# Patient Record
Sex: FEMALE | Race: WHITE | Employment: UNEMPLOYED | URBAN - METROPOLITAN AREA
[De-identification: names, ages, dates, MRNs, and addresses within clinical notes are randomized per-mention and may not be internally consistent; named-entity substitution may affect disease eponyms.]

---

## 2017-01-01 ENCOUNTER — PATIENT MESSAGE (OUTPATIENT)
Dept: INTERNAL MEDICINE CLINIC | Age: 47
End: 2017-01-01

## 2017-01-03 RX ORDER — BUTALBITAL, ACETAMINOPHEN AND CAFFEINE 50; 325; 40 MG/1; MG/1; MG/1
1 TABLET ORAL
Qty: 40 TAB | Refills: 3 | OUTPATIENT
Start: 2017-01-03 | End: 2018-01-16 | Stop reason: SDUPTHER

## 2017-01-10 ENCOUNTER — OFFICE VISIT (OUTPATIENT)
Dept: SURGERY | Age: 47
End: 2017-01-10

## 2017-01-10 VITALS
TEMPERATURE: 97.7 F | HEIGHT: 63 IN | SYSTOLIC BLOOD PRESSURE: 120 MMHG | BODY MASS INDEX: 32.51 KG/M2 | DIASTOLIC BLOOD PRESSURE: 78 MMHG | RESPIRATION RATE: 18 BRPM | OXYGEN SATURATION: 98 % | WEIGHT: 183.5 LBS | HEART RATE: 70 BPM

## 2017-01-10 DIAGNOSIS — K43.9 HERNIA OF ABDOMINAL WALL: ICD-10-CM

## 2017-01-10 DIAGNOSIS — E66.01 MORBID OBESITY DUE TO EXCESS CALORIES (HCC): ICD-10-CM

## 2017-01-10 DIAGNOSIS — K91.2 POSTOPERATIVE INTESTINAL MALABSORPTION: Primary | ICD-10-CM

## 2017-01-10 DIAGNOSIS — E66.9 OBESITY (BMI 30.0-34.9): ICD-10-CM

## 2017-01-10 NOTE — PROGRESS NOTES
Chief Complaint   Patient presents with    Surgical Follow-up     6 months s/p gastric bypass by Dr Yuriy Acosta. down 68.5 pounds,  lost 26.5 pounds. Patient is 6 months status post Malabsorptive gastric bypass for treatment of morbid obesity. Patient has lost 68.5 lbs. Since surgery. Patient is satisfied with progress. Patient is consuming about 70 grams of protein daily. She is drinking a premiere shake and eating a premiere protein bar daily. She does not do well with meats unless it is in a sauce. She does fine with vegetables. She is drinking 60+ oz fluids. she is using her bike for exercise. She has occasional nausea and takes the Zofran 1-2 x/ month. Bowels moving daily to every other day and she is using Miralax prn. Keep food journal +   Fitness tracker +   Counseling +   Craves sweets     Co-Morbid(s)     Resolved      Was anti coagulation initiated for presumed / confirmed DVT/PE? NO    Was an incisional hernia noted on exam?       YES (this was present prior to surgery)       COMORBIDITY     SLEEP APNEA                 YES        GERD  (req.meds)           NO  HYPERLIPIDEMIA           NO  HYPERTENSION             NO       IF YES, # OF HTN MEDICATIONS 0  DIABETES                        NO      IF YES, 0 NON-INSULIN   0 INSULIN     Visit Vitals    /78    Pulse 70    Temp 97.7 °F (36.5 °C)    Resp 18    Ht 5' 3\" (1.6 m)    Wt 183 lb 8 oz (83.2 kg)    LMP 01/29/2011    SpO2 98%    BMI 32.51 kg/m2     A + O x 3  Chest  CTA  COR  RRR  ABD Soft, palpable firmness to the right of the umbilicus with slight bulge in the area; non tender and not reducible; ND, +BS  EXT No edema; ambulating independently        ICD-10-CM ICD-9-CM    1. 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 Obesity (BMI 30.0-34. 9) E66.9 278.00    3.  Hernia of abdominal wall K43.9 553.20      6 months s/p Malabsorptive gastric bypass for treatment of morbid obesity   Doing well overall   Referred to RD for support   Labs reviewed   Wants hernia fixed, but \"needs to lose more weight\" so will reassess in 3 months   S/s hernia incarceration reviewed   Daily activity   Vitamins   Support group  Continue mental health support   Follow up in 3 months   Raad Lyn verbalized understanding and questions were answered to the best of my knowledge and ability. Behavior change  educational materials were provided. 15 minutes spent in face to face with Raad Lyn > 50% counseling.

## 2017-01-10 NOTE — MR AVS SNAPSHOT
Visit Information Date & Time Provider Department Dept. Phone Encounter #  
 1/10/2017  9:00 AM Soledad Singh NP [place] [phone] Your Appointments 5/2/2017  7:45 AM  
ACUTE CARE with Maninder Galvin MD  
[facility] [address] [place] [address]) Appt Note: 6 mnth fu thyroid [address] [place] [phone]  
[phone]  
  
   
 [address] [facility] [id number] Upcoming Health Maintenance Date Due  
 FOOT EXAM Q1 5/31/1979 INFLUENZA AGE 9 TO ADULT 8/1/2016 MICROALBUMIN Q1 10/13/2016 EYE EXAM RETINAL OR DILATED Q1 2/8/2017 HEMOGLOBIN A1C Q6M 5/2/2017 LIPID PANEL Q1 6/27/2017 DTaP/Tdap/Td series (2 - Td) 11/12/2023 Allergies as of 1/10/2017  Review Complete On: 10/4/2016 By: Soledad Singh NP Severity Noted Reaction Type Reactions Sulfa (Sulfonamide Antibiotics) High 03/04/2011   Side Effect Anaphylaxis, Other (comments) Difficulty breathing/high fever/delusional    
 Aleve [Naproxen Sodium]  09/30/2013    Nausea Only Codeine  03/04/2011   Side Effect Nausea Only Ibuprofen  03/04/2011   Side Effect Hives Lactose  02/23/2016   Intolerance Other (comments) Gas Penicillin G  03/04/2011   Side Effect Hives Current Immunizations  Reviewed on 7/11/2016 Name Date Influenza Vaccine 11/12/2013 Influenza Vaccine (Quad) PF 10/13/2015 Influenza Vaccine (Whole Virus) 3/1/2013 Influenza Vaccine PF 10/23/2014 Influenza Vaccine Split 11/2/2011 Pneumococcal Polysaccharide (PPSV-23) 3/11/2015 Tdap 11/12/2013 Not reviewed this visit Vitals BP Pulse Temp Resp Height(growth percentile) Weight(growth percentile) 120/78 70 97.7 °F (36.5 °C) 18 5' 3\" (1.6 m) 183 lb 8 oz (83.2 kg) LMP SpO2 BMI OB Status Smoking Status 01/29/2011 98% 32.51 kg/m2 Hysterectomy Former Smoker Vitals History BMI and BSA Data Body Mass Index Body Surface Area 32.51 kg/m 2 1.92 m 2 Preferred Pharmacy Pharmacy Name Phone [id number]- [facility] [phone] Your Updated Medication List  
  
   
This list is accurate as of: 1/10/17  9:33 AM.  Always use your most recent med list.  
  
  
  
  
 ascorbic acid (vitamin C) 250 mg tablet Commonly known as:  VITAMIN C Take 250 mg by mouth two (2) times a day. AYR SALINE topical gel Generic drug:  sodium chloride-aloe vera  
by Nasal route daily as needed (dry nares). Benefiber Clear 3 gram/3.5 gram Powd Generic drug:  wheat dextrin Take  by mouth daily. 2 Teaspoons daily AT 7AM AND 5PM  
  
 buPROPion  mg SR tablet Commonly known as:  WELLBUTRIN SR  
TAKE 1 TABLET BY MOUTH EVERY DAY  
  
 busPIRone 5 mg tablet Commonly known as:  BUSPAR Take 5 mg by mouth three (3) times daily as needed. butalbital-acetaminophen-caffeine -40 mg per tablet Commonly known as:  Kiki Reasoner Take 1 Tab by mouth every six (6) hours as needed for Pain. Max Daily Amount: 4 Tabs. calcium citrate-vitamin d3 315-200 mg-unit Tab Commonly known as:  CITRACAL+D Take 2 Tabs by mouth two (2) times daily (with meals). Indications: Pt states, \"medication has been change to take at noon and dinner. \"  
  
 cetirizine 10 mg tablet Commonly known as:  ZYRTEC Take  by mouth nightly. CULTURELLE PO Take  by mouth. cyclobenzaprine 10 mg tablet Commonly known as:  FLEXERIL  
TAKE 1 TABLET BY MOUTH 3 TIMES A DAY AS NEEDED MUSCLE SPASM  
  
 escitalopram oxalate 20 mg tablet Commonly known as:  Seda Martinez Take 1 Tab by mouth daily. FLONASE 50 mcg/actuation nasal spray Generic drug:  fluticasone 2 Sprays by Both Nostrils route nightly as needed for Rhinitis (as needed). * gabapentin 300 mg capsule Commonly known as:  NEURONTIN  
 TAKE ONE CAPSULE BY MOUTH IN THE MORNING, 1 CAPSULE AT NOON, AND 2 CAPSULES AT BEDTIME  
  
 * gabapentin 300 mg capsule Commonly known as:  NEURONTIN  
TAKE 1 CAPSULES BY MOUTH IN THE MORNING,, 1 AT NOON, 2 CAPS AT BEDTIME  
  
 hyoscyamine SL 0.125 mg SL tablet Commonly known as:  LEVSIN/SL  
1 Tab by SubLINGual route every four (4) hours as needed for Cramping.  
  
 ketoconazole 2 % topical cream  
Commonly known as:  NIZORAL Apply  to affected area daily. Lancets Misc Commonly known as: One Touch Georgann Mcardle 1 Package by Does Not Apply route daily. lansoprazole 30 mg capsule Commonly known as:  PREVACID Take 1 Cap by mouth daily. magnesium 250 mg Tab Take 250 mg by mouth.  
  
 magnesium hydroxide 400 mg/5 mL suspension Commonly known as:  MILK OF MAGNESIA Take 30 mL by mouth daily as needed for Constipation. ondansetron 4 mg disintegrating tablet Commonly known as:  ZOFRAN ODT Take 1 Tab by mouth every eight (8) hours as needed for Nausea. ONETOUCH ULTRA TEST strip Generic drug:  glucose blood VI test strips TEST ONCE A DAY. 100 STRIPS  
  
 OTHER  
C-PAP Machine SYNTHROID 175 mcg tablet Generic drug:  levothyroxine Take 1 Tab by mouth Daily (before breakfast). SYSTANE (PROPYLENE GLYCOL) 0.4-0.3 % Drop Generic drug:  peg 400-propylene glycol Administer 1 Drop to both eyes as needed. therapeutic multivitamin tablet Commonly known as:  [facility] Take 1 Tab by mouth two (2) times a day. VITAMIN D3 5,000 unit Tab tablet Generic drug:  cholecalciferol (VITAMIN D3) Take 1,000 Units by mouth two (2) times a day. LIQUID SOFT GEL * Notice: This list has 2 medication(s) that are the same as other medications prescribed for you. Read the directions carefully, and ask your doctor or other care provider to review them with you. Patient Instructions Call and make an appointment with the dietician, please call or email Shawn Shields, [address] at: 
 
[phone] [email or web address] 
 
Try Quinoa instead of mashed potatoes (great protein and fiber) Stay on your vitamins Decrease the Prevacid to every other day for the next week, then stop If you have increased stomach upset, nausea or food intolerance let me know and we can try resuming at a lower dose Introducing [facility] [place]! Dear Maikel Listen: 
Thank you for requesting a [employer] account. Our records indicate that you already have an active DNs[employer] account. You can access your account anytime at [email or web address] Did you know that you can access your hospital and ER discharge instructions at any time in [employer]Ns[employer]? You can also review all of your test results from your hospital stay or ER visit. Additional Information If you have questions, please visit the Frequently Asked Questions section of the [employer]Ns[employer] website at [email or web address] Remember, DNsolution is NOT to be used for urgent needs. For medical emergencies, dial 911. Now available from your iPhone and Android! Please provide this summary of care documentation to your next provider. Your primary care clinician is listed as Carly Mata. If you have any questions after today's visit, please call [phone].

## 2017-01-10 NOTE — PATIENT INSTRUCTIONS
Call and make an appointment with the dietician, please call or email Filippo Alatorre RD at:    [phone]  [email or web address]    Try [place]ne [place] instead of mashed potatoes (great protein and fiber)    Stay on your vitamins     Decrease the Prevacid to every other day for the next week, then stop     If you have increased stomach upset, nausea or food intolerance let me know and we can try resuming at a lower dose

## 2017-01-10 NOTE — PROGRESS NOTES
1. Have you been to the ER, urgent care clinic since your last visit? Hospitalized since your last visit?  no    2. Have you seen or consulted any other health care providers outside of the [facility] [place] since your last visit? Include any pap smears or colon screening.    no

## 2017-01-18 ENCOUNTER — CLINICAL SUPPORT (OUTPATIENT)
Dept: SURGERY | Age: 47
End: 2017-01-18

## 2017-01-18 VITALS — WEIGHT: 181 LBS | HEIGHT: 63 IN | BODY MASS INDEX: 32.07 KG/M2

## 2017-01-18 DIAGNOSIS — Z71.3 NUTRITIONAL COUNSELING: Primary | ICD-10-CM

## 2017-01-18 NOTE — PROGRESS NOTES
Post-Operative Bariatric Nutrition Counseling     Physician/Surgeon/Provider: Roge Keane M.D. / Lydia Persaud N.P.   Name: Brittani Vasquez  : 1970        Reason for visit: Follow up nutrition education and counseling post gastric bypass      ASSESSMENT:  Date of surgery:6 months post-op   Medications/Supplements:   Prior to Admission medications    Medication Sig Start Date End Date Taking? Authorizing Provider   butalbital-acetaminophen-caffeine (FIORICET, ESGIC) -40 mg per tablet Take 1 Tab by mouth every six (6) hours as needed for Pain. Max Daily Amount: 4 Tabs. 1/3/17   Celsa Royal MD   cyclobenzaprine (FLEXERIL) 10 mg tablet TAKE 1 TABLET BY MOUTH 3 TIMES A DAY AS NEEDED MUSCLE SPASM 12/15/16   Celsa Royal MD   SYNTHROID 175 mcg tablet Take 1 Tab by mouth Daily (before breakfast). 11/3/16   Celsa Royal MD   escitalopram oxalate (LEXAPRO) 20 mg tablet Take 1 Tab by mouth daily. 16   Celsa Royal MD   ondansetron (ZOFRAN ODT) 4 mg disintegrating tablet Take 1 Tab by mouth every eight (8) hours as needed for Nausea. 16   Marizol Andres NP   buPROPion SR (WELLBUTRIN SR) 100 mg SR tablet TAKE 1 TABLET BY MOUTH EVERY DAY 10/10/16   Celsa Royal MD   lansoprazole (PREVACID) 30 mg capsule Take 1 Cap by mouth daily. 16   Celsa Royal MD   gabapentin (NEURONTIN) 300 mg capsule TAKE ONE CAPSULE BY MOUTH IN THE MORNING, 1 CAPSULE AT NOON, AND 2 CAPSULES AT BEDTIME 16   Celsa Royal MD   gabapentin (NEURONTIN) 300 mg capsule TAKE 1 CAPSULES BY MOUTH IN THE MORNING,, 1 AT NOON, 2 CAPS AT BEDTIME 16   Celsa Royal MD   hyoscyamine SL (LEVSIN/SL) 0.125 mg SL tablet 1 Tab by SubLINGual route every four (4) hours as needed for Cramping. 16   Blayne Rivera NP   magnesium hydroxide (MILK OF MAGNESIA) 400 mg/5 mL suspension Take 30 mL by mouth daily as needed for Constipation.     Historical Provider   busPIRone (BUSPAR) 5 mg tablet Take 5 mg by mouth three (3) times daily as needed. 4/28/16   Historical Provider   ascorbic acid, vitamin C, (VITAMIN C) 250 mg tablet Take 250 mg by mouth two (2) times a day. Historical Provider   LACTOBACILLUS RHAMNOSUS GG (CULTURELLE PO) Take  by mouth. Historical Provider   therapeutic multivitamin (THERAGRAN) tablet Take 1 Tab by mouth two (2) times a day. Historical Provider   magnesium 250 mg tab Take 250 mg by mouth. Historical Provider   calcium citrate-vitamin d3 (CITRACAL+D) 315-200 mg-unit tab Take 2 Tabs by mouth two (2) times daily (with meals). Indications: Pt states, \"medication has been change to take at noon and dinner. \"    Historical Provider   cetirizine (ZYRTEC) 10 mg tablet Take  by mouth nightly. Historical Provider   wheat dextrin (BENEFIBER CLEAR) 3 gram/3.5 gram powd Take  by mouth daily. 2 Teaspoons daily AT 7AM AND 5PM    Historical Provider   cholecalciferol, VITAMIN D3, (VITAMIN D3) 5,000 unit tab tablet Take 1,000 Units by mouth two (2) times a day. LIQUID SOFT GEL 10/27/15   Hudson Lopes MD   ketoconazole (NIZORAL) 2 % topical cream Apply  to affected area daily. Patient taking differently: Apply  to affected area as needed. 10/13/15   Hudson Lopes MD   fluticasone (FLONASE) 50 mcg/actuation nasal spray 2 Sprays by Both Nostrils route nightly as needed for Rhinitis (as needed). Historical Provider   peg 400-propylene glycol (SYSTANE) 0.4-0.3 % drop Administer 1 Drop to both eyes as needed. Historical Provider   sodium chloride-aloe vera (AYR SALINE) topical gel by Nasal route daily as needed (dry nares).     Historical Provider   OTHER C-PAP Machine    Historical Provider       Pt takes recommended supplements as prescribed:   Multivitamin: yes    Calcium Citrate:yes     Vitamin D3: yes    Vitamin B12: yes      Food Allergies/Intolerances: intolerant to dry meat, no food allergies     Anthropometrics:     Ht:63\"      Wt :181#   Pre-op wt: 268#   Net Wt Loss: 87# (32%)   IBW: 115#  %IBW: 157%   BMI: 32 Category: obesity II     Reported wt history: Pt presents today seeking nutrition counseling to promote continued wt loss post gastric bypass. Pt had surgery July 11, 2016 and reports a pre-operative wt of 268#. Today's reported wt indicates a 87# wt loss. Pt is satisfied with progress and many health conditions have since resolved as a result of wt loss (diabetes, HTN, high cholesterol, etc). Pt has been told that she needs to lose another 31# to qualify for hernia repair surgery with Dr. Nakul Sheehan. She is concerned because her wt loss has recently \"slowed\". States her lowest adult BW prior to surgery was 180# and she is fearful that she will never be able to get below this wt. Her personal wt loss goal is to get her BMI down to \"normal\" and wt of 135#. Her barriers to wt loss are mostly related to lack of exercise. She has a busy schedule which causes her to rely fairly heavily on protein shakes and bars as her meals for 1-2 meals per day. Limited food sources of protein d/t lack of time and intolerance to a lot of meats. Uses crock-pot and sauces to help improve tolerance of meats. Overall appears pt could benefit from more routine, intentional and more challenging exercise as well as more whole food sources of protein for more mental and physical satiety and to help promote weight loss. Exercise/Physical Actvity: riding stationary bike for 5 minutes, 3-4 times per day     Reported Diet History: A post op nutrition checklist and 24 hour diet recall were obtained from patient;       Consumes 3 meals per day Yes    Includes a lean source of protein with each meal Yes    Measures all meals to 1/2-1 cup Yes    Limits dining out and orders with special request Yes    Consumes meals over 20-30 minutes Yes    Monitors hunger and fullness cues Yes    Follows the 30-30-30 rule Yes    Sips 48-64 oz of sugar free, calorie free, non-carbonated beverages per day Yes    Utilizes food journal for self-monitoring Yes    Attends Bariatric Support Group Yes        24 Hour Diet Recall  Breakfast  Greek yogurt or protein bar/shake    Lunch  protein bar/shake or 1/8 cup chicken salad and 5 baby carrots   Dinner  1/2 cup [employer]  [place] [facility] [place]    Snacks  1/2 small apple and peanut butter or protein bar   Beverages  Herbal tea, water        NUTRITION DIAGNOSIS:  1. Physical inactivity r/t perception that busy schedule prevents exercise evidenced by limited frequency, duration and/or intensity of exercise. 2. Food and nutrition related knowledge deficit r/t lack of exposure to information evidenced by pt seeking nutrition counseling to promote continued wt loss. Tracking intake is averaging about 800 calories per day. Protein intake is consistent at 60 grams per day but comes mostly from shakes and protein bars. NUTRITION INTERVENTION:  Nutrition counseling, motivational interviewing and nutrition education to help promote continued wt loss. Encouraged pt to develop a more routine and intentional exercise regimen that is challenging to help promote weight loss. Pt has gotten this far with her wt loss primarily from calorie restriction and she now needs to incorporate more challenging exercise to help her get past her current wt. Provided information for various exercise programs and encouraged her to utilize her current gym membership. Nutrition education for increasing food sources of protein using moist methods of cooking. Provided various recipes to help with better tolerance of meats/proteins. Discussed muffin tin recipes for things she make ahead for the week for easy quick and go options d/t busy schedule. Discussed the importance of trying to get protein from food and less reliance on shakes and bars. Use shakes and bars PRN. NUTRITION MONITORING AND EVALUATION:    The following goals were established with patient:  1. Increase exercise.  It needs to be challenging and routine/consistent/intentional. Resources provided. 2. Increase food sources of protein. Recipes and cooking methods discussed and provided additional ideas. 3. Use protein shakes/bars PRN. Maintain minimum of 60 grams protein daily. 4. Follow up with RD PRN. Specific tips and techniques to facilitate compliance with above recommendations were provided and discussed. Pt was strongly encouraged to begin making necessary changes now, attend support group, and re-visit the dietitian prn. If further details are desired please feel free to contact me at [phone]. This phone number was also provided to the patient for any further questions or concerns.              Ministerio Worrell RD

## 2017-03-04 DIAGNOSIS — Z98.84 HISTORY OF GASTRIC BYPASS: ICD-10-CM

## 2017-03-05 RX ORDER — LANSOPRAZOLE 30 MG/1
CAPSULE, DELAYED RELEASE ORAL
Qty: 90 CAP | Refills: 1 | Status: SHIPPED | OUTPATIENT
Start: 2017-03-05 | End: 2017-05-04 | Stop reason: ALTCHOICE

## 2017-04-11 ENCOUNTER — OFFICE VISIT (OUTPATIENT)
Dept: SURGERY | Age: 47
End: 2017-04-11

## 2017-04-11 VITALS
HEIGHT: 63 IN | OXYGEN SATURATION: 98 % | RESPIRATION RATE: 18 BRPM | HEART RATE: 73 BPM | DIASTOLIC BLOOD PRESSURE: 80 MMHG | TEMPERATURE: 97.5 F | SYSTOLIC BLOOD PRESSURE: 130 MMHG | WEIGHT: 168.5 LBS | BODY MASS INDEX: 29.86 KG/M2

## 2017-04-11 DIAGNOSIS — R11.0 NAUSEA: ICD-10-CM

## 2017-04-11 DIAGNOSIS — K43.2 VENTRAL INCISIONAL HERNIA WITHOUT OBSTRUCTION OR GANGRENE: ICD-10-CM

## 2017-04-11 DIAGNOSIS — Z98.84 HX OF GASTRIC BYPASS: ICD-10-CM

## 2017-04-11 DIAGNOSIS — R10.11 COLICKY RUQ ABDOMINAL PAIN: Primary | ICD-10-CM

## 2017-04-11 RX ORDER — POLYETHYLENE GLYCOL 3350 17 G/17G
17 POWDER, FOR SOLUTION ORAL DAILY
COMMUNITY

## 2017-04-11 NOTE — PROGRESS NOTES
Office Visit    Subjective:     Cailin Huff is a 51 y.o. female 9 months post laparoscopic gastric bypass surgery on 7/11/16, she has lost 100 lbs since then. She complains of right upper quadrant pain, that is typically associated with fatty foods. Pain onset's with food's such as a protein bar, and apple with peanut butter. She has had nausea but no vomiting after eating tilapia, seasoned with olive oil, last night that was so bad she could not finish her dinner. The patient has has had lighter colored stool but not had jaundice, acholic stools or dark urine and has not had a history of pancreatitis or hepatitis. On an unrelated note, patient also comes in with complaints of epigastric hernia, no very symptomatic at this time.  She would like to hold off on that until [address] or August.     Patient Active Problem List    Diagnosis Date Noted    History of weight loss surgery 07/28/2016    Hypothyroidism due to acquired atrophy of thyroid 06/02/2016    Fibromyalgia 11/11/2013    Anxiety 03/04/2011    Depression 03/04/2011     Past Medical History:   Diagnosis Date    Arthritis     BMI 45.0-49.9, adult ([address]     Chronic pain     fibromyalgia    Depression     and anxiety    Diabetes ([address]     Elevated lipids     Fibromyalgia     GERD (gastroesophageal reflux disease) 2013    H/O seasonal allergies     Headache     Hypercholesterolemia     Hypertension     Migraines     Morbid obesity ([address]     LINO on CPAP     Thyroid disease       Past Surgical History:   Procedure Laterality Date    HX GYN      ECTOPIC PREGNANCE LEFT TUBE REMOVED    HX HYSTERECTOMY  2011    HX LAP GASTRIC BYPASS  7/11/16    by Dr Lia Larsen    right ovarian tumor    HX TUBAL LIGATION        Social History   Substance Use Topics    Smoking status: Former Smoker     Packs/day: 1.00     Years: 20.00     Types: Cigarettes     Quit date: 3/4/2005    Smokeless tobacco: Never Used    Alcohol use 0.0 oz/week     0 Standard drinks or equivalent per week      Comment: holidays - wine/rare      Family History   Problem Relation Age of Onset    Diabetes Mother     Hypertension Mother     Crohn's Disease Mother     Asthma Mother     Lung Disease Mother     Migraines Mother     Cancer Mother      SKIN    Depression Mother     Other Mother      FIBROMYALGIA    Gout Mother     Cancer Father      oral    Diabetes Father      steroid induced    Alcohol abuse Sister     Heart Disease Sister     Migraines Sister     Depression Sister      BIPOLAR DISORDER    Anesth Problems Daughter      N&V    Asthma Daughter     Cancer Paternal Grandmother      colon      Current Outpatient Prescriptions   Medication Sig    polyethylene glycol (MIRALAX) 17 gram packet Take 17 g by mouth daily.  buPROPion SR (WELLBUTRIN SR) 100 mg SR tablet TAKE 1 TABLET BY MOUTH EVERY DAY    lansoprazole (PREVACID) 30 mg capsule TAKE 1 CAP BY MOUTH DAILY.  butalbital-acetaminophen-caffeine (FIORICET, ESGIC) -40 mg per tablet Take 1 Tab by mouth every six (6) hours as needed for Pain. Max Daily Amount: 4 Tabs.  cyclobenzaprine (FLEXERIL) 10 mg tablet TAKE 1 TABLET BY MOUTH 3 TIMES A DAY AS NEEDED MUSCLE SPASM    SYNTHROID 175 mcg tablet Take 1 Tab by mouth Daily (before breakfast).  escitalopram oxalate (LEXAPRO) 20 mg tablet Take 1 Tab by mouth daily.  ondansetron (ZOFRAN ODT) 4 mg disintegrating tablet Take 1 Tab by mouth every eight (8) hours as needed for Nausea.  gabapentin (NEURONTIN) 300 mg capsule TAKE 1 CAPSULES BY MOUTH IN THE MORNING,, 1 AT NOON, 2 CAPS AT BEDTIME    hyoscyamine SL (LEVSIN/SL) 0.125 mg SL tablet 1 Tab by SubLINGual route every four (4) hours as needed for Cramping.  busPIRone (BUSPAR) 5 mg tablet Take 5 mg by mouth three (3) times daily as needed.  ascorbic acid, vitamin C, (VITAMIN C) 250 mg tablet Take 250 mg by mouth two (2) times a day.     LACTOBACILLUS RHAMNOSUS GG (CULTURELLE PO) Take  by mouth.  therapeutic multivitamin (THERAGRAN) tablet Take 1 Tab by mouth two (2) times a day.  magnesium 250 mg tab Take 250 mg by mouth.  calcium citrate-vitamin d3 (CITRACAL+D) 315-200 mg-unit tab Take 2 Tabs by mouth two (2) times daily (with meals). Indications: Pt states, \"medication has been change to take at noon and dinner. \"    cetirizine (ZYRTEC) 10 mg tablet Take  by mouth nightly.  wheat dextrin (BENEFIBER CLEAR) 3 gram/3.5 gram powd Take  by mouth daily. 2 Teaspoons daily AT 7AM AND 5PM    cholecalciferol, VITAMIN D3, (VITAMIN D3) 5,000 unit tab tablet Take 1,000 Units by mouth two (2) times a day. LIQUID SOFT GEL    fluticasone (FLONASE) 50 mcg/actuation nasal spray 2 Sprays by Both Nostrils route nightly as needed for Rhinitis (as needed).  peg 400-propylene glycol (SYSTANE) 0.4-0.3 % drop Administer 1 Drop to both eyes as needed.  sodium chloride-aloe vera (AYR SALINE) topical gel by Nasal route daily as needed (dry nares).  OTHER C-PAP Machine    ketoconazole (NIZORAL) 2 % topical cream Apply  to affected area daily. (Patient taking differently: Apply  to affected area as needed.)     No current facility-administered medications for this visit.        Allergies   Allergen Reactions    Sulfa (Sulfonamide Antibiotics) Anaphylaxis and Other (comments)     Difficulty breathing/high fever/delusional      Aleve [Naproxen Sodium] Nausea Only    Codeine Nausea Only    Ibuprofen Hives    Lactose Other (comments)     Gas     Penicillin G Hives        Review of Systems:  A comprehensive review of 12 systems was not done       Objective:     Visit Vitals    /80    Pulse 73    Temp 97.5 °F (36.4 °C) (Oral)    Resp 18    Ht 5' 3\" (1.6 m)    Wt 168 lb 8 oz (76.4 kg)    LMP 01/29/2011    SpO2 98%    BMI 29.85 kg/m2        Physical Exam:    General:  alert, cooperative, no distress, appears stated age   Eyes:  conjunctivae and sclerae normal, EOMs intact, PERRL   Throat & Neck: normal, no erythema or exudates noted. , no erythema or exudates noted and neck supple and symmetrical; no palpable masses   Lungs:   clear to auscultation bilaterally   Heart:  Regular rate and rhythm   Abdomen:   Rounded, soft, Tenderness to palpation RUQ, no masses, no rebound or guarding    Two incisional hernias at the midline below the umbilicus:  scar with two incisional hernias, one at each end of the scar both reducible and non-tender. Skin: Normal.   Neuro: Mental status: Alert, oriented, thought content appropriate  Gait: Normal   Lymphatic: no adenopathy - inguinal, cerv, supraclav       Assessment:     Encounter Diagnoses     ICD-10-CM ICD-9-CM   1. Colicky RUQ abdominal pain R10.11 789.01   2. Ventral incisional hernia without obstruction or gangrene K43.2 553.21   3. Hx of gastric bypass Z98.890 V45.86      Patient has significant symptoms and wishes to proceed with surgical intervention. Her symptoms are suspicious for cholelithiasis with biliary colic. Recommendation:     1. US ABD to confirm cholelithiasis with biliary colic. I will call her with those results and if appropriate schedule for surgery at the patient's convenience. 2. If the US results are positive we will proceed with laparoscopic cholecystectomy with cholangiography. I explained the indications for laparoscopic cholecystectomy as well as the alternatives. I discussed the potential risks, including but not limited to bleeding, wound infection, trocar injuries and also the possible need for conversion to open procedure. She indicates that she understands the risks, accepts and wishes to proceed. 3. A patient education booklet on gallbladder was given to the patient. Signed By: Jae Diop MD    April 11, 2017       Total time spent with patient, greater than 50% of the time was spent in counseling: 10 minutes.  1:17 PM - 1:27 PM.    Chart was written by Kayode Cruz, as dictated by Dr. Octavia Nieves MD.

## 2017-04-11 NOTE — PROGRESS NOTES
1. Have you been to the ER, urgent care clinic since your last visit? Hospitalized since your last visit? no    2. Have you seen or consulted any other health care providers outside of the [address] [place] [address] since your last visit? Include any pap smears or colon screening.  no

## 2017-04-11 NOTE — MR AVS SNAPSHOT
Visit Information Date & Time Provider Department Dept. Phone Encounter #  
 4/11/2017  1:00 PM Nancy Fajardo MD [address] [id number] [id number] Your Appointments 4/20/2017 10:40 AM  
Any with Stephanie Mayo NP  
[facility] [address] ([facility]) Appt Note: follow up RY 7/16; follow up; EP  
 [address] [facility] [address]  
[address] [phone]  
  
    
 5/2/2017  7:45 AM  
ACUTE CARE with Brooke Wiseman MD  
[facility]) Appt Note: 6 mnth fu thyroid [address] [facility] [address] [address]  
[phone]  
  
   
 [address] [facility] [address] Upcoming Health Maintenance Date Due  
 FOOT EXAM Q1 5/31/1979 INFLUENZA AGE 9 TO ADULT 8/1/2016 MICROALBUMIN Q1 10/13/2016 EYE EXAM RETINAL OR DILATED Q1 2/8/2017 HEMOGLOBIN A1C Q6M 5/2/2017 LIPID PANEL Q1 6/27/2017 DTaP/Tdap/Td series (2 - Td) 11/12/2023 Allergies as of 4/11/2017  Review Complete On: 4/11/2017 By: Nancy Fajardo MD  
  
 Severity Noted Reaction Type Reactions Sulfa (Sulfonamide Antibiotics) High 03/04/2011   Side Effect Anaphylaxis, Other (comments) Difficulty breathing/high fever/delusional    
 Aleve [Naproxen Sodium]  09/30/2013    Nausea Only Codeine  03/04/2011   Side Effect Nausea Only Ibuprofen  03/04/2011   Side Effect Hives Lactose  02/23/2016   Intolerance Other (comments) Gas Penicillin G  03/04/2011   Side Effect Hives Current Immunizations  Reviewed on 7/11/2016 Name Date Influenza Vaccine 11/12/2013 Influenza Vaccine (Quad) PF 10/13/2015 Influenza Vaccine (Whole Virus) 3/1/2013 Influenza Vaccine PF 10/23/2014 Influenza Vaccine Split 11/2/2011 Pneumococcal Polysaccharide (PPSV-23) 3/11/2015 Tdap 11/12/2013 Not reviewed this visit You Were Diagnosed With   
  
 Codes Comments Colicky RUQ abdominal pain    -  Primary ICD-10-CM: R10.11 ICD-9-CM: 789.01 Ventral incisional hernia without obstruction or gangrene     ICD-10-CM: K43.2 ICD-9-CM: 553.21 Hx of gastric bypass     ICD-10-CM: Z98.890 ICD-9-CM: V45.86 Vitals BP Pulse Temp Resp Height(growth percentile) Weight(growth percentile) 130/80 73 97.5 °F (36.4 °C) (Oral) 18 5' 3\" (1.6 m) 168 lb 8 oz (76.4 kg) LMP SpO2 BMI OB Status Smoking Status 01/29/2011 98% 29.85 kg/m2 Hysterectomy Former Smoker Vitals History BMI and BSA Data Body Mass Index Body Surface Area  
 29.85 kg/m 2 1.84 m 2 Preferred Pharmacy Pharmacy Name Phone [id number] [address] [facility] [phone] Your Updated Medication List  
  
   
This list is accurate as of: 4/11/17  2:11 PM.  Always use your most recent med list.  
  
  
  
  
 ascorbic acid (vitamin C) 250 mg tablet Commonly known as:  VITAMIN C Take 250 mg by mouth two (2) times a day. AYR SALINE topical gel Generic drug:  sodium chloride-aloe vera  
by Nasal route daily as needed (dry nares). Benefiber Clear 3 gram/3.5 gram Powd Generic drug:  wheat dextrin Take  by mouth daily. 2 Teaspoons daily AT 7AM AND 5PM  
  
 buPROPion  mg SR tablet Commonly known as:  WELLBUTRIN SR  
TAKE 1 TABLET BY MOUTH EVERY DAY  
  
 busPIRone 5 mg tablet Commonly known as:  BUSPAR Take 5 mg by mouth three (3) times daily as needed. butalbital-acetaminophen-caffeine -40 mg per tablet Commonly known as:  [place] Take 1 Tab by mouth every six (6) hours as needed for Pain. Max Daily Amount: 4 Tabs. calcium citrate-vitamin d3 315-200 mg-unit Tab Commonly known as:  CITRACAL+D Take 2 Tabs by mouth two (2) times daily (with meals).  Indications: Pt states, \"medication has been change to take at noon and dinner. \"  
  
 cetirizine 10 mg tablet Commonly known as:  ZYRTEC Take  by mouth nightly. CULTURELLE PO Take  by mouth. cyclobenzaprine 10 mg tablet Commonly known as:  FLEXERIL  
TAKE 1 TABLET BY MOUTH 3 TIMES A DAY AS NEEDED MUSCLE SPASM  
  
 escitalopram oxalate 20 mg tablet Commonly known as:  Tod Martinez Take 1 Tab by mouth daily. FLONASE 50 mcg/actuation nasal spray Generic drug:  fluticasone 2 Sprays by Both Nostrils route nightly as needed for Rhinitis (as needed). gabapentin 300 mg capsule Commonly known as:  NEURONTIN  
TAKE 1 CAPSULES BY MOUTH IN THE MORNING,, 1 AT NOON, 2 CAPS AT BEDTIME  
  
 hyoscyamine SL 0.125 mg SL tablet Commonly known as:  LEVSIN/SL  
1 Tab by SubLINGual route every four (4) hours as needed for Cramping.  
  
 ketoconazole 2 % topical cream  
Commonly known as:  NIZORAL Apply  to affected area daily. lansoprazole 30 mg capsule Commonly known as:  PREVACID TAKE 1 CAP BY MOUTH DAILY. magnesium 250 mg Tab Take 250 mg by mouth. MIRALAX 17 gram packet Generic drug:  polyethylene glycol Take 17 g by mouth daily. ondansetron 4 mg disintegrating tablet Commonly known as:  ZOFRAN ODT Take 1 Tab by mouth every eight (8) hours as needed for Nausea. OTHER  
C-PAP Machine SYNTHROID 175 mcg tablet Generic drug:  levothyroxine Take 1 Tab by mouth Daily (before breakfast). SYSTANE (PROPYLENE GLYCOL) 0.4-0.3 % Drop Generic drug:  peg 400-propylene glycol Administer 1 Drop to both eyes as needed. therapeutic multivitamin tablet Commonly known as:  [facility] Take 1 Tab by mouth two (2) times a day. VITAMIN D3 5,000 unit Tab tablet Generic drug:  cholecalciferol (VITAMIN D3) Take 1,000 Units by mouth two (2) times a day. LIQUID SOFT GEL To-Do List   
 04/11/2017 Imaging:  [facility] Introducing [facility]! Dear Frank Negrtee: 
Thank you for requesting a [employer] account. Our records indicate that you already have an active EasyProve account. You can access your account anytime at [email or web address] Did you know that you can access your hospital and ER discharge instructions at any time in EasyPro[employer]? You can also review all of your test results from your hospital stay or ER visit. Additional Information If you have questions, please visit the Frequently Asked Questions section of the EasyProve website at [email or web address] Remember, EasyProve is NOT to be used for urgent needs. For medical emergencies, dial 911. Now available from your iPhone and Android! Please provide this summary of care documentation to your next provider. Your primary care clinician is listed as Rita Ray. If you have any questions after today's visit, please call [phone].

## 2017-04-12 ENCOUNTER — HOSPITAL ENCOUNTER (OUTPATIENT)
Dept: PREADMISSION TESTING | Age: 47
Discharge: HOME | End: 2017-04-12
Attending: SURGERY
Payer: COMMERCIAL

## 2017-04-12 ENCOUNTER — HOSPITAL ENCOUNTER (OUTPATIENT)
Dept: ULTRASOUND IMAGING | Age: 47
Discharge: HOME | End: 2017-04-12
Attending: SURGERY
Payer: COMMERCIAL

## 2017-04-12 VITALS
OXYGEN SATURATION: 97 % | HEART RATE: 64 BPM | BODY MASS INDEX: 29.95 KG/M2 | SYSTOLIC BLOOD PRESSURE: 119 MMHG | HEIGHT: 63 IN | TEMPERATURE: 97.9 F | DIASTOLIC BLOOD PRESSURE: 78 MMHG | WEIGHT: 169 LBS

## 2017-04-12 DIAGNOSIS — Z98.84 HX OF GASTRIC BYPASS: ICD-10-CM

## 2017-04-12 DIAGNOSIS — R10.11 COLICKY RUQ ABDOMINAL PAIN: ICD-10-CM

## 2017-04-12 DIAGNOSIS — K43.2 VENTRAL INCISIONAL HERNIA WITHOUT OBSTRUCTION OR GANGRENE: ICD-10-CM

## 2017-04-12 LAB
ANION GAP BLD CALC-SCNC: 6 MMOL/L (ref 5–15)
BUN SERPL-MCNC: 17 MG/DL (ref 6–20)
BUN/CREAT SERPL: 29 (ref 12–20)
CALCIUM SERPL-MCNC: 8.7 MG/DL (ref 8.5–10.1)
CHLORIDE SERPL-SCNC: 102 MMOL/L (ref 97–108)
CO2 SERPL-SCNC: 32 MMOL/L (ref 21–32)
CREAT SERPL-MCNC: 0.59 MG/DL (ref 0.55–1.02)
GLUCOSE SERPL-MCNC: 78 MG/DL (ref 65–100)
POTASSIUM SERPL-SCNC: 4.5 MMOL/L (ref 3.5–5.1)
SODIUM SERPL-SCNC: 140 MMOL/L (ref 136–145)

## 2017-04-12 PROCEDURE — 76705 ECHO EXAM OF ABDOMEN: CPT

## 2017-04-12 PROCEDURE — 36415 COLL VENOUS BLD VENIPUNCTURE: CPT | Performed by: SURGERY

## 2017-04-12 PROCEDURE — 80048 BASIC METABOLIC PNL TOTAL CA: CPT | Performed by: SURGERY

## 2017-04-12 PROCEDURE — 93005 ELECTROCARDIOGRAM TRACING: CPT

## 2017-04-12 RX ORDER — ONDANSETRON 4 MG/1
TABLET, ORALLY DISINTEGRATING ORAL
Qty: 30 TAB | Refills: 0 | Status: SHIPPED | OUTPATIENT
Start: 2017-04-12 | End: 2019-02-28 | Stop reason: SDUPTHER

## 2017-04-12 RX ORDER — ACETAMINOPHEN 500 MG
1000 TABLET ORAL
COMMUNITY
End: 2017-08-04

## 2017-04-12 RX ORDER — LANOLIN ALCOHOL/MO/W.PET/CERES
5000 CREAM (GRAM) TOPICAL DAILY
COMMUNITY

## 2017-04-12 NOTE — PERIOP NOTES
PAT instructions reviewed with patient and given the opportunity to ask questions. Patient given surgical site information FAQs handout and reviewed. Patient given CHG wipes and instruction sheet, instructions for use reviewed with patient.

## 2017-04-13 LAB
ATRIAL RATE: 58 BPM
CALCULATED P AXIS, ECG09: 21 DEGREES
CALCULATED R AXIS, ECG10: 33 DEGREES
CALCULATED T AXIS, ECG11: 28 DEGREES
DIAGNOSIS, 93000: NORMAL
P-R INTERVAL, ECG05: 146 MS
Q-T INTERVAL, ECG07: 408 MS
QRS DURATION, ECG06: 74 MS
QTC CALCULATION (BEZET), ECG08: 400 MS
VENTRICULAR RATE, ECG03: 58 BPM

## 2017-04-19 ENCOUNTER — OFFICE VISIT (OUTPATIENT)
Dept: SURGERY | Age: 47
End: 2017-04-19

## 2017-04-19 ENCOUNTER — ANESTHESIA EVENT (OUTPATIENT)
Dept: SURGERY | Age: 47
End: 2017-04-19
Payer: COMMERCIAL

## 2017-04-19 VITALS
DIASTOLIC BLOOD PRESSURE: 80 MMHG | TEMPERATURE: 98.2 F | BODY MASS INDEX: 29.86 KG/M2 | HEART RATE: 80 BPM | WEIGHT: 168.5 LBS | OXYGEN SATURATION: 98 % | RESPIRATION RATE: 20 BRPM | HEIGHT: 63 IN | SYSTOLIC BLOOD PRESSURE: 140 MMHG

## 2017-04-19 DIAGNOSIS — E66.01 MORBID OBESITY DUE TO EXCESS CALORIES (HCC): Primary | ICD-10-CM

## 2017-04-19 NOTE — PROGRESS NOTES
1. Have you been to the ER, urgent care clinic since your last visit? Hospitalized since your last visit? No    2. Have you seen or consulted any other health care providers outside of the [address] [place] [address] since your last visit? Include any pap smears or colon screening.  Chiropractor, psyche

## 2017-04-19 NOTE — MR AVS SNAPSHOT
Visit Information Date & Time Provider Department Dept. Phone Encounter #  
 4/19/2017  9:40 AM Elizabeth Ivory NP [facility] 22 [phone] [id number] Follow-up Instructions Return in about 2 weeks (around 5/3/2017). Follow-up and Disposition History Your Appointments 5/2/2017  7:45 AM  
ACUTE CARE with Kane Abbott MD  
[facility]) Appt Note: 6 mnth fu thyroid [address] [place]  [address] [place] [address]  
  
   
 [address] [facility]  
  
    
 5/4/2017  8:40 AM  
POST OP 10 MIN with Jos Bird NP  
[facility] 22 [phone] ([facility]) Appt Note: 4-20-17 EB:laparoscopic cholecystectomy with cholangiography. po  
 [address] [place] [address] [address] [place] [phone]  
[address] [place] [address] Upcoming Health Maintenance Date Due  
 FOOT EXAM Q1 5/31/1979 INFLUENZA AGE 9 TO ADULT 8/1/2016 MICROALBUMIN Q1 10/13/2016 EYE EXAM RETINAL OR DILATED Q1 2/8/2017 HEMOGLOBIN A1C Q6M 5/2/2017 LIPID PANEL Q1 6/27/2017 DTaP/Tdap/Td series (2 - Td) 11/12/2023 Allergies as of 4/19/2017  Review Complete On: 4/19/2017 By: Elizabeth Ivory NP Severity Noted Reaction Type Reactions Sulfa (Sulfonamide Antibiotics) High 03/04/2011   Side Effect Anaphylaxis, Other (comments) Difficulty breathing/high fever/delusional    
 Aleve [Naproxen Sodium]  09/30/2013    Nausea Only Codeine  03/04/2011   Side Effect Nausea Only Ibuprofen  03/04/2011   Side Effect Hives Lactose  02/23/2016   Intolerance Other (comments) Gas Penicillin G  03/04/2011   Side Effect Hives Current Immunizations  Reviewed on 7/11/2016 Name Date Influenza Vaccine 11/12/2013 Influenza Vaccine (Quad) PF 10/13/2015 Influenza Vaccine (Whole Virus) 3/1/2013 Influenza Vaccine PF 10/23/2014 Influenza Vaccine Split 11/2/2011 Pneumococcal Polysaccharide (PPSV-23) 3/11/2015 Tdap 11/12/2013 Not reviewed this visit You Were Diagnosed With   
  
 Codes Comments Morbid [facility] due to excess calories [facility]    -  Primary ICD-10-CM: E66.01 
ICD-9-CM: 278.01 Vitals BP Pulse Temp Resp Height(growth percentile) Weight(growth percentile) 140/80 (BP 1 Location: Right arm, BP Patient Position: Sitting) 80 98.2 °F (36.8 °C) 20 5' 3\" (1.6 m) 168 lb 8 oz (76.4 kg) LMP SpO2 BMI OB Status Smoking Status 01/29/2011 98% 29.85 kg/m2 Hysterectomy Former Smoker Vitals History BMI and BSA Data Body Mass Index Body Surface Area  
 29.85 kg/m 2 1.84 m 2 Preferred Pharmacy Pharmacy Name Phone [id number], [address] [phone] Your Updated Medication List  
  
   
This list is accurate as of: 4/19/17 10:52 AM.  Always use your most recent med list.  
  
  
  
  
 ascorbic acid (vitamin C) 250 mg tablet Commonly known as:  VITAMIN C Take 500 mg by mouth two (2) times a day. AYR SALINE topical gel Generic drug:  sodium chloride-aloe vera  
by Nasal route daily as needed (dry nares). Benefiber Clear 3 gram/3.5 gram Powd Generic drug:  wheat dextrin Take  by mouth daily. 2 Teaspoons daily AT 7AM AND 5PM  
  
 buPROPion  mg SR tablet Commonly known as:  WELLBUTRIN SR  
TAKE 1 TABLET BY MOUTH EVERY DAY  
  
 busPIRone 5 mg tablet Commonly known as:  BUSPAR Take 10 mg by mouth as needed. butalbital-acetaminophen-caffeine -40 mg per tablet Commonly known as:  Siri Olive Take 1 Tab by mouth every six (6) hours as needed for Pain. Max Daily Amount: 4 Tabs. calcium citrate-vitamin d3 315-200 mg-unit Tab Commonly known as:  CITRACAL+D Take 2 Tabs by mouth two (2) times daily (with meals).  Indications: Pt states, \"medication has been change to take at noon and dinner. \"  
  
 cetirizine 10 mg tablet Commonly known as:  ZYRTEC Take  by mouth nightly. CULTURELLE PO Take 1 Tab by mouth daily. cyanocobalamin 1,000 mcg tablet Take 5,000 mcg by mouth daily. cyclobenzaprine 10 mg tablet Commonly known as:  FLEXERIL  
TAKE 1 TABLET BY MOUTH 3 TIMES A DAY AS NEEDED MUSCLE SPASM  
  
 escitalopram oxalate 20 mg tablet Commonly known as:  Leilani Gins TAKE 1 TAB BY MOUTH DAILY. FLINTSTONES COMPLETE Chew Generic drug:  pediatric multivitamin no.76 Take 1 Tab by mouth daily. FLONASE 50 mcg/actuation nasal spray Generic drug:  fluticasone 2 Sprays by Both Nostrils route nightly as needed for Rhinitis (as needed). gabapentin 300 mg capsule Commonly known as:  NEURONTIN  
TAKE 1 CAPSULES BY MOUTH IN THE MORNING,, 1 AT NOON, 2 CAPS AT BEDTIME  
  
 hyoscyamine SL 0.125 mg SL tablet Commonly known as:  LEVSIN/SL  
1 Tab by SubLINGual route every four (4) hours as needed for Cramping.  
  
 ketoconazole 2 % topical cream  
Commonly known as:  NIZORAL Apply  to affected area daily. lansoprazole 30 mg capsule Commonly known as:  PREVACID TAKE 1 CAP BY MOUTH DAILY. magnesium 250 mg Tab Take 500 mg by mouth daily. MIRALAX 17 gram packet Generic drug:  polyethylene glycol Take 17 g by mouth daily. ondansetron 4 mg disintegrating tablet Commonly known as:  ZOFRAN ODT  
TAKE 1 TABLET BY MOUTH EVERY 8 HOURS AS NEEDED FOR NAUSEA  
  
 OTHER  
C-PAP Machine * SYNTHROID 175 mcg tablet Generic drug:  levothyroxine Take 1 Tab by mouth Daily (before breakfast). * SYNTHROID 175 mcg tablet Generic drug:  levothyroxine TAKE 1 TABLET BY MOUTH EVERY DAY  
  
 SYSTANE (PROPYLENE GLYCOL) 0.4-0.3 % Drop Generic drug:  peg 400-propylene glycol Administer 1 Drop to both eyes as needed. TYLENOL EXTRA STRENGTH 500 mg tablet Generic drug:  acetaminophen Take 1,000 mg by mouth every six (6) hours as needed for Pain. VITAMIN D3 5,000 unit Tab tablet Generic drug:  cholecalciferol (VITAMIN D3) Take 1,000 Units by mouth two (2) times a day. LIQUID SOFT GEL * Notice: This list has 2 medication(s) that are the same as other medications prescribed for you. Read the directions carefully, and ask your doctor or other care provider to review them with you. Follow-up Instructions Return in about 2 weeks (around 5/3/2017). Patient Instructions Learning About Cholecystectomy What is cholecystectomy Cholecystectomy (say \"koh-shara-sis-EMMANUEL-tuh-jannette\") is surgery to remove the gallbladder and gallstones. The gallbladder stores bile made by your liver. The bile helps you digest fats. Gallstones are made of cholesterol and other things found in bile. Your body will work fine without a gallbladder. Bile will go straight from the liver to the intestine. There may be small changes in how you digest food. But you probably will not notice them. How is the surgery done? Cholecystectomy is usually laparoscopic surgery. To do this type of surgery, a doctor puts a lighted tube, or scope, and other surgical tools through small cuts (incisions) in your belly. The doctor is able to see your organs with the scope. After your gallbladder is removed, you will no longer have gallstones. The cuts leave scars that usually fade with time. Open surgery may be done if problems are found during laparoscopic surgery. With open surgery, the gallbladder is removed through one larger cut in your belly. And the hospital stay is longer. What can you expect after surgery? It is normal to feel weak and tired for several days after you return home. Your belly may be swollen. If you had laparoscopic surgery, you may also have pain in your shoulder for about 24 hours.  
You may have gas or need to burp a lot at first. 
 A few people get diarrhea. The diarrhea usually goes away in 2 to 4 weeks. But it may last longer. How quickly you get better depends on which kind of surgery you had. For laparoscopic surgery, most people can go back to work or their normal routine in 1 to 2 weeks. It depends on the type of work you do and how you feel. If you have open surgery, it will probably take 4 to 6 weeks before you get back to your normal routine. Follow-up care is a key part of your treatment and safety. Be sure to make and go to all appointments, and call your doctor if you are having problems. It's also a good idea to know your test results and keep a list of the medicines you take. Where can you learn more? Go to http://josee-gutierrez.info/. Enter U037 in the search box to learn more about \"Learning About Cholecystectomy. \" Current as of: August 9, 2016 Content Version: 11.2 © 0459-8884 "[employer]. Care instructions adapted under license by [employer] 2000 (which disclaims liability or warranty for this information). If you have questions about a medical condition or this instruction, always ask your healthcare professional. [place] [address] any warranty or liability for your use of this information. Introducing [facility] & HEALTH SERVICES! Dear Yaya Arboleda: 
Thank you for requesting a [employer] account. Our records indicate that you already have an active [employer] account. You can access your account anytime at [email or web address] Did you know that you can access your hospital and ER discharge instructions at any time in [employer]? You can also review all of your test results from your hospital stay or ER visit. Additional Information If you have questions, please visit the Frequently Asked Questions section of the [employer] website at [email or web address] Remember, [employer] is NOT to be used for urgent needs.  For medical emergencies, dial 911. Now available from your iPhone and Android! Please provide this summary of care documentation to your next provider. Your primary care clinician is listed as Ange Kim. If you have any questions after today's visit, please call [phone].

## 2017-04-19 NOTE — PATIENT INSTRUCTIONS
Learning About Cholecystectomy  What is cholecystectomy  Cholecystectomy (say \"koh-shara-sis-EMMANUEL-tuh-jannette\") is surgery to remove the gallbladder and gallstones. The gallbladder stores bile made by your liver. The bile helps you digest fats. Gallstones are made of cholesterol and other things found in bile. Your body will work fine without a gallbladder. Bile will go straight from the liver to the intestine. There may be small changes in how you digest food. But you probably will not notice them. How is the surgery done? Cholecystectomy is usually laparoscopic surgery. To do this type of surgery, a doctor puts a lighted tube, or scope, and other surgical tools through small cuts (incisions) in your belly. The doctor is able to see your organs with the scope. After your gallbladder is removed, you will no longer have gallstones. The cuts leave scars that usually fade with time. Open surgery may be done if problems are found during laparoscopic surgery. With open surgery, the gallbladder is removed through one larger cut in your belly. And the hospital stay is longer. What can you expect after surgery? It is normal to feel weak and tired for several days after you return home. Your belly may be swollen. If you had laparoscopic surgery, you may also have pain in your shoulder for about 24 hours. You may have gas or need to burp a lot at first.  A few people get diarrhea. The diarrhea usually goes away in 2 to 4 weeks. But it may last longer. How quickly you get better depends on which kind of surgery you had. For laparoscopic surgery, most people can go back to work or their normal routine in 1 to 2 weeks. It depends on the type of work you do and how you feel. If you have open surgery, it will probably take 4 to 6 weeks before you get back to your normal routine. Follow-up care is a key part of your treatment and safety.  Be sure to make and go to all appointments, and call your doctor if you are having problems. It's also a good idea to know your test results and keep a list of the medicines you take. Where can you learn more? Go to http://josee-gutierrez.info/. Enter F127 in the search box to learn more about \"Learning About Cholecystectomy. \"  Current as of: August 9, 2016  Content Version: 11.2  © 6038-3490 [employer]. Care instructions adapted under license by [employer] (which disclaims liability or warranty for this information). If you have questions about a medical condition or this instruction, always ask your healthcare professional. [place] [address] any warranty or liability for your use of this information.

## 2017-04-19 NOTE — PROGRESS NOTES
Juma Amos is a 51 y.o. female 9 months s/p lap malabsorptive gastric bypass down 100 pounds. Weight today is 168 pounds. Patient having cholecystectomy tomorrow with Dr. Clemencia Harris, came in today for regular bariatric follow up, complaint of intermittent right/left upper abdominal pain and nausea. Taking Zofran Denies nausea, no vomiting, no heartburn/reflux. Denies dysphagia. No fever/no chills, no shortness of breath, no chest pain, and no abdominal pain. Tolerating all foods, getting 60 or more gram of protein daily. Mindful of fat and sugar intake because causing nausea. Protein supplementation in use daily. Snacking with fruit/peanut butter. Drinking 64+ ounces of water daily. Tolerating all vitamins and medications. Exercising with walking and gardening. Bowel movements once daily that are loose to formed. Miralax in use daily         HPI    Review of Systems   Constitutional: Negative for chills, fever and malaise/fatigue. Respiratory: Negative for cough, sputum production and shortness of breath. Cardiovascular: Negative for chest pain, palpitations and leg swelling. Gastrointestinal: Positive for abdominal pain and nausea. Negative for blood in stool, constipation, diarrhea, heartburn and vomiting. Genitourinary: Negative for dysuria. Neurological: Negative for dizziness. Physical Exam   Constitutional: She is oriented to person, place, and time. She appears well-developed and well-nourished. No distress. Cardiovascular: Normal rate, regular rhythm and normal heart sounds. Pulmonary/Chest: Effort normal and breath sounds normal.   Abdominal: Soft. Bowel sounds are normal. She exhibits no distension. There is tenderness. There is no rebound and no guarding. Complaint of mild right and left upper quadrant tenderness. Incisional hernia X2 , small, midline below the umbilicus. Reducible and mild tenderness with deep palpation. Musculoskeletal: Normal range of motion.  She exhibits no edema. Neurological: She is alert and oriented to person, place, and time. Skin: Skin is warm and dry. No rash noted. No erythema. Psychiatric: She has a normal mood and affect. Her behavior is normal.   Blood pressure 140/80, pulse 80, temperature 98.2 °F (36.8 °C), resp. rate 20, height 5' 3\" (1.6 m), weight 168 lb 8 oz (76.4 kg), last menstrual period 01/29/2011, SpO2 98 %. ASSESSMENT and PLAN  Morbid Obesity 9 months s/p lap malabsorptive gastric bypass down 100 pounds. Weight today is 168 pounds. Laparoscopic cholecystectomy tomorrow. Commended patient on nutrition. Advised patient regard to diet that is high-protein, low-fat, low-sugar, limited carbohydrates. Strive for 60 grams of protein daily. If having a snack, foods that are protein or fiber rich. Still pay attention to behavioral factor and habits. No eating/drinking together, chew foods well, and portion control. Drink at least 40 ounces of non-carbonated, non-calorie beverages daily. Continue vitamin regiment daily. Exercise at least 3 days a week with cardiovascular and strength training as tolerated. Will forego labs at this time, previous labs within appropriate range. Patient to follow up in 2 weeks after cholecystectomy and 3 months for bariatric follow up. Patient verbalized understanding and questions were answered to the best of my knowledge and ability. Pre-op cholecystectomy educational materials were provided. Advised to call office if any questions/concerns. 18 minutes was spent with patient.

## 2017-04-20 ENCOUNTER — APPOINTMENT (OUTPATIENT)
Dept: GENERAL RADIOLOGY | Age: 47
End: 2017-04-20
Attending: SURGERY
Payer: COMMERCIAL

## 2017-04-20 ENCOUNTER — ANESTHESIA (OUTPATIENT)
Dept: SURGERY | Age: 47
End: 2017-04-20
Payer: COMMERCIAL

## 2017-04-20 ENCOUNTER — HOSPITAL ENCOUNTER (OUTPATIENT)
Age: 47
Setting detail: OUTPATIENT SURGERY
Discharge: HOME | End: 2017-04-20
Attending: SURGERY | Admitting: SURGERY
Payer: COMMERCIAL

## 2017-04-20 VITALS
WEIGHT: 169 LBS | TEMPERATURE: 98 F | HEART RATE: 70 BPM | OXYGEN SATURATION: 95 % | DIASTOLIC BLOOD PRESSURE: 54 MMHG | HEIGHT: 63 IN | SYSTOLIC BLOOD PRESSURE: 117 MMHG | BODY MASS INDEX: 29.95 KG/M2 | RESPIRATION RATE: 16 BRPM

## 2017-04-20 LAB
DAILY QC (YES/NO)?: YES
HGB BLD-MCNC: 14 G/DL (ref 11.5–16)

## 2017-04-20 PROCEDURE — 74011250636 HC RX REV CODE- 250/636

## 2017-04-20 PROCEDURE — 77030020053 HC ELECTRD LAPSCP COVD -B: Performed by: SURGERY

## 2017-04-20 PROCEDURE — 77030020747 HC TU INSUF ENDOSC TELE -A: Performed by: SURGERY

## 2017-04-20 PROCEDURE — 77030026438 HC STYL ET INTUB CARD -A: Performed by: ANESTHESIOLOGY

## 2017-04-20 PROCEDURE — 77030012022 HC APPL CLP ENDOSC COVD -C: Performed by: SURGERY

## 2017-04-20 PROCEDURE — 77030009852 HC PCH RTVR ENDOSC COVD -B: Performed by: SURGERY

## 2017-04-20 PROCEDURE — 76060000033 HC ANESTHESIA 1 TO 1.5 HR: Performed by: SURGERY

## 2017-04-20 PROCEDURE — 74011000250 HC RX REV CODE- 250: Performed by: SURGERY

## 2017-04-20 PROCEDURE — 74011000250 HC RX REV CODE- 250

## 2017-04-20 PROCEDURE — 74300 X-RAY BILE DUCTS/PANCREAS: CPT

## 2017-04-20 PROCEDURE — 88304 TISSUE EXAM BY PATHOLOGIST: CPT | Performed by: SURGERY

## 2017-04-20 PROCEDURE — 77030035048 HC TRCR ENDOSC OPTCL COVD -B: Performed by: SURGERY

## 2017-04-20 PROCEDURE — 76210000021 HC REC RM PH II 0.5 TO 1 HR: Performed by: SURGERY

## 2017-04-20 PROCEDURE — 77030011640 HC PAD GRND REM COVD -A: Performed by: SURGERY

## 2017-04-20 PROCEDURE — 74011636320 HC RX REV CODE- 636/320: Performed by: SURGERY

## 2017-04-20 PROCEDURE — 74011250636 HC RX REV CODE- 250/636: Performed by: ANESTHESIOLOGY

## 2017-04-20 PROCEDURE — 77030008684 HC TU ET CUF COVD -B: Performed by: ANESTHESIOLOGY

## 2017-04-20 PROCEDURE — 77030035045 HC TRCR ENDOSC VRSPRT BLDLSS COVD -B: Performed by: SURGERY

## 2017-04-20 PROCEDURE — 85018 HEMOGLOBIN: CPT | Performed by: SURGERY

## 2017-04-20 PROCEDURE — 76010000149 HC OR TIME 1 TO 1.5 HR: Performed by: SURGERY

## 2017-04-20 PROCEDURE — 77030032490 HC SLV COMPR SCD KNE COVD -B: Performed by: SURGERY

## 2017-04-20 PROCEDURE — 77030037032 HC INSRT SCIS CLICKLLINE DISP STOR -B: Performed by: SURGERY

## 2017-04-20 PROCEDURE — 74011250636 HC RX REV CODE- 250/636: Performed by: SURGERY

## 2017-04-20 PROCEDURE — 76210000006 HC OR PH I REC 0.5 TO 1 HR: Performed by: SURGERY

## 2017-04-20 PROCEDURE — 77030002933 HC SUT MCRYL J&J -A: Performed by: SURGERY

## 2017-04-20 PROCEDURE — 77030031139 HC SUT VCRL2 J&J -A: Performed by: SURGERY

## 2017-04-20 PROCEDURE — 77030013079 HC BLNKT BAIR HGGR 3M -A: Performed by: ANESTHESIOLOGY

## 2017-04-20 PROCEDURE — 77030010507 HC ADH SKN DERMBND J&J -B: Performed by: SURGERY

## 2017-04-20 PROCEDURE — 77030009403 HC ELECTRD ENDO MEGA -B: Performed by: SURGERY

## 2017-04-20 PROCEDURE — 77030002895 HC DEV VASC CLOSR COVD -B: Performed by: SURGERY

## 2017-04-20 RX ORDER — ONDANSETRON 2 MG/ML
INJECTION INTRAMUSCULAR; INTRAVENOUS AS NEEDED
Status: DISCONTINUED | OUTPATIENT
Start: 2017-04-20 | End: 2017-04-20 | Stop reason: HOSPADM

## 2017-04-20 RX ORDER — FENTANYL CITRATE 50 UG/ML
25 INJECTION, SOLUTION INTRAMUSCULAR; INTRAVENOUS
Status: DISCONTINUED | OUTPATIENT
Start: 2017-04-20 | End: 2017-04-20 | Stop reason: HOSPADM

## 2017-04-20 RX ORDER — MIDAZOLAM HYDROCHLORIDE 1 MG/ML
1 INJECTION, SOLUTION INTRAMUSCULAR; INTRAVENOUS AS NEEDED
Status: DISCONTINUED | OUTPATIENT
Start: 2017-04-20 | End: 2017-04-20 | Stop reason: HOSPADM

## 2017-04-20 RX ORDER — SODIUM CHLORIDE, SODIUM LACTATE, POTASSIUM CHLORIDE, CALCIUM CHLORIDE 600; 310; 30; 20 MG/100ML; MG/100ML; MG/100ML; MG/100ML
125 INJECTION, SOLUTION INTRAVENOUS CONTINUOUS
Status: DISCONTINUED | OUTPATIENT
Start: 2017-04-20 | End: 2017-04-20 | Stop reason: HOSPADM

## 2017-04-20 RX ORDER — SUCCINYLCHOLINE CHLORIDE 20 MG/ML
INJECTION INTRAMUSCULAR; INTRAVENOUS AS NEEDED
Status: DISCONTINUED | OUTPATIENT
Start: 2017-04-20 | End: 2017-04-20 | Stop reason: HOSPADM

## 2017-04-20 RX ORDER — FENTANYL CITRATE 50 UG/ML
INJECTION, SOLUTION INTRAMUSCULAR; INTRAVENOUS AS NEEDED
Status: DISCONTINUED | OUTPATIENT
Start: 2017-04-20 | End: 2017-04-20 | Stop reason: HOSPADM

## 2017-04-20 RX ORDER — PROPOFOL 10 MG/ML
INJECTION, EMULSION INTRAVENOUS AS NEEDED
Status: DISCONTINUED | OUTPATIENT
Start: 2017-04-20 | End: 2017-04-20 | Stop reason: HOSPADM

## 2017-04-20 RX ORDER — MIDAZOLAM HYDROCHLORIDE 1 MG/ML
1 INJECTION, SOLUTION INTRAMUSCULAR; INTRAVENOUS
Status: DISCONTINUED | OUTPATIENT
Start: 2017-04-20 | End: 2017-04-20 | Stop reason: HOSPADM

## 2017-04-20 RX ORDER — SODIUM CHLORIDE 0.9 % (FLUSH) 0.9 %
5-10 SYRINGE (ML) INJECTION EVERY 8 HOURS
Status: DISCONTINUED | OUTPATIENT
Start: 2017-04-20 | End: 2017-04-20 | Stop reason: HOSPADM

## 2017-04-20 RX ORDER — SODIUM CHLORIDE 0.9 % (FLUSH) 0.9 %
5-10 SYRINGE (ML) INJECTION AS NEEDED
Status: DISCONTINUED | OUTPATIENT
Start: 2017-04-20 | End: 2017-04-20 | Stop reason: HOSPADM

## 2017-04-20 RX ORDER — SODIUM CHLORIDE, SODIUM LACTATE, POTASSIUM CHLORIDE, CALCIUM CHLORIDE 600; 310; 30; 20 MG/100ML; MG/100ML; MG/100ML; MG/100ML
INJECTION, SOLUTION INTRAVENOUS
Status: DISCONTINUED | OUTPATIENT
Start: 2017-04-20 | End: 2017-04-20 | Stop reason: HOSPADM

## 2017-04-20 RX ORDER — KETAMINE HYDROCHLORIDE 10 MG/ML
INJECTION, SOLUTION INTRAMUSCULAR; INTRAVENOUS AS NEEDED
Status: DISCONTINUED | OUTPATIENT
Start: 2017-04-20 | End: 2017-04-20 | Stop reason: HOSPADM

## 2017-04-20 RX ORDER — ONDANSETRON 2 MG/ML
4 INJECTION INTRAMUSCULAR; INTRAVENOUS AS NEEDED
Status: DISCONTINUED | OUTPATIENT
Start: 2017-04-20 | End: 2017-04-20 | Stop reason: HOSPADM

## 2017-04-20 RX ORDER — DIPHENHYDRAMINE HYDROCHLORIDE 50 MG/ML
12.5 INJECTION, SOLUTION INTRAMUSCULAR; INTRAVENOUS AS NEEDED
Status: DISCONTINUED | OUTPATIENT
Start: 2017-04-20 | End: 2017-04-20 | Stop reason: HOSPADM

## 2017-04-20 RX ORDER — HYDROCODONE BITARTRATE AND ACETAMINOPHEN 5; 325 MG/1; MG/1
1 TABLET ORAL
Qty: 30 TAB | Refills: 0 | Status: SHIPPED | OUTPATIENT
Start: 2017-04-20 | End: 2017-04-25 | Stop reason: ALTCHOICE

## 2017-04-20 RX ORDER — HYDROMORPHONE HYDROCHLORIDE 1 MG/ML
0.2 INJECTION, SOLUTION INTRAMUSCULAR; INTRAVENOUS; SUBCUTANEOUS
Status: DISCONTINUED | OUTPATIENT
Start: 2017-04-20 | End: 2017-04-20 | Stop reason: HOSPADM

## 2017-04-20 RX ORDER — ACETAMINOPHEN 10 MG/ML
INJECTION, SOLUTION INTRAVENOUS AS NEEDED
Status: DISCONTINUED | OUTPATIENT
Start: 2017-04-20 | End: 2017-04-20 | Stop reason: HOSPADM

## 2017-04-20 RX ORDER — BUPIVACAINE HYDROCHLORIDE AND EPINEPHRINE 5; 5 MG/ML; UG/ML
30 INJECTION, SOLUTION EPIDURAL; INTRACAUDAL; PERINEURAL ONCE
Status: COMPLETED | OUTPATIENT
Start: 2017-04-20 | End: 2017-04-20

## 2017-04-20 RX ORDER — FENTANYL CITRATE 50 UG/ML
50 INJECTION, SOLUTION INTRAMUSCULAR; INTRAVENOUS AS NEEDED
Status: DISCONTINUED | OUTPATIENT
Start: 2017-04-20 | End: 2017-04-20 | Stop reason: HOSPADM

## 2017-04-20 RX ORDER — MIDAZOLAM HYDROCHLORIDE 1 MG/ML
INJECTION, SOLUTION INTRAMUSCULAR; INTRAVENOUS AS NEEDED
Status: DISCONTINUED | OUTPATIENT
Start: 2017-04-20 | End: 2017-04-20 | Stop reason: HOSPADM

## 2017-04-20 RX ORDER — LIDOCAINE HYDROCHLORIDE 10 MG/ML
0.5 INJECTION, SOLUTION EPIDURAL; INFILTRATION; INTRACAUDAL; PERINEURAL AS NEEDED
Status: DISCONTINUED | OUTPATIENT
Start: 2017-04-20 | End: 2017-04-20 | Stop reason: HOSPADM

## 2017-04-20 RX ORDER — DEXTROSE, SODIUM CHLORIDE, SODIUM LACTATE, POTASSIUM CHLORIDE, AND CALCIUM CHLORIDE 5; .6; .31; .03; .02 G/100ML; G/100ML; G/100ML; G/100ML; G/100ML
125 INJECTION, SOLUTION INTRAVENOUS CONTINUOUS
Status: DISCONTINUED | OUTPATIENT
Start: 2017-04-20 | End: 2017-04-20 | Stop reason: HOSPADM

## 2017-04-20 RX ORDER — ROCURONIUM BROMIDE 10 MG/ML
INJECTION, SOLUTION INTRAVENOUS AS NEEDED
Status: DISCONTINUED | OUTPATIENT
Start: 2017-04-20 | End: 2017-04-20 | Stop reason: HOSPADM

## 2017-04-20 RX ORDER — DEXAMETHASONE SODIUM PHOSPHATE 4 MG/ML
INJECTION, SOLUTION INTRA-ARTICULAR; INTRALESIONAL; INTRAMUSCULAR; INTRAVENOUS; SOFT TISSUE AS NEEDED
Status: DISCONTINUED | OUTPATIENT
Start: 2017-04-20 | End: 2017-04-20 | Stop reason: HOSPADM

## 2017-04-20 RX ORDER — NEOSTIGMINE METHYLSULFATE 1 MG/ML
INJECTION INTRAVENOUS AS NEEDED
Status: DISCONTINUED | OUTPATIENT
Start: 2017-04-20 | End: 2017-04-20 | Stop reason: HOSPADM

## 2017-04-20 RX ORDER — MORPHINE SULFATE 10 MG/ML
2 INJECTION, SOLUTION INTRAMUSCULAR; INTRAVENOUS
Status: DISCONTINUED | OUTPATIENT
Start: 2017-04-20 | End: 2017-04-20 | Stop reason: HOSPADM

## 2017-04-20 RX ORDER — GLYCOPYRROLATE 0.2 MG/ML
INJECTION INTRAMUSCULAR; INTRAVENOUS AS NEEDED
Status: DISCONTINUED | OUTPATIENT
Start: 2017-04-20 | End: 2017-04-20 | Stop reason: HOSPADM

## 2017-04-20 RX ORDER — HYDROMORPHONE HYDROCHLORIDE 2 MG/ML
INJECTION, SOLUTION INTRAMUSCULAR; INTRAVENOUS; SUBCUTANEOUS AS NEEDED
Status: DISCONTINUED | OUTPATIENT
Start: 2017-04-20 | End: 2017-04-20 | Stop reason: HOSPADM

## 2017-04-20 RX ORDER — CEFAZOLIN SODIUM IN 0.9 % NACL 2 G/50 ML
2 INTRAVENOUS SOLUTION, PIGGYBACK (ML) INTRAVENOUS ONCE
Status: COMPLETED | OUTPATIENT
Start: 2017-04-20 | End: 2017-04-20

## 2017-04-20 RX ORDER — OXYCODONE HYDROCHLORIDE 5 MG/1
5 TABLET ORAL AS NEEDED
Status: DISCONTINUED | OUTPATIENT
Start: 2017-04-20 | End: 2017-04-20 | Stop reason: HOSPADM

## 2017-04-20 RX ADMIN — ONDANSETRON 4 MG: 2 INJECTION INTRAMUSCULAR; INTRAVENOUS at 13:45

## 2017-04-20 RX ADMIN — ROCURONIUM BROMIDE 10 MG: 10 INJECTION, SOLUTION INTRAVENOUS at 12:59

## 2017-04-20 RX ADMIN — ONDANSETRON 4 MG: 2 INJECTION INTRAMUSCULAR; INTRAVENOUS at 14:38

## 2017-04-20 RX ADMIN — ROCURONIUM BROMIDE 25 MG: 10 INJECTION, SOLUTION INTRAVENOUS at 12:53

## 2017-04-20 RX ADMIN — ACETAMINOPHEN 1000 MG: 10 INJECTION, SOLUTION INTRAVENOUS at 13:00

## 2017-04-20 RX ADMIN — SUCCINYLCHOLINE CHLORIDE 120 MG: 20 INJECTION INTRAMUSCULAR; INTRAVENOUS at 12:46

## 2017-04-20 RX ADMIN — CEFAZOLIN 2 G: 1 INJECTION, POWDER, FOR SOLUTION INTRAMUSCULAR; INTRAVENOUS; PARENTERAL at 12:50

## 2017-04-20 RX ADMIN — PROPOFOL 170 MG: 10 INJECTION, EMULSION INTRAVENOUS at 12:46

## 2017-04-20 RX ADMIN — ROCURONIUM BROMIDE 5 MG: 10 INJECTION, SOLUTION INTRAVENOUS at 12:46

## 2017-04-20 RX ADMIN — FENTANYL CITRATE 100 MCG: 50 INJECTION, SOLUTION INTRAMUSCULAR; INTRAVENOUS at 12:46

## 2017-04-20 RX ADMIN — GLYCOPYRROLATE 0.4 MG: 0.2 INJECTION INTRAMUSCULAR; INTRAVENOUS at 13:45

## 2017-04-20 RX ADMIN — KETAMINE HYDROCHLORIDE 30 MG: 10 INJECTION, SOLUTION INTRAMUSCULAR; INTRAVENOUS at 12:46

## 2017-04-20 RX ADMIN — DEXAMETHASONE SODIUM PHOSPHATE 8 MG: 4 INJECTION, SOLUTION INTRA-ARTICULAR; INTRALESIONAL; INTRAMUSCULAR; INTRAVENOUS; SOFT TISSUE at 13:10

## 2017-04-20 RX ADMIN — SODIUM CHLORIDE, SODIUM LACTATE, POTASSIUM CHLORIDE, AND CALCIUM CHLORIDE 125 ML/HR: 600; 310; 30; 20 INJECTION, SOLUTION INTRAVENOUS at 11:33

## 2017-04-20 RX ADMIN — NEOSTIGMINE METHYLSULFATE 2.5 MG: 1 INJECTION INTRAVENOUS at 13:45

## 2017-04-20 RX ADMIN — HYDROMORPHONE HYDROCHLORIDE 0.5 MG: 2 INJECTION, SOLUTION INTRAMUSCULAR; INTRAVENOUS; SUBCUTANEOUS at 14:10

## 2017-04-20 RX ADMIN — SODIUM CHLORIDE, SODIUM LACTATE, POTASSIUM CHLORIDE, CALCIUM CHLORIDE: 600; 310; 30; 20 INJECTION, SOLUTION INTRAVENOUS at 12:35

## 2017-04-20 RX ADMIN — ROCURONIUM BROMIDE 10 MG: 10 INJECTION, SOLUTION INTRAVENOUS at 13:09

## 2017-04-20 RX ADMIN — HYDROMORPHONE HYDROCHLORIDE 0.5 MG: 2 INJECTION, SOLUTION INTRAMUSCULAR; INTRAVENOUS; SUBCUTANEOUS at 12:35

## 2017-04-20 RX ADMIN — MIDAZOLAM HYDROCHLORIDE 2 MG: 1 INJECTION, SOLUTION INTRAMUSCULAR; INTRAVENOUS at 12:35

## 2017-04-20 RX ADMIN — HYDROMORPHONE HYDROCHLORIDE 0.5 MG: 2 INJECTION, SOLUTION INTRAMUSCULAR; INTRAVENOUS; SUBCUTANEOUS at 13:56

## 2017-04-20 NOTE — INTERVAL H&P NOTE
H&P Update:  Alyssa Gipson was seen and examined. History and physical has been reviewed. The patient has been examined. There have been no significant clinical changes since the completion of the originally dated History and Physical. She continues to have biliary colic. US: IMPRESSION:   The liver is top normal in size. There may be small amount of gallbladder sludge without gallstones. There is no biliary duct dilatation.     Signed By: Moe Tim MD     April 20, 2017 11:13 AM

## 2017-04-20 NOTE — ROUTINE PROCESS
Patient: Henrietta Turcios MRN: [id number]  SSN: [id number]   YOB: 1970  Age: 51 y.o.   Sex: female     Patient is status post Procedure(s):  LAPAROSCOPIC CHOLECYSTECTOMY WITH GRAM .    Surgeon(s) and Role:     * Lux Reilly MD - Primary    Local/Dose/Irrigation:                    Peripheral IV 04/20/17 Left Arm (Active)                           Dressing/Packing:  Wound Abdomen Anterior-DRESSING TYPE: Topical skin adhesive/glue (04/20/17 1334)  Splint/Cast:  ]    Other:

## 2017-04-20 NOTE — IP AVS SNAPSHOT
[address] 
[phone] Patient: Meredith Wu MRN: [id number] NIB:[birth year]03[birth year] You are allergic to the following Allergen Reactions Sulfa (Sulfonamide Antibiotics) Anaphylaxis Other (comments) Difficulty breathing/high fever/delusional    
    
 Aleve (Naproxen Sodium) Nausea Only Codeine Nausea Only Ibuprofen Hives Lactose Other (comments) Gas Penicillin G Hives Recent Documentation Height Weight BMI OB Status Smoking Status 1.6 m 76.7 kg 29.94 kg/m2 Hysterectomy Former Smoker Emergency Contacts Name Discharge Info Relation Home Work Mobile Guo Prudent DISCHARGE CAREGIVER [3] Spouse [3] [phone] About your hospitalization You were admitted on:  April 20, 2017 [facility] You were discharged on:  April 20, 2017 Unit phone number:  [phone] Why you were hospitalized Your primary diagnosis was:  Not on File Providers Seen During Your Hospitalizations Provider Role Specialty Primary office phone Sri Lozano MD Attending Provider General Surgery [phone] Your Primary Care Physician (PCP) Primary Care Physician Office Phone Office Fax Kendall Carrillo [phone] Follow-up Information Follow up With Details Comments Contact Info Kane Abbott MD   [address] [facility] [address] [facility] [address] 
[phone] Sri Lzoano MD Follow up in 2 week(s) call to schedule an appointment [address] [place] [address] At [facility] [phone] [place] [address] 
[phone] Your Appointments Tuesday May 02, 2017  7:45 AM EDT ACUTE CARE with Kane Abbott MD  
[facility] [address]) [address] [place] [address]  
[phone] Thursday May 04, 2017  8:40 AM EDT  
POST OP 10 MIN with Vika Bro NP  
[facility] 22 [address] ([address]) [address] [place] [address]  
[phone] Current Discharge Medication List  
  
START taking these medications Dose & Instructions Dispensing Information Comments Morning Noon Evening Bedtime * HYDROcodone-acetaminophen 5-325 mg per tablet Commonly known as:  Flor Corona Your last dose was: Your next dose is:    
   
   
 Dose:  1 Tab Take 1 Tab by mouth every six (6) hours as needed for Pain (may take two tabs if pain is severe). Max Daily Amount: 4 Tabs. Quantity:  30 Tab Refills:  0  
     
   
   
   
  
 * HYDROcodone-acetaminophen 5-325 mg per tablet Commonly known as:  Flor Corona Your last dose was: Your next dose is:    
   
   
 Dose:  1 Tab Take 1 Tab by mouth every six (6) hours as needed for Pain (may take two tabs if pain is severe). Max Daily Amount: 4 Tabs. Quantity:  30 Tab Refills:  0  
     
   
   
   
  
 * Notice: This list has 2 medication(s) that are the same as other medications prescribed for you. Read the directions carefully, and ask your doctor or other care provider to review them with you. CONTINUE these medications which have CHANGED Dose & Instructions Dispensing Information Comments Morning Noon Evening Bedtime  
 cyclobenzaprine 10 mg tablet Commonly known as:  FLEXERIL What changed:  See the new instructions. Your last dose was: Your next dose is: TAKE 1 TABLET BY MOUTH 3 TIMES A DAY AS NEEDED MUSCLE SPASM Quantity:  90 Tab Refills:  5  
     
   
   
   
  
 ketoconazole 2 % topical cream  
Commonly known as:  NIZORAL What changed:   
- when to take this 
- reasons to take this Your last dose was: Your next dose is:    
   
   
 Apply  to affected area daily. Quantity:  30 g Refills:  0  
     
   
   
   
  
 SYNTHROID 175 mcg tablet Generic drug:  levothyroxine What changed:  Another medication with the same name was removed. Continue taking this medication, and follow the directions you see here. Your last dose was: Your next dose is:    
   
   
 Dose:  175 mcg Take 1 Tab by mouth Daily (before breakfast). Quantity:  1 Tab Refills:  0 Brand name medication medically necessary CONTINUE these medications which have NOT CHANGED Dose & Instructions Dispensing Information Comments Morning Noon Evening Bedtime  
 ascorbic acid (vitamin C) 250 mg tablet Commonly known as:  VITAMIN C Your last dose was: Your next dose is:    
   
   
 Dose:  500 mg Take 500 mg by mouth two (2) times a day. Refills:  0  
     
   
   
   
  
 AYR SALINE topical gel Generic drug:  sodium chloride-aloe vera Your last dose was: Your next dose is:    
   
   
 by Nasal route daily as needed (dry nares). Refills:  0 Benefiber Clear 3 gram/3.5 gram Powd Generic drug:  wheat dextrin Your last dose was: Your next dose is: Take  by mouth daily. 2 Teaspoons daily AT 7AM AND 5PM  
 Refills:  0  
     
   
   
   
  
 buPROPion  mg SR tablet Commonly known as:  Leon Presumjordy Your last dose was: Your next dose is: TAKE 1 TABLET BY MOUTH EVERY DAY Quantity:  30 Tab Refills:  5  
     
   
   
   
  
 busPIRone 5 mg tablet Commonly known as:  BUSPAR Your last dose was: Your next dose is:    
   
   
 Dose:  10 mg Take 10 mg by mouth as needed. Refills:  0  
     
   
   
   
  
 butalbital-acetaminophen-caffeine -40 mg per tablet Commonly known as:  Papo Marie Your last dose was: Your next dose is:    
   
   
 Dose:  1 Tab Take 1 Tab by mouth every six (6) hours as needed for Pain. Max Daily Amount: 4 Tabs. Quantity:  40 Tab Refills:  3  
     
   
   
   
  
 calcium citrate-vitamin d3 315-200 mg-unit Tab Commonly known as:  CITRACAL+D Your last dose was: Your next dose is:    
   
   
 Dose:  2 Tab Take 2 Tabs by mouth two (2) times daily (with meals). Indications: Pt states, \"medication has been change to take at noon and dinner. \"  
 Refills:  0  
     
   
   
   
  
 cetirizine 10 mg tablet Commonly known as:  ZYRTEC Your last dose was: Your next dose is: Take  by mouth nightly. Refills:  0  
     
   
   
   
  
 CULTURELLE PO Your last dose was: Your next dose is:    
   
   
 Dose:  1 Tab Take 1 Tab by mouth daily. Refills:  0  
     
   
   
   
  
 cyanocobalamin 1,000 mcg tablet Your last dose was: Your next dose is:    
   
   
 Dose:  5000 mcg Take 5,000 mcg by mouth daily. Refills:  0  
     
   
   
   
  
 escitalopram oxalate 20 mg tablet Commonly known as:  Radha Edwards Your last dose was: Your next dose is: TAKE 1 TAB BY MOUTH DAILY. Quantity:  30 Tab Refills:  1 FLINTSTONES COMPLETE Chew Generic drug:  pediatric multivitamin no.76 Your last dose was: Your next dose is:    
   
   
 Dose:  1 Tab Take 1 Tab by mouth daily. Refills:  0  
     
   
   
   
  
 FLONASE 50 mcg/actuation nasal spray Generic drug:  fluticasone Your last dose was: Your next dose is:    
   
   
 Dose:  2 Spray 2 Sprays by Both Nostrils route nightly as needed for Rhinitis (as needed). Refills:  0  
     
   
   
   
  
 gabapentin 300 mg capsule Commonly known as:  NEURONTIN Your last dose was: Your next dose is: TAKE 1 CAPSULES BY MOUTH IN THE MORNING,, 1 AT NOON, 2 CAPS AT BEDTIME Quantity:  120 Cap Refills:  4 hyoscyamine SL 0.125 mg SL tablet Commonly known as:  LEVSIN/SL Your last dose was: Your next dose is:    
   
   
 Dose:  0.125 mg  
1 Tab by SubLINGual route every four (4) hours as needed for Cramping. Quantity:  30 Tab Refills:  1  
     
   
   
   
  
 lansoprazole 30 mg capsule Commonly known as:  PREVACID Your last dose was: Your next dose is: TAKE 1 CAP BY MOUTH DAILY. Quantity:  90 Cap Refills:  1  
     
   
   
   
  
 magnesium 250 mg Tab Your last dose was: Your next dose is:    
   
   
 Dose:  500 mg Take 500 mg by mouth daily. Refills:  0 MIRALAX 17 gram packet Generic drug:  polyethylene glycol Your last dose was: Your next dose is:    
   
   
 Dose:  17 g Take 17 g by mouth daily. Refills:  0  
     
   
   
   
  
 ondansetron 4 mg disintegrating tablet Commonly known as:  ZOFRAN ODT Your last dose was: Your next dose is: TAKE 1 TABLET BY MOUTH EVERY 8 HOURS AS NEEDED FOR NAUSEA Quantity:  30 Tab Refills:  0  
     
   
   
   
  
 OTHER Your last dose was: Your next dose is:    
   
   
 C-PAP Machine Refills:  0  
     
   
   
   
  
 SYSTANE (PROPYLENE GLYCOL) 0.4-0.3 % Drop Generic drug:  peg 400-propylene glycol Your last dose was: Your next dose is:    
   
   
 Dose:  1 Drop Administer 1 Drop to both eyes as needed. Refills:  0  
     
   
   
   
  
 TYLENOL EXTRA STRENGTH 500 mg tablet Generic drug:  acetaminophen Your last dose was: Your next dose is:    
   
   
 Dose:  1000 mg Take 1,000 mg by mouth every six (6) hours as needed for Pain. Refills:  0  
     
   
   
   
  
 VITAMIN D3 5,000 unit Tab tablet Generic drug:  cholecalciferol (VITAMIN D3) Your last dose was: Your next dose is:    
   
   
 Dose:  1000 Units Take 1,000 Units by mouth two (2) times a day. LIQUID SOFT GEL Refills:  0 Where to Get Your Medications Information on where to get these meds will be given to you by the nurse or doctor. ! Ask your nurse or doctor about these medications HYDROcodone-acetaminophen 5-325 mg per tablet HYDROcodone-acetaminophen 5-325 mg per tablet Discharge Instructions Patient Discharge Instructions Eliane Resendiz / [id number] : 1970 Admitted 2017 Discharged: 2017 PATIENT INSTRUCTIONS 
GALLBLADDER SURGERY 
(CHOLECYSTECTOMY) FOLLOW-UP:  Please make an appointment with your physician in 10 - 14 day(s). Call your physician immediately if you have any fevers greater than 101.5, drainage from your wound that is not clear or looks infected, persistent bleeding, increasing abdominal pain, problems urinating, or persistent nausea/vomiting. You should be aware that you may have right shoulder pain after surgery and that this will progressively go away. This is called 'referred pain' and is from the area of the gallbladder. It can also be caused by gas that may be trapped under the diaphragm from the surgery, especially if it was performed laparoscopically through mini-incisions. This gas will progressively get reabsorbed by your body. WOUND CARE INSTRUCTIONS:   You may shower at home. If clothing rubs against the wound or causes irritation and the wound is not draining you may cover it with a dry dressing during the daytime. Try to keep the wound dry and avoid ointments on the wound unless directed to do so. If the wound becomes bright red and painful or starts to drain infected material that is not clear, please contact your physician immediately. You should also call if you begin to drain fluid that is thin and greenish-brown from the wound and appears to look like bile.   If the wound though is mildly pink and has a thick firm ridge underneath it, this is normal, and is referred to as a healing ridge. This will resolve over the next 4-6 weeks. Place an ice pack on the navel incision for the next 48 hours. After that, you may use a heating pad if you feel muscle tightening or pulling. DIET:  You may eat any foods that you can tolerate. It is a good idea to eat a high fiber diet and take in plenty of fluids to prevent constipation. If you do become constipated you may want to take a mild laxative or take ducolax tablets on a daily basis until your bowel habits are regular. Constipation can be very uncomfortable, along with straining, after recent abdominal surgery. ACTIVITY:  You are encouraged to cough and deep breath or use your incentive spirometer if you were given one, every 15-30 minutes when awake. This will help prevent respiratory complications and low grade fevers post-operatively. You may want to hug a pillow when coughing and sneezing to add additional support to the surgical area(s) which will decrease pain during these times. You are encouraged to walk and engage in light activity for the next two weeks. You should not lift more than 20 pounds during this time frame as it could put you at increased risk for a post-operative hernia. Twenty pounds is roughly equivalent to a plastic bag of groceries. · Most people are able to return to work within 1 to 2 weeks after surgery. · You may shower 24 hours after surgery. Pat the cut (incision) dry. Do not take a bath for the first week. · Your doctor will tell you when you can have sex again. MEDICATIONS:  Try to take narcotic medications and anti-inflammatory medications, such as tylenol, ibuprofen, naprosyn, etc., with food. This will minimize stomach upset from the medication.   Should you develop nausea and vomiting from the pain medication, or develop a rash, please discontinue the medication and contact your physician. You should not drive, make important decisions, or operate machinery when taking narcotic pain medication. Take {Nsaid list:08140} as scheduled (do not wait for pain) then combine with {Narcotic list:63579} as directed for severe pain. QUESTIONS:  Please feel free to call Dr. Ro Gay office ([phone]) if you have any questions, and they will be glad to assist you. Follow-up with Dr. Ursula Griffith {follow QA:54658}. Call the office to schedule your appointment. Information obtained by : 
 
I understand that if any problems occur once I am at home I am to contact my physician. I understand and acknowledge receipt of the instructions indicated above. Physician's or R.N.'s Signature                                                                  Date/Time Patient or Representative Signature                                                          Date/Time 
 
______________________________________________________________________ Anesthesia Discharge Instructions After general anesthesia or intervenous sedation, for 24 hours or while taking prescription Narcotics: · Limit your activities · Do not drive or operate hazardous machinery · If you have not urinated within 8 hours after discharge, please contact your surgeon on call. · Do not make important personal or business decisions · Do not drink alcoholic beverages Report the following to your surgeon: 
· Excessive pain, swelling, redness or odor of or around the surgical area · Temperature over 100.5 degrees · Nausea and vomiting lasting longer than 4 hours or if unable to take medication · Any signs of decreased circulation or nerve impairment to extremity:  Change in color, persistent numbness, tingling, coldness or increased pain. · Any questions Discharge Orders None Introducing [facility]! Dear Emmett Acuna: 
Thank you for requesting a [facility] account. Our records indicate that you already have an active [facility] account. You can access your account anytime at [email or web address] Did you know that you can access your hospital and ER discharge instructions at any time in [facility]? You can also review all of your test results from your hospital stay or ER visit. Additional Information If you have questions, please visit the Frequently Asked Questions section of the [facility] website at [email or web address] Remember, [facility] is NOT to be used for urgent needs. For medical emergencies, dial 911. Now available from your iPhone and Android! General Information Please provide this summary of care documentation to your next provider. Patient Signature:  ____________________________________________________________ Date:  ____________________________________________________________  
  
[facility] Provider Signature:  ____________________________________________________________ Date:  ____________________________________________________________

## 2017-04-20 NOTE — H&P (VIEW-ONLY)
Office Visit    Subjective:     Deborah Palacios is a 51 y.o. female 9 months post laparoscopic gastric bypass surgery on 7/11/16, she has lost 100 lbs since then. She complains of right upper quadrant pain, that is typically associated with fatty foods. Pain onset's with food's such as a protein bar, and apple with peanut butter. She has had nausea but no vomiting after eating tilapia, seasoned with olive oil, last night that was so bad she could not finish her dinner. The patient has has had lighter colored stool but not had jaundice, acholic stools or dark urine and has not had a history of pancreatitis or hepatitis. On an unrelated note, patient also comes in with complaints of epigastric hernia, no very symptomatic at this time.  She would like to hold off on that until [address] or August.     Patient Active Problem List    Diagnosis Date Noted    History of weight loss surgery 07/28/2016    Hypothyroidism due to acquired atrophy of thyroid 06/02/2016    Fibromyalgia 11/11/2013    Anxiety 03/04/2011    Depression 03/04/2011     Past Medical History:   Diagnosis Date    Arthritis     BMI 45.0-49.9, adult ([address]     Chronic pain     fibromyalgia    Depression     and anxiety    Diabetes ([address]     Elevated lipids     Fibromyalgia     GERD (gastroesophageal reflux disease) 2013    H/O seasonal allergies     Headache     Hypercholesterolemia     Hypertension     Migraines     Morbid obesity ([address]     LINO on CPAP     Thyroid disease       Past Surgical History:   Procedure Laterality Date    HX GYN      ECTOPIC PREGNANCE LEFT TUBE REMOVED    HX HYSTERECTOMY  2011    HX LAP GASTRIC BYPASS  7/11/16    by Dr Son Blas    right ovarian tumor    HX TUBAL LIGATION        Social History   Substance Use Topics    Smoking status: Former Smoker     Packs/day: 1.00     Years: 20.00     Types: Cigarettes     Quit date: 3/4/2005    Smokeless tobacco: Never Used    Alcohol use 0.0 oz/week     0 Standard drinks or equivalent per week      Comment: holidays - wine/rare      Family History   Problem Relation Age of Onset    Diabetes Mother     Hypertension Mother     Crohn's Disease Mother     Asthma Mother     Lung Disease Mother     Migraines Mother     Cancer Mother      SKIN    Depression Mother     Other Mother      FIBROMYALGIA    Gout Mother     Cancer Father      oral    Diabetes Father      steroid induced    Alcohol abuse Sister     Heart Disease Sister     Migraines Sister     Depression Sister      BIPOLAR DISORDER    Anesth Problems Daughter      N&V    Asthma Daughter     Cancer Paternal Grandmother      colon      Current Outpatient Prescriptions   Medication Sig    polyethylene glycol (MIRALAX) 17 gram packet Take 17 g by mouth daily.  buPROPion SR (WELLBUTRIN SR) 100 mg SR tablet TAKE 1 TABLET BY MOUTH EVERY DAY    lansoprazole (PREVACID) 30 mg capsule TAKE 1 CAP BY MOUTH DAILY.  butalbital-acetaminophen-caffeine (FIORICET, ESGIC) -40 mg per tablet Take 1 Tab by mouth every six (6) hours as needed for Pain. Max Daily Amount: 4 Tabs.  cyclobenzaprine (FLEXERIL) 10 mg tablet TAKE 1 TABLET BY MOUTH 3 TIMES A DAY AS NEEDED MUSCLE SPASM    SYNTHROID 175 mcg tablet Take 1 Tab by mouth Daily (before breakfast).  escitalopram oxalate (LEXAPRO) 20 mg tablet Take 1 Tab by mouth daily.  ondansetron (ZOFRAN ODT) 4 mg disintegrating tablet Take 1 Tab by mouth every eight (8) hours as needed for Nausea.  gabapentin (NEURONTIN) 300 mg capsule TAKE 1 CAPSULES BY MOUTH IN THE MORNING,, 1 AT NOON, 2 CAPS AT BEDTIME    hyoscyamine SL (LEVSIN/SL) 0.125 mg SL tablet 1 Tab by SubLINGual route every four (4) hours as needed for Cramping.  busPIRone (BUSPAR) 5 mg tablet Take 5 mg by mouth three (3) times daily as needed.  ascorbic acid, vitamin C, (VITAMIN C) 250 mg tablet Take 250 mg by mouth two (2) times a day.     LACTOBACILLUS RHAMNOSUS GG (CULTURELLE PO) Take  by mouth.  therapeutic multivitamin (THERAGRAN) tablet Take 1 Tab by mouth two (2) times a day.  magnesium 250 mg tab Take 250 mg by mouth.  calcium citrate-vitamin d3 (CITRACAL+D) 315-200 mg-unit tab Take 2 Tabs by mouth two (2) times daily (with meals). Indications: Pt states, \"medication has been change to take at noon and dinner. \"    cetirizine (ZYRTEC) 10 mg tablet Take  by mouth nightly.  wheat dextrin (BENEFIBER CLEAR) 3 gram/3.5 gram powd Take  by mouth daily. 2 Teaspoons daily AT 7AM AND 5PM    cholecalciferol, VITAMIN D3, (VITAMIN D3) 5,000 unit tab tablet Take 1,000 Units by mouth two (2) times a day. LIQUID SOFT GEL    fluticasone (FLONASE) 50 mcg/actuation nasal spray 2 Sprays by Both Nostrils route nightly as needed for Rhinitis (as needed).  peg 400-propylene glycol (SYSTANE) 0.4-0.3 % drop Administer 1 Drop to both eyes as needed.  sodium chloride-aloe vera (AYR SALINE) topical gel by Nasal route daily as needed (dry nares).  OTHER C-PAP Machine    ketoconazole (NIZORAL) 2 % topical cream Apply  to affected area daily. (Patient taking differently: Apply  to affected area as needed.)     No current facility-administered medications for this visit.        Allergies   Allergen Reactions    Sulfa (Sulfonamide Antibiotics) Anaphylaxis and Other (comments)     Difficulty breathing/high fever/delusional      Aleve [Naproxen Sodium] Nausea Only    Codeine Nausea Only    Ibuprofen Hives    Lactose Other (comments)     Gas     Penicillin G Hives        Review of Systems:  A comprehensive review of 12 systems was not done       Objective:     Visit Vitals    /80    Pulse 73    Temp 97.5 °F (36.4 °C) (Oral)    Resp 18    Ht 5' 3\" (1.6 m)    Wt 168 lb 8 oz (76.4 kg)    LMP 01/29/2011    SpO2 98%    BMI 29.85 kg/m2        Physical Exam:    General:  alert, cooperative, no distress, appears stated age   Eyes:  conjunctivae and sclerae normal, EOMs intact, PERRL   Throat & Neck: normal, no erythema or exudates noted. , no erythema or exudates noted and neck supple and symmetrical; no palpable masses   Lungs:   clear to auscultation bilaterally   Heart:  Regular rate and rhythm   Abdomen:   Rounded, soft, Tenderness to palpation RUQ, no masses, no rebound or guarding    Two incisional hernias at the midline below the umbilicus:  scar with two incisional hernias, one at each end of the scar both reducible and non-tender. Skin: Normal.   Neuro: Mental status: Alert, oriented, thought content appropriate  Gait: Normal   Lymphatic: no adenopathy - inguinal, cerv, supraclav       Assessment:     Encounter Diagnoses     ICD-10-CM ICD-9-CM   1. Colicky RUQ abdominal pain R10.11 789.01   2. Ventral incisional hernia without obstruction or gangrene K43.2 553.21   3. Hx of gastric bypass Z98.890 V45.86      Patient has significant symptoms and wishes to proceed with surgical intervention. Her symptoms are suspicious for cholelithiasis with biliary colic. Recommendation:     1. US ABD to confirm cholelithiasis with biliary colic. I will call her with those results and if appropriate schedule for surgery at the patient's convenience. 2. If the US results are positive we will proceed with laparoscopic cholecystectomy with cholangiography. I explained the indications for laparoscopic cholecystectomy as well as the alternatives. I discussed the potential risks, including but not limited to bleeding, wound infection, trocar injuries and also the possible need for conversion to open procedure. She indicates that she understands the risks, accepts and wishes to proceed. 3. A patient education booklet on gallbladder was given to the patient. Signed By: Radu Joiner MD    April 11, 2017       Total time spent with patient, greater than 50% of the time was spent in counseling: 10 minutes.  1:17 PM - 1:27 PM.    Chart was written by Kayode Sosa, as dictated by Dr. Raul Lawson MD.

## 2017-04-20 NOTE — ANESTHESIA POSTPROCEDURE EVALUATION
Post-Anesthesia Evaluation and Assessment    Patient: Diana Gandara MRN: [id number]  SSN: [id number]    YOB: 1970  Age: 51 y.o. Sex: female       Cardiovascular Function/Vital Signs  Visit Vitals    /68    Pulse 61    Temp 36.7 °C (98 °F)    Resp 9    Ht 5' 3\" (1.6 m)    Wt 76.7 kg (169 lb)    SpO2 (!) 89%    BMI 29.94 kg/m2       Patient is status post general anesthesia for Procedure(s):  LAPAROSCOPIC CHOLECYSTECTOMY WITH GRAM .    Nausea/Vomiting: None    Postoperative hydration reviewed and adequate. Pain:  Pain Scale 1: Numeric (0 - 10) (04/20/17 1411)  Pain Intensity 1: 9 (04/20/17 1411)   Managed    Neurological Status:   Neuro (WDL): Exceptions to WDL (04/20/17 1411)  Neuro  Neurologic State: Drowsy (04/20/17 1411)  LUE Motor Response: Purposeful (04/20/17 1411)  LLE Motor Response: Purposeful (04/20/17 1411)  RUE Motor Response: Purposeful (04/20/17 1411)  RLE Motor Response: Purposeful (04/20/17 1411)   At baseline    Mental Status and Level of Consciousness: Arousable    Pulmonary Status:   O2 Device: Room air (04/20/17 1411)   Adequate oxygenation and airway patent    Complications related to anesthesia: None    Post-anesthesia assessment completed.  No concerns    Signed By: Gretel Singletary MD     April 20, 2017

## 2017-04-20 NOTE — OP NOTES
Operative Report    Date of Surgery: 4/20/2017     Preoperative Diagnosis: Biliary colic    Postoperative Diagnosis: Biliary colic    Surgeon(s) and Role:     Velvet Mariee MD - Primary      Assistant:  Mikala    Anesthesia: General    Procedure: Procedure(s):  LAPAROSCOPIC CHOLECYSTECTOMY WITH GRAM      Findings: gallbladder with sludge, no stones, Chronic inflammatory changes, cholangiogram--within normal limits    Estimated Blood Loss: 5 mL IV:  mL           Drains: none       Specimens:   ID Type Source Tests Collected by Time Destination   1 : Gallbladder Fresh Gallbladder  Kellie Harris MD 4/20/2017 1329 Pathology                Complications:  None; patient tolerated the procedure well. Wound prophylaxis: Ancef given IV by anesthetist prior to incision    VTE prophylaxis: SCDs fitted and started prior to induction of anesthesia    Indications: Pt with history of right upper quadrant pain, pain radiating to the back and nausea and/or vomiting after meals; ultrasound--sludge in GB. Here for elective laparoscopic cholecystectomy. Because she has had Mathieu en Y gastric bypass will perform laparoscopic cholangiography to assess ducts. Procedure in Detail:  The patient was seen in the Holding Area. After obtaining informed consent the patient was taken to the operating room, identified as Vishnu Ramírez, and the procedure verified. A Time Out was held and the above information confirmed. The patient was placed supine position. After establishing general anesthesia, the abdomen was prepped and draped in standard fashion. Local anesthetic was  administered to the dermis and the fascia at all the port sites. All of the formed sites from [address] [place] [address] 1690 en Y gastric bypass were used. An incision was made in the left mid clavicular line then a Veress needle was introduced. Placement was confirmed with a saline drop test. Insufflation was applied to achieve pneumoperitoneum of 15 mmHg.   A 5-mm port was placed with the camera through the port. Placement was observed directly and no injury was seen to the underlying viscera. Insufflation was applied to achieve pneumoperitoneum of 15 mmHg. The pneumoperitoneum was maintained and the remaining ports were placed under direct vision with the 12-mm port at the midline incision above the umbilicus. The patient was then positioned in reverse Trendelenburg. The gallbladder was identified; the fundus was grasped and retracted cephalad. The infundibulum was grasped and retracted laterally, exposing the peritoneum overlying the triangle of Calot. This was then divided and exposed in a blunt fashion. The cystic duct was clearly identified and bluntly dissected circumferentially. As this was being dissection, the cystic artery was avulsed. This was controlled with Endo Clip. The junction of the gallbladder and cystic duct was clearly identified then ligated with a Clip. An incision was made in the cystic duct and the cholangiogram catheter introduced. The catheter was secured with a Clip. Cholangiography was performed under fluoroscopy. The study showed no stones and good visualization of the distal and proximal biliary tree with no filling defects or obstruction. The dye flowed in the duodenum through a tapered ampulla. The catheter was then removed. The cystic duct was then ligated with two Clips distally and divided. The gallbladder was dissected from the liver bed in retrograde fashion with the electrocautery. The liver bed was inspected. Clips were confirmed intact on the cystic duct and artery. Hemostasis was achieved with electrocautery. The gallbladder was removed through the umbilical port using a specimen pouch. The midline port fascia was then closed with Vicryl suture placed with the Endo Close needle. Pneumoperitoneum was completely reduced then the ports were removed. ; the skin was then closed with Monocryl subcuticular and Dermabond. Instrument, sponge, and needle counts were correct at closure and at the conclusion of the case. The patient was extubated and taken to recovery room in good condition having tolerated the procedure well. Disposition: PACU - hemodynamically stable.            Condition: stable    Signed By: Donna Bullock MD     April 20, 2017

## 2017-04-20 NOTE — ANESTHESIA PREPROCEDURE EVALUATION
Anesthetic History   No history of anesthetic complications            Review of Systems / Medical History  Patient summary reviewed, nursing notes reviewed and pertinent labs reviewed    Pulmonary        Sleep apnea           Neuro/Psych   Within defined limits           Cardiovascular    Hypertension                   GI/Hepatic/Renal     GERD           Endo/Other    Diabetes  Hypothyroidism  Morbid obesity and arthritis     Other Findings              Physical Exam    Airway  Mallampati: II  TM Distance: > 6 cm  Neck ROM: normal range of motion   Mouth opening: Normal     Cardiovascular  Regular rate and rhythm,  S1 and S2 normal,  no murmur, click, rub, or gallop             Dental  No notable dental hx       Pulmonary  Breath sounds clear to auscultation               Abdominal  GI exam deferred       Other Findings            Anesthetic Plan    ASA: 3  Anesthesia type: general          Induction: Intravenous  Anesthetic plan and risks discussed with: Patient

## 2017-04-20 NOTE — DISCHARGE INSTRUCTIONS
Patient Discharge Instructions    Kee Corey / [id number] : 1970    Admitted 2017 Discharged: 2017       PATIENT INSTRUCTIONS  GALLBLADDER SURGERY  (CHOLECYSTECTOMY)    FOLLOW-UP:  Please make an appointment with your physician in 10 - 14 day(s). Call your physician immediately if you have any fevers greater than 101.5, drainage from your wound that is not clear or looks infected, persistent bleeding, increasing abdominal pain, problems urinating, or persistent nausea/vomiting. You should be aware that you may have right shoulder pain after surgery and that this will progressively go away. This is called 'referred pain' and is from the area of the gallbladder. It can also be caused by gas that may be trapped under the diaphragm from the surgery, especially if it was performed laparoscopically through mini-incisions. This gas will progressively get reabsorbed by your body. WOUND CARE INSTRUCTIONS:   You may shower at home. If clothing rubs against the wound or causes irritation and the wound is not draining you may cover it with a dry dressing during the daytime. Try to keep the wound dry and avoid ointments on the wound unless directed to do so. If the wound becomes bright red and painful or starts to drain infected material that is not clear, please contact your physician immediately. You should also call if you begin to drain fluid that is thin and greenish-brown from the wound and appears to look like bile. If the wound though is mildly pink and has a thick firm ridge underneath it, this is normal, and is referred to as a healing ridge. This will resolve over the next 4-6 weeks. Place an ice pack on the navel incision for the next 48 hours. After that, you may use a heating pad if you feel muscle tightening or pulling. DIET:  You may eat any foods that you can tolerate. It is a good idea to eat a high fiber diet and take in plenty of fluids to prevent constipation.   If you do become constipated you may want to take a mild laxative or take ducolax tablets on a daily basis until your bowel habits are regular. Constipation can be very uncomfortable, along with straining, after recent abdominal surgery. ACTIVITY:  You are encouraged to cough and deep breath or use your incentive spirometer if you were given one, every 15-30 minutes when awake. This will help prevent respiratory complications and low grade fevers post-operatively. You may want to hug a pillow when coughing and sneezing to add additional support to the surgical area(s) which will decrease pain during these times. You are encouraged to walk and engage in light activity for the next two weeks. You should not lift more than 20 pounds during this time frame as it could put you at increased risk for a post-operative hernia. Twenty pounds is roughly equivalent to a plastic bag of groceries. · Most people are able to return to work within 1 to 2 weeks after surgery. · You may shower 24 hours after surgery. Pat the cut (incision) dry. Do not take a bath for the first week. · Your doctor will tell you when you can have sex again. MEDICATIONS:  Try to take narcotic medications and anti-inflammatory medications, such as tylenol, ibuprofen, naprosyn, etc., with food. This will minimize stomach upset from the medication. Should you develop nausea and vomiting from the pain medication, or develop a rash, please discontinue the medication and contact your physician. You should not drive, make important decisions, or operate machinery when taking narcotic pain medication. Take {Nsaid list:97912} as scheduled (do not wait for pain) then combine with {Narcotic list:94412} as directed for severe pain. QUESTIONS:  Please feel free to call Dr. Ro Gay office ([phone]) if you have any questions, and they will be glad to assist you. Follow-up with Dr. Ursula Griffith {follow QD:[id number]}.  Call the office to schedule your appointment. Information obtained by :    I understand that if any problems occur once I am at home I am to contact my physician. I understand and acknowledge receipt of the instructions indicated above. Physician's or R.N.'s Signature                                                                  Date/Time                                                                                                                                              Patient or Representative Signature                                                          Date/Time    ______________________________________________________________________    Anesthesia Discharge Instructions    After general anesthesia or intervenous sedation, for 24 hours or while taking prescription Narcotics:  · Limit your activities  · Do not drive or operate hazardous machinery  · If you have not urinated within 8 hours after discharge, please contact your surgeon on call. · Do not make important personal or business decisions  · Do not drink alcoholic beverages    Report the following to your surgeon:  · Excessive pain, swelling, redness or odor of or around the surgical area  · Temperature over 100.5 degrees  · Nausea and vomiting lasting longer than 4 hours or if unable to take medication  · Any signs of decreased circulation or nerve impairment to extremity:  Change in color, persistent numbness, tingling, coldness or increased pain.   · Any questions

## 2017-04-21 ENCOUNTER — TELEPHONE (OUTPATIENT)
Dept: SURGERY | Age: 47
End: 2017-04-21

## 2017-04-21 NOTE — TELEPHONE ENCOUNTER
Spoke with patient who states she is having  horrible pain. She did not sleep at all last night. Per Dr. Pemberton  Take 2 pills tablets every 4 hours. Once pain medication is in system. Then decrease to 1 tablet every 6 hours. Patient in ageeement.

## 2017-04-25 ENCOUNTER — TELEPHONE (OUTPATIENT)
Dept: SURGERY | Age: 47
End: 2017-04-25

## 2017-04-25 DIAGNOSIS — R52 PAIN: Primary | ICD-10-CM

## 2017-04-25 RX ORDER — TRAMADOL HYDROCHLORIDE 50 MG/1
50 TABLET ORAL
Qty: 24 TAB | Refills: 0 | OUTPATIENT
Start: 2017-04-25 | End: 2017-05-10

## 2017-04-25 NOTE — TELEPHONE ENCOUNTER
Spoke with patient regarding pain medication refill. Per Lissette Britton NP. Patient may have Tramadol 50 mg po # 24 / 0 refill. Take i every 8 hours prn pain.medication phone into pharmacy.

## 2017-05-04 ENCOUNTER — OFFICE VISIT (OUTPATIENT)
Dept: SURGERY | Age: 47
End: 2017-05-04

## 2017-05-04 VITALS
SYSTOLIC BLOOD PRESSURE: 110 MMHG | DIASTOLIC BLOOD PRESSURE: 64 MMHG | BODY MASS INDEX: 29.32 KG/M2 | HEART RATE: 71 BPM | HEIGHT: 63 IN | WEIGHT: 165.5 LBS | TEMPERATURE: 97.8 F | RESPIRATION RATE: 16 BRPM | OXYGEN SATURATION: 98 %

## 2017-05-04 DIAGNOSIS — Z09 FOLLOW-UP EXAMINATION FOLLOWING SURGERY: Primary | ICD-10-CM

## 2017-05-04 RX ORDER — FAMOTIDINE 40 MG/1
40 TABLET, FILM COATED ORAL 2 TIMES DAILY
Qty: 60 TAB | Refills: 5 | Status: SHIPPED | OUTPATIENT
Start: 2017-05-04 | End: 2017-11-01 | Stop reason: SDUPTHER

## 2017-05-04 NOTE — PATIENT INSTRUCTIONS
Stop the Prevacid and switch to famotidine (Pepcid) 40 mg 2x/ daily as needed     Continue your vitamins daily     Activity:  You may swim in a pool. Continue to avoid heavy lifting, pushing or pulling > 20 lbs. No abdominal exercises for another 2 weeks.

## 2017-05-04 NOTE — MR AVS SNAPSHOT
Visit Information Date & Time Provider Department Dept. Phone Encounter #  
 5/4/2017  8:40 AM Larissa Coyne NP [facility] GENERAL SUR[facility]ERY [address] [phone] [id number] Your Appointments 5/10/2017  7:45 AM  
ROUTINE CARE with Ramone Will MD  
[facility]) Appt Note: 6 mnth fu thyroid; 6 mnth fu thyroid/r/s due to PCP unavail [address] [place] [address]  
  
   
 [phone]  
  
    
 7/21/2017 10:00 AM  
ESTABLISHED PATIENT with Allen Ross NP  
[facility] [phone] ([facility]) Appt Note: EP 3 month  7/11/16 LAP GASTRIC BYPASS W EGD--EB; $0CP $0PB  
 [address] [facility] [address]  
[address] [phone]  
  
    
 7/21/2017 10:20 AM  
ESTABLISHED PATIENT with MD Juni Ayala [phone] ([facility]) Appt Note: EP discuss poss hernia surgery $0Cp $0PB  
 [address] [facility] [address]  
[address] [place] [address] Upcoming Health Maintenance Date Due  
 FOOT EXAM Q1 5/31/1979 MICROALBUMIN Q1 10/13/2016 EYE EXAM RETINAL OR DILATED Q1 2/8/2017 HEMOGLOBIN A1C Q6M 5/2/2017 LIPID PANEL Q1 6/27/2017 INFLUENZA AGE 9 TO ADULT 8/1/2017 DTaP/Tdap/Td series (2 - Td) 11/12/2023 Allergies as of 5/4/2017  Review Complete On: 4/20/2017 By: Vani Simons RN Severity Noted Reaction Type Reactions Sulfa (Sulfonamide Antibiotics) High 03/04/2011   Side Effect Anaphylaxis, Other (comments) Difficulty breathing/high fever/delusional    
 Aleve [Naproxen Sodium]  09/30/2013    Nausea Only Codeine  03/04/2011   Side Effect Nausea Only Ibuprofen  03/04/2011   Side Effect Hives Lactose  02/23/2016   Intolerance Other (comments) Gas Penicillin G  03/04/2011   Side Effect Hives Current Immunizations  Reviewed on 7/11/2016 Name Date Influenza Vaccine 11/12/2013 Influenza Vaccine (Quad) PF 10/13/2015 Influenza Vaccine (Whole Virus) 3/1/2013 Influenza Vaccine PF 10/23/2014 Influenza Vaccine Split 11/2/2011 Pneumococcal Polysaccharide (PPSV-23) 3/11/2015 Tdap 11/12/2013 Not reviewed this visit Vitals BP Pulse Temp Resp Height(growth percentile) Weight(growth percentile) 110/64 71 97.8 °F (36.6 °C) 16 5' 3\" (1.6 m) 165 lb 8 oz (75.1 kg) LMP SpO2 BMI OB Status Smoking Status 01/29/2011 98% 29.32 kg/m2 Hysterectomy Former Smoker Vitals History BMI and BSA Data Body Mass Index Body Surface Area  
 29.32 kg/m 2 1.83 m 2 Preferred Pharmacy Pharmacy Name Phone [id number] [place], [address] [phone] Your Updated Medication List  
  
   
This list is accurate as of: 5/4/17  9:13 AM.  Always use your most recent med list.  
  
  
  
  
 ascorbic acid (vitamin C) 250 mg tablet Commonly known as:  VITAMIN C Take 500 mg by mouth two (2) times a day. AYR SALINE topical gel Generic drug:  sodium chloride-aloe vera  
by Nasal route daily as needed (dry nares). Benefiber Clear 3 gram/3.5 gram Powd Generic drug:  wheat dextrin Take  by mouth daily. 2 Teaspoons daily AT 7AM AND 5PM  
  
 buPROPion  mg SR tablet Commonly known as:  WELLBUTRIN SR  
TAKE 1 TABLET BY MOUTH EVERY DAY  
  
 busPIRone 5 mg tablet Commonly known as:  BUSPAR Take 10 mg by mouth as needed. butalbital-acetaminophen-caffeine -40 mg per tablet Commonly known as:  Pal [place] Take 1 Tab by mouth every six (6) hours as needed for Pain. Max Daily Amount: 4 Tabs. calcium citrate-vitamin d3 315-200 mg-unit Tab Commonly known as:  CITRACAL+D Take 2 Tabs by mouth two (2) times daily (with meals). Indications: Pt states, \"medication has been change to take at noon and dinner. \"  
  
 cetirizine 10 mg tablet Commonly known as:  ZYRTEC Take  by mouth nightly. CULTURELLE PO Take 1 Tab by mouth daily. cyanocobalamin 1,000 mcg tablet Take 5,000 mcg by mouth daily. cyclobenzaprine 10 mg tablet Commonly known as:  FLEXERIL  
TAKE 1 TABLET BY MOUTH 3 TIMES A DAY AS NEEDED MUSCLE SPASM * escitalopram oxalate 20 mg tablet Commonly known as:  Ranell Cherry TAKE 1 TAB BY MOUTH DAILY. * escitalopram oxalate 20 mg tablet Commonly known as:  Ranell Cherry TAKE 1 TAB BY MOUTH DAILY. famotidine 40 mg tablet Commonly known as:  PEPCID Take 1 Tab by mouth two (2) times a day. Indications: gastroesophageal reflux disease FLINTSTONES COMPLETE Chew Generic drug:  pediatric multivitamin no.76 Take 1 Tab by mouth daily. FLONASE 50 mcg/actuation nasal spray Generic drug:  fluticasone 2 Sprays by Both Nostrils route nightly as needed for Rhinitis (as needed). gabapentin 300 mg capsule Commonly known as:  NEURONTIN  
TAKE 1 CAPSULES BY MOUTH IN THE MORNING,, 1 AT NOON, 2 CAPS AT BEDTIME  
  
 hyoscyamine SL 0.125 mg SL tablet Commonly known as:  LEVSIN/SL  
1 Tab by SubLINGual route every four (4) hours as needed for Cramping.  
  
 ketoconazole 2 % topical cream  
Commonly known as:  NIZORAL Apply  to affected area daily. magnesium 250 mg Tab Take 500 mg by mouth daily. MIRALAX 17 gram packet Generic drug:  polyethylene glycol Take 17 g by mouth daily. ondansetron 4 mg disintegrating tablet Commonly known as:  ZOFRAN ODT  
TAKE 1 TABLET BY MOUTH EVERY 8 HOURS AS NEEDED FOR NAUSEA  
  
 OTHER  
C-PAP Machine * SYNTHROID 175 mcg tablet Generic drug:  levothyroxine Take 1 Tab by mouth Daily (before breakfast). * SYNTHROID 175 mcg tablet Generic drug:  levothyroxine TAKE 1 TABLET BY MOUTH EVERY DAY  
  
 SYSTANE (PROPYLENE GLYCOL) 0.4-0.3 % Drop Generic drug:  peg 400-propylene glycol Administer 1 Drop to both eyes as needed. traMADol 50 mg tablet Commonly known as:  ULTRAM  
Take 1 Tab by mouth every eight (8) hours as needed for Pain. Max Daily Amount: 150 mg.  
  
 TYLENOL EXTRA STRENGTH 500 mg tablet Generic drug:  acetaminophen Take 1,000 mg by mouth every six (6) hours as needed for Pain. VITAMIN D3 5,000 unit Tab tablet Generic drug:  cholecalciferol (VITAMIN D3) Take 1,000 Units by mouth two (2) times a day. LIQUID SOFT GEL * Notice: This list has 4 medication(s) that are the same as other medications prescribed for you. Read the directions carefully, and ask your doctor or other care provider to review them with you. Prescriptions Sent to Pharmacy Refills  
 famotidine (PEPCID) 40 mg tablet 5 Sig: Take 1 Tab by mouth two (2) times a day. Indications: gastroesophageal reflux disease Class: Normal  
 Pharmacy: [id number]- [address] #: [phone] Route: Oral  
  
Patient Instructions Stop the Prevacid and switch to famotidine (Pepcid) 40 mg 2x/ daily as needed Continue your vitamins daily Activity:  You may swim in a pool. Continue to avoid heavy lifting, pushing or pulling > 20 lbs. No abdominal exercises for another 2 weeks. Introducing \[facility] [place] [facility]\"" & HEALTH SERVICES! Dear Asim Meng: 
Thank you for requesting a M[employer] account. Our records indicate that you already have an active Mpax account. You can access your account anytime at [email or web address] Did you know that you can access your hospital and ER discharge instructions at any time in Mpax? You can also review all of your test results from your hospital stay or ER visit. Additional Information If you have questions, please visit the Frequently Asked Questions section of the [employer] website at [email or web address] Remember, [employer] is NOT to be used for urgent needs. For medical emergencies, dial 911. Now available from your iPhone and Android! Please provide this summary of care documentation to your next provider. Your primary care clinician is listed as Deloria Reason. If you have any questions after today's visit, please call [phone].

## 2017-05-04 NOTE — PROGRESS NOTES
Chief Complaint   Patient presents with    Surgical Follow-up     2 weeks s/p lap spike by Dr Jose Esparza is a 51 y.o. female presents 2 weeks s/p laparoscopic chol;cystectomy. She is doing well and feels better. No fever or chills, chest pain or shortness of breath. She was initially constipated after surgery, but is back to her regimen of fiber and miralax daily with relief. She is voiding and it is clear. Tolerating Bariatric diet and no nausea or vomiting. She tried stopping the Prevacid per our last office visit and noticed she had increased nausea, so she resumed. Asking about switching to something that will not cause bone loss. Visit Vitals    /64    Pulse 71    Temp 97.8 °F (36.6 °C)    Resp 16    Ht 5' 3\" (1.6 m)    Wt 165 lb 8 oz (75.1 kg)    LMP 01/29/2011    SpO2 98%    BMI 29.32 kg/m2     A + O x 3, looks well   Chest  CTA  COR  RRR  ABD Soft, lap sites are C/D/I and no erythema or induration; tender at umbilicus as appropriate; ND, +BS, hernia to the right of the umbilicus mild tenderness  EXT No edema; ambulating independently      ICD-10-CM ICD-9-CM    1. Follow-up examination following surgery Z09 V67.00      Doing well 2 weeks s/p laparoscopic cholecystectomy   Pathology reviewed   Gallbladder, cholecystectomy:   Chronic cholecystitis with cholelithiasis   One benign lymph node   Resume bariatric diet   Continue vitamins   Continue bowel regimen   Activity no lifting, pushing or pulling; no abdominal exercises x 2 weeks   May swim   Resume regular follow up schedule   Trial off Prevacid and switch to Pepcid 40 mg BID prn   Keshav Solares verbalized understanding and questions were answered to the best of my knowledge and ability. Pepcid  educational materials were provided.

## 2017-05-10 ENCOUNTER — HOSPITAL ENCOUNTER (OUTPATIENT)
Dept: MAMMOGRAPHY | Age: 47
Discharge: HOME | End: 2017-05-10
Payer: COMMERCIAL

## 2017-05-10 ENCOUNTER — OFFICE VISIT (OUTPATIENT)
Dept: INTERNAL MEDICINE CLINIC | Age: 47
End: 2017-05-10

## 2017-05-10 VITALS
DIASTOLIC BLOOD PRESSURE: 72 MMHG | WEIGHT: 168 LBS | SYSTOLIC BLOOD PRESSURE: 114 MMHG | BODY MASS INDEX: 29.77 KG/M2 | OXYGEN SATURATION: 99 % | HEIGHT: 63 IN | HEART RATE: 64 BPM | TEMPERATURE: 97.6 F | RESPIRATION RATE: 17 BRPM

## 2017-05-10 DIAGNOSIS — E03.4 HYPOTHYROIDISM DUE TO ACQUIRED ATROPHY OF THYROID: Primary | ICD-10-CM

## 2017-05-10 DIAGNOSIS — Z12.39 BREAST CANCER SCREENING: ICD-10-CM

## 2017-05-10 PROCEDURE — 77067 SCR MAMMO BI INCL CAD: CPT

## 2017-05-10 NOTE — PROGRESS NOTES
HISTORY OF PRESENT ILLNESS  Deidra Perez is a 51 y.o. female. HPI  Thyroid Disease:  Deidra Perez is a 51 y.o. female here for follow up of hypothyroidism. Lab Results   Component Value Date/Time    TSH 2.550 12/15/2016 11:30 AM     Residual symptoms feeling cold and cold intolerance. she denies fatigue, weight gain, constipation, feeling slow, feeling excessive energy, tremulousness, palpitations and sweating  Thyroid medication has been unchanged since last medication check and labs. She has had 100lb wt loss since her bariatric surgery. Recheck cholecystectomy. No complications. Great exercise routine. Good diet. Patient Active Problem List   Diagnosis Code    Anxiety F41.9    Depression F32.9    Fibromyalgia M79.7    Hypothyroidism due to acquired atrophy of thyroid E03.4    History of weight loss surgery Z98.84     Past Medical History:   Diagnosis Date    Arthritis     Autoimmune disease ([facility]     FIBROMYALGIA    BMI 45.0-49.9, adult ([facility]     Chronic pain     fibromyalgia    Depression     and anxiety    [facility]     HX NIDDM (NO MEDS 4-12-17)    Elevated lipids     Fibromyalgia     GERD (gastroesophageal reflux disease) 2013    H/O seasonal allergies     Headache     Hypercholesterolemia     Hypertension     HX HTN, NO IBWX(1-21-59)    Migraines     Morbid obesity (HCC)     LINO on CPAP     Thyroid disease     HYPOTHYROID     Allergies   Allergen Reactions    Sulfa (Sulfonamide Antibiotics) Anaphylaxis and Other (comments)     Difficulty breathing/high fever/delusional      Aleve [Naproxen Sodium] Nausea Only    Codeine Nausea Only    Ibuprofen Hives    Lactose Other (comments)     Gas     Penicillin G Hives     Current Outpatient Prescriptions on File Prior to Visit   Medication Sig Dispense Refill    famotidine (PEPCID) 40 mg tablet Take 1 Tab by mouth two (2) times a day.  Indications: gastroesophageal reflux disease 60 Tab 5    SYNTHROID 175 mcg tablet TAKE 1 TABLET BY MOUTH EVERY DAY 30 Tab 5    escitalopram oxalate (LEXAPRO) 20 mg tablet TAKE 1 TAB BY MOUTH DAILY. 30 Tab 4    ondansetron (ZOFRAN ODT) 4 mg disintegrating tablet TAKE 1 TABLET BY MOUTH EVERY 8 HOURS AS NEEDED FOR NAUSEA 30 Tab 0    cyanocobalamin 1,000 mcg tablet Take 5,000 mcg by mouth daily.  pediatric multivitamin no.76 (FLINTSTONES COMPLETE) chew Take 1 Tab by mouth daily.  acetaminophen (TYLENOL EXTRA STRENGTH) 500 mg tablet Take 1,000 mg by mouth every six (6) hours as needed for Pain.  polyethylene glycol (MIRALAX) 17 gram packet Take 17 g by mouth daily.  buPROPion SR (WELLBUTRIN SR) 100 mg SR tablet TAKE 1 TABLET BY MOUTH EVERY DAY 30 Tab 5    butalbital-acetaminophen-caffeine (FIORICET, ESGIC) -40 mg per tablet Take 1 Tab by mouth every six (6) hours as needed for Pain. Max Daily Amount: 4 Tabs. 40 Tab 3    cyclobenzaprine (FLEXERIL) 10 mg tablet TAKE 1 TABLET BY MOUTH 3 TIMES A DAY AS NEEDED MUSCLE SPASM (Patient taking differently: TAKE 1 TABLET BY MOUTH 3 TIMES A DAY AS NEEDED MUSCLE SPASM (TAKES EVERY BEDTIME)) 90 Tab 5    gabapentin (NEURONTIN) 300 mg capsule TAKE 1 CAPSULES BY MOUTH IN THE MORNING,, 1 AT NOON, 2 CAPS AT BEDTIME 120 Cap 4    hyoscyamine SL (LEVSIN/SL) 0.125 mg SL tablet 1 Tab by SubLINGual route every four (4) hours as needed for Cramping. 30 Tab 1    busPIRone (BUSPAR) 5 mg tablet Take 10 mg by mouth as needed. 0    ascorbic acid, vitamin C, (VITAMIN C) 250 mg tablet Take 500 mg by mouth two (2) times a day.  LACTOBACILLUS RHAMNOSUS GG (CULTURELLE PO) Take 1 Tab by mouth daily.  magnesium 250 mg tab Take 500 mg by mouth daily.  calcium citrate-vitamin d3 (CITRACAL+D) 315-200 mg-unit tab Take 2 Tabs by mouth two (2) times daily (with meals). Indications: Pt states, \"medication has been change to take at noon and dinner. \"      cetirizine (ZYRTEC) 10 mg tablet Take  by mouth nightly.       wheat dextrin (BENEFIBER CLEAR) 3 gram/3.5 gram powd Take  by mouth daily. 2 Teaspoons daily AT 7AM AND 5PM      cholecalciferol, VITAMIN D3, (VITAMIN D3) 5,000 unit tab tablet Take 1,000 Units by mouth two (2) times a day. LIQUID SOFT GEL      ketoconazole (NIZORAL) 2 % topical cream Apply  to affected area daily. (Patient taking differently: Apply  to affected area as needed.) 30 g 0    fluticasone (FLONASE) 50 mcg/actuation nasal spray 2 Sprays by Both Nostrils route nightly as needed for Rhinitis (as needed).  peg 400-propylene glycol (SYSTANE) 0.4-0.3 % drop Administer 1 Drop to both eyes as needed.  OTHER C-PAP Machine       No current facility-administered medications on file prior to visit. Social History   Substance Use Topics    Smoking status: Former Smoker     Packs/day: 1.00     Years: 20.00     Types: Cigarettes     Quit date: 3/4/2005    Smokeless tobacco: Never Used    Alcohol use 0.0 oz/week     0 Standard drinks or equivalent per week      Comment: holidays - wine/rare         ROS    Physical Exam   Constitutional: She appears well-developed and well-nourished. No distress. /72 (BP 1 Location: Left arm, BP Patient Position: Sitting)  Pulse 64  Temp 97.6 °F (36.4 °C) (Oral)   Resp 17  Ht 5' 3\" (1.6 m)  Wt 168 lb (76.2 kg)  LMP 01/29/2011  SpO2 99%  BMI 29.76 kg/m2Body mass index is 29.76 kg/(m^2). HENT:   Mouth/Throat: Oropharynx is clear and moist.   Neck: No JVD present. Carotid bruit is not present. No thyromegaly present. Cardiovascular: Normal rate, regular rhythm, normal heart sounds and intact distal pulses. Pulmonary/Chest: Effort normal and breath sounds normal.   Musculoskeletal: She exhibits no edema. Neurological: She is alert. Skin: Skin is warm and dry. She is not diaphoretic. Nursing note and vitals reviewed. ASSESSMENT and PLAN  Ty Sharma was seen today for thyroid problem.     Diagnoses and all orders for this visit:    Hypothyroidism due to acquired atrophy of thyroid - Well controlled and stable. her medications were reviewed and refilled where necessary as noted below. Labs ordered as noted. -     TSH 3RD GENERATION    Breast cancer screening  -     [facility] MAMMO BI SCREENING INCL CAD; Future      Follow-up Disposition:  Return in about 6 months (around 11/10/2017).

## 2017-05-10 NOTE — MR AVS SNAPSHOT
Visit Information Date & Time Provider Department Dept. Phone Encounter #  
 5/10/2017  7:45 AM Christophe Chisholm MD [facility] [address] [facility] [id number] Follow-up Instructions Return in about 6 months (around 11/10/2017). Your Appointments 7/21/2017 10:00 AM  
ESTABLISHED PATIENT with Alba Sylvester NP  
[facility] 22 679 ([facility]) Appt Note: EP 3 month  7/11/16 LAP GASTRIC BYPASS W EGD--EB; $0CP $0PB  
 [address] [facility] [address] [facility] [address]  
[address] [address]  
  
    
 7/21/2017 10:20 AM  
[phone] ([facility] Appt Note: EP discuss poss hernia surgery $0Cp $0PB  
 [address] [address]  
[address] [facility] Upcoming Health Maintenance Date Due  
 FOOT EXAM Q1 5/31/1979 MICROALBUMIN Q1 10/13/2016 EYE EXAM RETINAL OR DILATED Q1 2/8/2017 HEMOGLOBIN A1C Q6M 5/2/2017 LIPID PANEL Q1 6/27/2017 INFLUENZA AGE 9 TO ADULT 8/1/2017 DTaP/Tdap/Td series (2 - Td) 11/12/2023 Allergies as of 5/10/2017  Review Complete On: 5/4/2017 By: Matthew Ibarra NP Severity Noted Reaction Type Reactions Sulfa (Sulfonamide Antibiotics) High 03/04/2011   Side Effect Anaphylaxis, Other (comments) Difficulty breathing/high fever/delusional    
 Aleve [Naproxen Sodium]  09/30/2013    Nausea Only Codeine  03/04/2011   Side Effect Nausea Only Ibuprofen  03/04/2011   Side Effect Hives Lactose  02/23/2016   Intolerance Other (comments) Gas Penicillin G  03/04/2011   Side Effect Hives Current Immunizations  Reviewed on 7/11/2016 Name Date Influenza Vaccine 11/12/2013 Influenza Vaccine (Quad) PF 10/13/2015 Influenza Vaccine (Whole Virus) 3/1/2013 Influenza Vaccine PF 10/23/2014 Influenza Vaccine Split 11/2/2011 Pneumococcal Polysaccharide (PPSV-23) 3/11/2015 Tdap 11/12/2013 Not reviewed this visit You Were Diagnosed With   
  
 Codes Comments Hypothyroidism due to acquired atrophy of thyroid    -  Primary ICD-10-CM: E03.4 ICD-9-CM: 244.8, 246.8 Vitals BP Pulse Temp Resp Height(growth percentile) Weight(growth percentile) 114/72 (BP 1 Location: Left arm, BP Patient Position: Sitting) 64 97.6 °F (36.4 °C) (Oral) 17 5' 3\" (1.6 m) 168 lb (76.2 kg) LMP SpO2 BMI OB Status Smoking Status 01/29/2011 99% 29.76 kg/m2 Hysterectomy Former Smoker BMI and BSA Data Body Mass Index Body Surface Area  
 29.76 kg/m 2 1.84 m 2 Preferred Pharmacy Pharmacy Name Phone [id number], [address] [place] [address] [phone] Your Updated Medication List  
  
   
This list is accurate as of: 5/10/17  8:11 AM.  Always use your most recent med list.  
  
  
  
  
 ascorbic acid (vitamin C) 250 mg tablet Commonly known as:  VITAMIN C Take 500 mg by mouth two (2) times a day. Benefiber Clear 3 gram/3.5 gram Powd Generic drug:  wheat dextrin Take  by mouth daily. 2 Teaspoons daily AT 7AM AND 5PM  
  
 buPROPion  mg SR tablet Commonly known as:  WELLBUTRIN SR  
TAKE 1 TABLET BY MOUTH EVERY DAY  
  
 busPIRone 5 mg tablet Commonly known as:  BUSPAR Take 10 mg by mouth as needed. butalbital-acetaminophen-caffeine -40 mg per tablet Commonly known as:  Maria Del Carmen Mitts Take 1 Tab by mouth every six (6) hours as needed for Pain. Max Daily Amount: 4 Tabs. calcium citrate-vitamin d3 315-200 mg-unit Tab Commonly known as:  CITRACAL+D Take 2 Tabs by mouth two (2) times daily (with meals). Indications: Pt states, \"medication has been change to take at noon and dinner. \"  
  
 cetirizine 10 mg tablet Commonly known as:  ZYRTEC Take  by mouth nightly. CULTURELLE PO Take 1 Tab by mouth daily. cyanocobalamin 1,000 mcg tablet Take 5,000 mcg by mouth daily. cyclobenzaprine 10 mg tablet Commonly known as:  FLEXERIL  
TAKE 1 TABLET BY MOUTH 3 TIMES A DAY AS NEEDED MUSCLE SPASM  
  
 escitalopram oxalate 20 mg tablet Commonly known as:  Aliene Apo TAKE 1 TAB BY MOUTH DAILY. famotidine 40 mg tablet Commonly known as:  PEPCID Take 1 Tab by mouth two (2) times a day. Indications: gastroesophageal reflux disease FLINTSTONES COMPLETE Chew Generic drug:  pediatric multivitamin no.76 Take 1 Tab by mouth daily. FLONASE 50 mcg/actuation nasal spray Generic drug:  fluticasone 2 Sprays by Both Nostrils route nightly as needed for Rhinitis (as needed). gabapentin 300 mg capsule Commonly known as:  NEURONTIN  
TAKE 1 CAPSULES BY MOUTH IN THE MORNING,, 1 AT NOON, 2 CAPS AT BEDTIME  
  
 hyoscyamine SL 0.125 mg SL tablet Commonly known as:  LEVSIN/SL  
1 Tab by SubLINGual route every four (4) hours as needed for Cramping.  
  
 ketoconazole 2 % topical cream  
Commonly known as:  NIZORAL Apply  to affected area daily. magnesium 250 mg Tab Take 500 mg by mouth daily. MIRALAX 17 gram packet Generic drug:  polyethylene glycol Take 17 g by mouth daily. ondansetron 4 mg disintegrating tablet Commonly known as:  ZOFRAN ODT  
TAKE 1 TABLET BY MOUTH EVERY 8 HOURS AS NEEDED FOR NAUSEA  
  
 OTHER  
C-PAP Machine SYNTHROID 175 mcg tablet Generic drug:  levothyroxine TAKE 1 TABLET BY MOUTH EVERY DAY  
  
 SYSTANE (PROPYLENE GLYCOL) 0.4-0.3 % Drop Generic drug:  peg 400-propylene glycol Administer 1 Drop to both eyes as needed. TYLENOL EXTRA STRENGTH 500 mg tablet Generic drug:  acetaminophen Take 1,000 mg by mouth every six (6) hours as needed for Pain. VITAMIN D3 5,000 unit Tab tablet Generic drug:  cholecalciferol (VITAMIN D3) Take 1,000 Units by mouth two (2) times a day. LIQUID SOFT GEL We Performed the Following TSH 3RD GENERATION [92671 CPT(R)] Follow-up Instructions Return in about 6 months (around 11/10/2017). To-Do List   
 05/10/2017 Imaging:  [facility] MAMMO BI SCREENING INCL CAD Introducing [facility] & HEALTH SERVICES! Dear Junior Arndt: 
Thank you for requesting a [employer] account. Our records indicate that you already have an active [employer] account. You can access your account anytime at [email or web address] Did you know that you can access your hospital and ER discharge instructions at any time in [employer]? You can also review all of your test results from your hospital stay or ER visit. Additional Information If you have questions, please visit the Frequently Asked Questions section of the [employer] website at [email or web address] Remember, [employer] is NOT to be used for urgent needs. For medical emergencies, dial 911. Now available from your iPhone and Android! Please provide this summary of care documentation to your next provider. Your primary care clinician is listed as Eric Ritter. If you have any questions after today's visit, please call [phone].

## 2017-05-11 LAB — TSH SERPL DL<=0.005 MIU/L-ACNC: 1.12 UIU/ML (ref 0.45–4.5)

## 2017-05-16 RX ORDER — HYOSCYAMINE SULFATE 0.12 MG/1
TABLET SUBLINGUAL
Qty: 30 TAB | Refills: 1 | Status: SHIPPED | OUTPATIENT
Start: 2017-05-16 | End: 2018-02-01 | Stop reason: SDUPTHER

## 2017-07-13 ENCOUNTER — OFFICE VISIT (OUTPATIENT)
Dept: INTERNAL MEDICINE CLINIC | Age: 47
End: 2017-07-13

## 2017-07-13 VITALS
BODY MASS INDEX: 29.41 KG/M2 | OXYGEN SATURATION: 98 % | RESPIRATION RATE: 16 BRPM | SYSTOLIC BLOOD PRESSURE: 126 MMHG | WEIGHT: 166 LBS | TEMPERATURE: 98 F | DIASTOLIC BLOOD PRESSURE: 78 MMHG | HEIGHT: 63 IN | HEART RATE: 64 BPM

## 2017-07-13 DIAGNOSIS — M25.531 WRIST PAIN, CHRONIC, RIGHT: Primary | ICD-10-CM

## 2017-07-13 DIAGNOSIS — G89.29 WRIST PAIN, CHRONIC, RIGHT: Primary | ICD-10-CM

## 2017-07-13 DIAGNOSIS — F41.9 ANXIETY: ICD-10-CM

## 2017-07-13 RX ORDER — ESCITALOPRAM OXALATE 10 MG/1
10 TABLET ORAL DAILY
Qty: 30 TAB | Refills: 5 | Status: SHIPPED | OUTPATIENT
Start: 2017-07-13 | End: 2018-02-16 | Stop reason: SDUPTHER

## 2017-07-13 RX ORDER — GABAPENTIN 100 MG/1
CAPSULE ORAL 3 TIMES DAILY
COMMUNITY
End: 2017-07-31

## 2017-07-13 RX ORDER — ESCITALOPRAM OXALATE 10 MG/1
10 TABLET ORAL DAILY
COMMUNITY
End: 2017-07-13 | Stop reason: SDUPTHER

## 2017-07-13 NOTE — MR AVS SNAPSHOT
Visit Information Date & Time Provider Department Dept. Phone Encounter #  
 7/13/2017  9:30 AM Haley Greco MD [facility] [address] [facility] [id number] Your Appointments 7/21/2017 10:00 AM  
ESTABLISHED PATIENT with Carlito Yeh NP  
[facility] 22 511 ([address]) Appt Note: EP 3 month  7/11/16 LAP GASTRIC BYPASS W EGD--EB; $0CP $0PB  
 [address] [facility] [address] [facility] [address]  
[address] [address]  
  
    
 7/21/2017 10:20 AM  
ESTABLISHED PATIENT with Cedric Berumen MD  
[address] [facility] 940 ([address]) Appt Note: EP discuss poss hernia surgery $0Cp $0PB  
 [address] [facility] [address] [facility] [address]  
[facility] [address] [facility] [address] [address]  
  
    
 11/6/2017  8:15 AM  
ROUTINE CARE with Taylor Boas, MD  
[facility] [place] [address] [facility]) Appt Note: 6 mo thyroid; 6 mo thyroid [address] [facility] [address]  
[phone]  
  
   
 [address] [place] [address] [facility] [address] Upcoming Health Maintenance Date Due  
 FOOT EXAM Q1 5/31/1979 MICROALBUMIN Q1 10/13/2016 EYE EXAM RETINAL OR DILATED Q1 2/8/2017 HEMOGLOBIN A1C Q6M 5/2/2017 LIPID PANEL Q1 6/27/2017 INFLUENZA AGE 9 TO ADULT 8/1/2017 DTaP/Tdap/Td series (2 - Td) 11/12/2023 Allergies as of 7/13/2017  Review Complete On: 6/80/9788 By: Radha Nichole Severity Noted Reaction Type Reactions Sulfa (Sulfonamide Antibiotics) High 03/04/2011   Side Effect Anaphylaxis, Other (comments) Difficulty breathing/high fever/delusional    
 Aleve [Naproxen Sodium]  09/30/2013    Nausea Only Codeine  03/04/2011   Side Effect Nausea Only Ibuprofen  03/04/2011   Side Effect Hives Lactose  02/23/2016   Intolerance Other (comments) Gas Penicillin G  03/04/2011   Side Effect Hives Current Immunizations  Reviewed on 7/11/2016 Name Date Influenza Vaccine 11/12/2013 Influenza Vaccine (Quad) PF 10/13/2015 Influenza Vaccine (Whole Virus) 3/1/2013 Influenza Vaccine PF 10/23/2014 Influenza Vaccine Split 11/2/2011 Pneumococcal Polysaccharide (PPSV-23) 3/11/2015 Tdap 11/12/2013 Not reviewed this visit You Were Diagnosed With   
  
 Codes Comments Wrist pain, chronic, right    -  Primary ICD-10-CM: M25.531, G89.29 ICD-9-CM: 719.43, 338.29 Anxiety     ICD-10-CM: F41.9 ICD-9-CM: 300.00 Vitals BP Pulse Temp Resp Height(growth percentile) Weight(growth percentile) 126/78 (BP 1 Location: Left arm, BP Patient Position: Sitting) 64 98 °F (36.7 °C) (Oral) 16 5' 3\" (1.6 m) 166 lb (75.3 kg) LMP SpO2 BMI OB Status Smoking Status 01/29/2011 98% 29.41 kg/m2 Hysterectomy Former Smoker Vitals History BMI and BSA Data Body Mass Index Body Surface Area  
 29.41 kg/m 2 1.83 m 2 Preferred Pharmacy Pharmacy Name Phone [id number] [address] [phone] Your Updated Medication List  
  
   
This list is accurate as of: 7/13/17 10:35 AM.  Always use your most recent med list.  
  
  
  
  
 ascorbic acid (vitamin C) 250 mg tablet Commonly known as:  VITAMIN C Take 500 mg by mouth two (2) times a day. Benefiber Clear 3 gram/3.5 gram Powd Generic drug:  wheat dextrin Take  by mouth daily. 2 Teaspoons daily AT 7AM AND 5PM  
  
 buPROPion  mg SR tablet Commonly known as:  WELLBUTRIN SR  
TAKE 1 TABLET BY MOUTH EVERY DAY  
  
 busPIRone 5 mg tablet Commonly known as:  BUSPAR Take 10 mg by mouth as needed. butalbital-acetaminophen-caffeine -40 mg per tablet Commonly known as:  Gwynda Duet Take 1 Tab by mouth every six (6) hours as needed for Pain. Max Daily Amount: 4 Tabs. calcium citrate-vitamin d3 315-200 mg-unit Tab Commonly known as:  CITRACAL+D Take 2 Tabs by mouth two (2) times daily (with meals). Indications: Pt states, \"medication has been change to take at noon and dinner. \"  
  
 cetirizine 10 mg tablet Commonly known as:  ZYRTEC Take  by mouth nightly. CULTURELLE PO Take 1 Tab by mouth daily. cyanocobalamin 1,000 mcg tablet Take 5,000 mcg by mouth daily. cyclobenzaprine 10 mg tablet Commonly known as:  FLEXERIL  
TAKE 1 TABLET BY MOUTH 3 TIMES A DAY AS NEEDED MUSCLE SPASM * escitalopram oxalate 20 mg tablet Commonly known as:  Chilchinbito Puneet TAKE 1 TAB BY MOUTH DAILY. * escitalopram oxalate 10 mg tablet Commonly known as:  Jeannine Puneet Take 1 Tab by mouth daily. famotidine 40 mg tablet Commonly known as:  PEPCID Take 1 Tab by mouth two (2) times a day. Indications: gastroesophageal reflux disease FLINTSTONES COMPLETE Chew Generic drug:  pediatric multivitamin no.76 Take 1 Tab by mouth daily. FLONASE 50 mcg/actuation nasal spray Generic drug:  fluticasone 2 Sprays by Both Nostrils route nightly as needed for Rhinitis (as needed). gabapentin 100 mg capsule Commonly known as:  NEURONTIN Take  by mouth three (3) times daily. 1 In am, 1 in the evening, 2 at bedtime. hyoscyamine SL 0.125 mg SL tablet Commonly known as:  LEVSIN/SL TAKE 1 TAB BY SUBLINGUAL ROUTE EVERY FOUR (4) HOURS AS NEEDED FOR CRAMPING.  
  
 ketoconazole 2 % topical cream  
Commonly known as:  NIZORAL Apply  to affected area daily. magnesium 250 mg Tab Take 500 mg by mouth daily. MIRALAX 17 gram packet Generic drug:  polyethylene glycol Take 17 g by mouth daily. ondansetron 4 mg disintegrating tablet Commonly known as:  ZOFRAN ODT  
TAKE 1 TABLET BY MOUTH EVERY 8 HOURS AS NEEDED FOR NAUSEA  
  
 OTHER  
C-PAP Machine SYNTHROID 175 mcg tablet Generic drug:  levothyroxine TAKE 1 TABLET BY MOUTH EVERY DAY  
  
 SYSTANE (PROPYLENE GLYCOL) 0.4-0.3 % Drop Generic drug:  peg 400-propylene glycol Administer 1 Drop to both eyes as needed. TYLENOL EXTRA STRENGTH 500 mg tablet Generic drug:  acetaminophen Take 1,000 mg by mouth every six (6) hours as needed for Pain. VITAMIN D3 5,000 unit Tab tablet Generic drug:  cholecalciferol (VITAMIN D3) Take 1,000 Units by mouth two (2) times a day. LIQUID SOFT GEL * Notice: This list has 2 medication(s) that are the same as other medications prescribed for you. Read the directions carefully, and ask your doctor or other care provider to review them with you. Prescriptions Sent to Pharmacy Refills  
 escitalopram oxalate (LEXAPRO) 10 mg tablet 5 Sig: Take 1 Tab by mouth daily. Class: Normal  
 Pharmacy: [id number] [place], [address] [place] [address] Carmen Ph #: [phone] Route: Oral  
  
We Performed the Following REFERRAL TO ORTHOPEDIC SURGERY [REF62 Custom] Referral Information Referral ID Referred By Referred To  
  
 [id number] JOSUÉIN, [address] [place] [address], [address] [place] [address] Phone: [phone] Fax: [phone] Visits Status Start Date End Date 1 New Request 7/13/17 7/13/18 If your referral has a status of pending review or denied, additional information will be sent to support the outcome of this decision. hospitals & HEALTH SERVICES! Dear Dara Mosqueda: 
Thank you for requesting a [employer] account. Our records indicate that you already have an active [employer] account. You can access your account anytime at [email or web address] Did you know that you can access your hospital and ER discharge instructions at any time in [employer]? You can also review all of your test results from your hospital stay or ER visit. Additional Information If you have questions, please visit the Frequently Asked Questions section of the [email or web address] website at [email or web address] Remember, [employer] is NOT to be used for urgent needs. For medical emergencies, dial 911. Now available from your iPhone and Android! Please provide this summary of care documentation to your next provider. Your primary care clinician is listed as Kathrin Morrissey. If you have any questions after today's visit, please call [phone].

## 2017-07-13 NOTE — PROGRESS NOTES
Daniele Silva is a 49 y.o. female who presents today for Wrist Pain (right wrist. No known trauma. )  . She has a history of   Patient Active Problem List   Diagnosis Code    Anxiety F41.9    Depression F32.9    Fibromyalgia M79.7    Hypothyroidism due to acquired atrophy of thyroid E03.4    History of weight loss surgery Z98.84   . Today patient is here for an acute visit. Musculoskeletal pain:  She wishes to address discomfort in the Right wrist at today's visit. This has been moderate in nature, gradual, starting about 10 years ago in onset. Pt notes that she gets pain and progressive numbness to medal aspect of hand with user. Self treatment: tylenol and sleeping in a brace for 10+ years. Tested for carpal tunnel in the past, but 10+ yrs. ROS  Review of Systems   Constitutional: Negative for chills, fever, malaise/fatigue and weight loss. HENT: Negative for hearing loss. Respiratory: Negative for cough, hemoptysis and sputum production. Cardiovascular: Negative for chest pain, palpitations, orthopnea and claudication. Genitourinary: Negative for dysuria and urgency. Musculoskeletal: Positive for joint pain (R wrist adn some on elbow. ). Negative for back pain, myalgias and neck pain. Skin: Negative for itching and rash. Neurological: Positive for tingling (To median distribution). Negative for headaches. Visit Vitals    /78 (BP 1 Location: Left arm, BP Patient Position: Sitting)    Pulse 64    Temp 98 °F (36.7 °C) (Oral)    Resp 16    Ht 5' 3\" (1.6 m)    Wt 166 lb (75.3 kg)    SpO2 98%    BMI 29.41 kg/m2       Physical Exam   Constitutional: She is oriented to person, place, and time. She appears well-developed and well-nourished. Neck: Normal range of motion. Neck supple. No thyromegaly present. Pulmonary/Chest: Effort normal. No respiratory distress. Musculoskeletal: Normal range of motion. She exhibits no edema.    + phalen's   Neurological: She is alert and oriented to person, place, and time. Skin: Skin is warm and dry. Psychiatric: She has a normal mood and affect. Her behavior is normal.         Current Outpatient Prescriptions   Medication Sig    escitalopram oxalate (LEXAPRO) 10 mg tablet Take 1 Tab by mouth daily.  gabapentin (NEURONTIN) 100 mg capsule Take  by mouth three (3) times daily. 1 In am, 1 in the evening, 2 at bedtime.  hyoscyamine SL (LEVSIN/SL) 0.125 mg SL tablet TAKE 1 TAB BY SUBLINGUAL ROUTE EVERY FOUR (4) HOURS AS NEEDED FOR CRAMPING.  famotidine (PEPCID) 40 mg tablet Take 1 Tab by mouth two (2) times a day. Indications: gastroesophageal reflux disease    SYNTHROID 175 mcg tablet TAKE 1 TABLET BY MOUTH EVERY DAY    ondansetron (ZOFRAN ODT) 4 mg disintegrating tablet TAKE 1 TABLET BY MOUTH EVERY 8 HOURS AS NEEDED FOR NAUSEA    cyanocobalamin 1,000 mcg tablet Take 5,000 mcg by mouth daily.  pediatric multivitamin no.76 (FLINTSTONES COMPLETE) chew Take 1 Tab by mouth daily.  acetaminophen (TYLENOL EXTRA STRENGTH) 500 mg tablet Take 1,000 mg by mouth every six (6) hours as needed for Pain.  polyethylene glycol (MIRALAX) 17 gram packet Take 17 g by mouth daily.  buPROPion SR (WELLBUTRIN SR) 100 mg SR tablet TAKE 1 TABLET BY MOUTH EVERY DAY    butalbital-acetaminophen-caffeine (FIORICET, ESGIC) -40 mg per tablet Take 1 Tab by mouth every six (6) hours as needed for Pain. Max Daily Amount: 4 Tabs.  cyclobenzaprine (FLEXERIL) 10 mg tablet TAKE 1 TABLET BY MOUTH 3 TIMES A DAY AS NEEDED MUSCLE SPASM (Patient taking differently: TAKE 1 TABLET BY MOUTH 3 TIMES A DAY AS NEEDED MUSCLE SPASM (TAKES EVERY BEDTIME))    busPIRone (BUSPAR) 5 mg tablet Take 10 mg by mouth as needed.  ascorbic acid, vitamin C, (VITAMIN C) 250 mg tablet Take 500 mg by mouth two (2) times a day.  LACTOBACILLUS RHAMNOSUS GG (CULTURELLE PO) Take 1 Tab by mouth daily.  magnesium 250 mg tab Take 500 mg by mouth daily.     calcium citrate-vitamin d3 (CITRACAL+D) 315-200 mg-unit tab Take 2 Tabs by mouth two (2) times daily (with meals). Indications: Pt states, \"medication has been change to take at noon and dinner. \"    cetirizine (ZYRTEC) 10 mg tablet Take  by mouth nightly.  wheat dextrin (BENEFIBER CLEAR) 3 gram/3.5 gram powd Take  by mouth daily. 2 Teaspoons daily AT 7AM AND 5PM    cholecalciferol, VITAMIN D3, (VITAMIN D3) 5,000 unit tab tablet Take 1,000 Units by mouth two (2) times a day. LIQUID SOFT GEL    ketoconazole (NIZORAL) 2 % topical cream Apply  to affected area daily. (Patient taking differently: Apply  to affected area as needed.)    fluticasone (FLONASE) 50 mcg/actuation nasal spray 2 Sprays by Both Nostrils route nightly as needed for Rhinitis (as needed).  peg 400-propylene glycol (SYSTANE) 0.4-0.3 % drop Administer 1 Drop to both eyes as needed.  OTHER C-PAP Machine    escitalopram oxalate (LEXAPRO) 20 mg tablet TAKE 1 TAB BY MOUTH DAILY. No current facility-administered medications for this visit.          Past Medical History:   Diagnosis Date    Arthritis     Autoimmune disease ([facility]     FIBROMYALGIA    BMI 45.0-49.9, adult ([facility]     Chronic pain     fibromyalgia    Depression     and anxiety    Diabetes ([facility]     HX NIDDM (NO MEDS 4-12-17)    Elevated lipids     Fibromyalgia     GERD (gastroesophageal reflux disease) 2013    H/O seasonal allergies     Headache     Hypercholesterolemia     Hypertension     HX HTN, NO MEDS(4-12-17)    Migraines     Morbid obesity ([address]     LINO on CPAP     Thyroid disease     HYPOTHYROID      Past Surgical History:   Procedure Laterality Date    HX GASTRIC BYPASS  07/2016    GASTRIC BYPASS    HX GYN      ECTOPIC PREGNANCE LEFT TUBE REMOVED    HX HYSTERECTOMY  2011    HX LAP CHOLECYSTECTOMY  04/20/2017    HX LAP GASTRIC BYPASS  7/11/16    by Dr Carlos Lam  2011    right ovarian tumor    HX TUBAL LIGATION        Social History Substance Use Topics    Smoking status: Former Smoker     Packs/day: 1.00     Years: 20.00     Types: Cigarettes     Quit date: 3/4/2005    Smokeless tobacco: Never Used    Alcohol use 0.0 oz/week     0 Standard drinks or equivalent per week      Comment: holidays - wine/rare      Family History   Problem Relation Age of Onset    Diabetes Mother     Hypertension Mother     Crohn's Disease Mother     Asthma Mother     Lung Disease Mother      COPD    Migraines Mother     Cancer Mother      SKIN    Depression Mother     Other Mother      FIBROMYALGIA    Gout Mother    Veverly Samara Mother     Cancer Father      oral    Diabetes Father      steroid induced    Liver Disease Father      HEP C    Alcohol abuse Sister     Heart Disease Sister     Migraines Sister     Depression Sister      BIPOLAR DISORDER    Anesth Problems Daughter      N&V    Asthma Daughter     Cancer Paternal Grandmother      colon        Allergies   Allergen Reactions    Sulfa (Sulfonamide Antibiotics) Anaphylaxis and Other (comments)     Difficulty breathing/high fever/delusional      Aleve [Naproxen Sodium] Nausea Only    Codeine Nausea Only    Ibuprofen Hives    Lactose Other (comments)     Gas     Penicillin G Hives        Assessment/Plan  Danyelle Saline was seen today for wrist pain. Diagnoses and all orders for this visit:    Wrist pain, chronic, right - consistent with impingement syndrome. Given chronicity and lack of response to conservative management, send to ortho. -     REFERRAL TO ORTHOPEDIC SURGERY    Anxiety - Refill, cutting back on SSRI. -     escitalopram oxalate (LEXAPRO) 10 mg tablet; Take 1 Tab by mouth daily.         Follow-up Disposition: Not on File    Radha Mayers MD  7/13/2017

## 2017-07-21 ENCOUNTER — OFFICE VISIT (OUTPATIENT)
Dept: SURGERY | Age: 47
End: 2017-07-21

## 2017-07-21 VITALS
DIASTOLIC BLOOD PRESSURE: 74 MMHG | WEIGHT: 158 LBS | OXYGEN SATURATION: 99 % | BODY MASS INDEX: 28 KG/M2 | RESPIRATION RATE: 18 BRPM | HEIGHT: 63 IN | HEART RATE: 71 BPM | SYSTOLIC BLOOD PRESSURE: 132 MMHG | TEMPERATURE: 98.4 F

## 2017-07-21 VITALS
HEIGHT: 63 IN | WEIGHT: 158 LBS | RESPIRATION RATE: 18 BRPM | DIASTOLIC BLOOD PRESSURE: 74 MMHG | HEART RATE: 71 BPM | SYSTOLIC BLOOD PRESSURE: 132 MMHG | BODY MASS INDEX: 28 KG/M2 | OXYGEN SATURATION: 99 % | TEMPERATURE: 98.4 F

## 2017-07-21 DIAGNOSIS — Z98.84 HX OF GASTRIC BYPASS: ICD-10-CM

## 2017-07-21 DIAGNOSIS — D50.9 IRON DEFICIENCY ANEMIA, UNSPECIFIED IRON DEFICIENCY ANEMIA TYPE: ICD-10-CM

## 2017-07-21 DIAGNOSIS — K43.2 INCISIONAL HERNIA, WITHOUT OBSTRUCTION OR GANGRENE: ICD-10-CM

## 2017-07-21 DIAGNOSIS — K43.2 VENTRAL INCISIONAL HERNIA WITHOUT OBSTRUCTION OR GANGRENE: Primary | ICD-10-CM

## 2017-07-21 DIAGNOSIS — E53.8 VITAMIN B12 DEFICIENCY: ICD-10-CM

## 2017-07-21 DIAGNOSIS — Z90.710 HX OF HYSTERECTOMY: ICD-10-CM

## 2017-07-21 DIAGNOSIS — K90.9 INTESTINAL MALABSORPTION, UNSPECIFIED TYPE: ICD-10-CM

## 2017-07-21 DIAGNOSIS — E51.9 VITAMIN B1 DEFICIENCY: ICD-10-CM

## 2017-07-21 DIAGNOSIS — E55.9 VITAMIN D DEFICIENCY: ICD-10-CM

## 2017-07-21 DIAGNOSIS — E66.01 MORBID OBESITY DUE TO EXCESS CALORIES (HCC): Primary | ICD-10-CM

## 2017-07-21 NOTE — PATIENT INSTRUCTIONS
Abdominal Hernia Repair: Before Your Surgery  What is abdominal hernia repair surgery? An abdominal hernia repair is a type of surgery. It fixes a problem called a hernia. A hernia is a bulge under the skin in your belly. It happens when you have a weak spot in your belly muscles and a piece of your intestines or tissues pokes through your muscles. This can cause pain. You may notice the pain most when you lift something heavy. You can have a hernia near your belly button. Or it may be in a scar from an earlier surgery. To fix it, the doctor will do one of two kinds of surgery. In open surgery, the doctor makes one cut near the hernia. This cut is called an incision. In laparoscopic surgery, the doctor makes several very small incisions and uses a thin, lighted scope and small tools. In either type of surgery, the doctor pushes the bulge back in place, if needed. Then the doctor sews the healthy tissue back together. Often the doctor patches the weak spot with a piece of material.  Laparoscopic surgery leaves several small scars. Open surgery leaves one long scar. The scars fade with time. You will probably need to take 1 to 2 weeks off from work. But if your job requires heavy lifting or other physical work, you may need to take 4 to 6 weeks off. Follow-up care is a key part of your treatment and safety. Be sure to make and go to all appointments, and call your doctor if you are having problems. It's also a good idea to know your test results and keep a list of the medicines you take. What happens before surgery? Surgery can be stressful. This information will help you understand what you can expect. And it will help you safely prepare for surgery. Preparing for surgery  · Understand exactly what surgery is planned, along with the risks, benefits, and other options. · Tell your doctors ALL the medicines, vitamins, supplements, and herbal remedies you take.  Some of these can increase the risk of bleeding or interact with anesthesia. · If you take blood thinners, such as warfarin (Coumadin), clopidogrel (Plavix), or aspirin, be sure to talk to your doctor. He or she will tell you if you should stop taking these medicines before your surgery. Make sure that you understand exactly what your doctor wants you to do. · Your doctor will tell you which medicines to take or stop before your surgery. You may need to stop taking certain medicines a week or more before surgery. So talk to your doctor as soon as you can. · If you have an advance directive, let your doctor know. It may include a living will and a durable power of  for health care. Bring a copy to the hospital. If you don't have one, you may want to prepare one. It lets your doctor and loved ones know your health care wishes. Doctors advise that everyone prepare these papers before any type of surgery or procedure. What happens on the day of surgery? · Follow the instructions exactly about when to stop eating and drinking. If you don't, your surgery may be canceled. If your doctor told you to take your medicines on the day of surgery, take them with only a sip of water. · Take a bath or shower before you come in for your surgery. Do not apply lotions, perfumes, deodorants, or nail polish. · Do not shave the surgical site yourself. · Take off all jewelry and piercings. And take out contact lenses, if you wear them. At the hospital or surgery center  · Bring a picture ID. · The area for surgery is often marked to make sure there are no errors. · You will be kept comfortable and safe by your anesthesia provider. You will be asleep during the surgery. · The surgery will take 30 minutes to 2 hours. It depends on how large the hernia is and where it is. Going home  · Be sure you have someone to drive you home. Anesthesia and pain medicine make it unsafe for you to drive.   · You will be given more specific instructions about recovering from your surgery. They will cover things like diet, wound care, follow-up care, driving, and getting back to your normal routine. When should you call your doctor? · You have questions or concerns. · You don't understand how to prepare for your surgery. · You become ill before the surgery (such as fever, flu, or a cold). · You need to reschedule or have changed your mind about having the surgery. Where can you learn more? Go to http://josee-gutierrez.info/. Enter U288 in the search box to learn more about \"Abdominal Hernia Repair: Before Your Surgery. \"  Current as of: August 9, 2016  Content Version: 11.3  © 4213-7945 [employer]. Care instructions adapted under license by [employer] (which disclaims liability or warranty for this information). If you have questions about a medical condition or this instruction, always ask your healthcare professional. [address] any warranty or liability for your use of this information.

## 2017-07-21 NOTE — PROGRESS NOTES
Dunia Monahan is a 49 y.o. female 1 yr s/p lap malabsorptive gastric bypass, down 94 pounds. Weight today is 158 pounds. Patient stated doing well. Stated she is really to have abdominal hernia repaired. Denies nausea, no vomiting, no heartburn/reflux. Denies dysphagia. No fever/no chills, no shortness of breath, no chest pain, and no abdominal pain. Tolerating all foods, getting 50-60 grams of protein daily. Eating protein first at all meals. Snacking minimal.Drinking at least 60 ounces of flavored water daily. No soda drinking. No eating/drinking together. Tolerating all vitamins and medications. Exercising with walking daily. Bowel movements once daily that are formed. Co-Morbid(s)     Resolved      Was anti coagulation initiated for presumed / confirmed DVT/PE? NO    Was an incisional hernia noted on exam?       YES      COMORBIDITY     SLEEP APNEA                 NO        GERD  (req.meds)            NO  HYPERLIPIDEMIA            NO  HYPERTENSION              NO           DIABETES                         NO            HPI    Review of Systems   Constitutional: Negative for chills, fever and malaise/fatigue. Cardiovascular: Negative for chest pain, palpitations and leg swelling. Gastrointestinal: Negative for abdominal pain, blood in stool, constipation, diarrhea, heartburn, nausea and vomiting. Genitourinary: Negative for dysuria. Physical Exam   Constitutional: She is oriented to person, place, and time. Cardiovascular: Normal rate, regular rhythm and normal heart sounds. Pulmonary/Chest: Effort normal and breath sounds normal.   Abdominal: Soft. Bowel sounds are normal. She exhibits no distension. There is no tenderness. There is no rebound and no guarding. Surgical incisions dry and intact. Presence of 2 incisional hernias palpated at the midline below the umbilicus. Both reducible and non-tender. Musculoskeletal: Normal range of motion.    Neurological: She is alert and oriented to person, place, and time. Skin: Skin is warm and dry. No rash noted. No erythema. Psychiatric: She has a normal mood and affect. Her behavior is normal. Thought content normal.   Blood pressure 132/74, pulse 71, temperature 98.4 °F (36.9 °C), temperature source Oral, resp. rate 18, height 5' 3\" (1.6 m), weight 158 lb (71.7 kg), last menstrual period 01/29/2011, SpO2 99 %. ASSESSMENT and PLAN  Morbid Obesity 1 yr s/p lap malabsorptive gastric bypass, down 94 pounds. Weight today is 158 pounds. Patient to meet with Dr. Rupa Rivas to discuss surgical intervention with incisional hernia repair. Advised patient regard to diet that is high-protein, low-fat, low-sugar, limited carbohydrates. Strive for 60 grams of protein daily. If having a snack, foods that are protein or fiber rich. Still pay attention to behavioral factor and habits. No eating/drinking together, chew foods well, and portion control. Drink at least 40 ounces of non-carbonated, non-calorie beverages daily. Continue vitamin regiment daily. Exercise at least 3 days a week with cardiovascular and strength training. Provided patient with routine lab work slip. Advised anything concerning with labs, will contact patient prior to next visit. Patient to follow up in 6 months for bariatric follow up. Patient verbalized understanding and questions were answered to the best of my knowledge and ability. Hernia surgery educational materials were provided. Advised to call office if any questions/concerns. 17 minutes was spent with patient, greater than 50% of time spent counseling.

## 2017-07-21 NOTE — MR AVS SNAPSHOT
Visit Information Date & Time Provider Department Dept. Phone Encounter #  
 7/21/2017 10:00 AM Carlito Yeh NP [facility] 22 533 [phone] [id number] Your Appointments 11/6/2017  8:15 AM  
ROUTINE CARE with Taylor Boas, MD  
[facility]) Appt Note: 6 mo thyroid; 6 mo thyroid [address] [facility] [address]  
[phone]  
  
   
 [address] [facility] [address] Upcoming Health Maintenance Date Due  
 FOOT EXAM Q1 5/31/1979 MICROALBUMIN Q1 10/13/2016 EYE EXAM RETINAL OR DILATED Q1 2/8/2017 HEMOGLOBIN A1C Q6M 5/2/2017 LIPID PANEL Q1 6/27/2017 INFLUENZA AGE 9 TO ADULT 8/1/2017 DTaP/Tdap/Td series (2 - Td) 11/12/2023 Allergies as of 7/21/2017  Review Complete On: 7/21/2017 By: Carlito Yeh NP Severity Noted Reaction Type Reactions Sulfa (Sulfonamide Antibiotics) High 03/04/2011   Side Effect Anaphylaxis, Other (comments) Difficulty breathing/high fever/delusional    
 Aleve [Naproxen Sodium]  09/30/2013    Nausea Only Codeine  03/04/2011   Side Effect Nausea Only Ibuprofen  03/04/2011   Side Effect Hives Lactose  02/23/2016   Intolerance Other (comments) Gas Penicillin G  03/04/2011   Side Effect Hives Current Immunizations  Reviewed on 7/11/2016 Name Date Influenza Vaccine 11/12/2013 Influenza Vaccine (Quad) PF 10/13/2015 Influenza Vaccine (Whole Virus) 3/1/2013 Influenza Vaccine PF 10/23/2014 Influenza Vaccine Split 11/2/2011 Pneumococcal Polysaccharide (PPSV-23) 3/11/2015 Tdap 11/12/2013 Not reviewed this visit You Were Diagnosed With   
  
 Codes Comments Morbid obesity due to excess calories ([address]    -  Primary ICD-10-CM: E66.01 
ICD-9-CM: 278.01 Intestinal malabsorption, unspecified type     ICD-10-CM: K90.9 ICD-9-CM: 579.9 Vitamin B12 deficiency     ICD-10-CM: E53.8 ICD-9-CM: 266.2 Vitamin D deficiency     ICD-10-CM: E55.9 ICD-9-CM: 268.9 Iron deficiency anemia, unspecified iron deficiency anemia type     ICD-10-CM: D50.9 ICD-9-CM: 280.9 Vitamin B1 deficiency     ICD-10-CM: E51.9 ICD-9-CM: 265.1 BMI 27.0-27.9,adult     ICD-10-CM: Q61.55 ICD-9-CM: V85.23 Vitals BP Pulse Temp Resp Height(growth percentile) Weight(growth percentile) 132/74 (BP 1 Location: Left arm, BP Patient Position: Sitting) 71 98.4 °F (36.9 °C) (Oral) 18 5' 3\" (1.6 m) 158 lb (71.7 kg) LMP SpO2 BMI OB Status Smoking Status 01/29/2011 99% 27.99 kg/m2 Hysterectomy Former Smoker BMI and BSA Data Body Mass Index Body Surface Area  
 27.99 kg/m 2 1.79 m 2 Preferred Pharmacy Pharmacy Name Phone [id number]- Matrina Awad, [address] [phone] Your Updated Medication List  
  
   
This list is accurate as of: 7/21/17 10:29 AM.  Always use your most recent med list.  
  
  
  
  
 ascorbic acid (vitamin C) 250 mg tablet Commonly known as:  VITAMIN C Take 500 mg by mouth two (2) times a day. Benefiber Clear 3 gram/3.5 gram Powd Generic drug:  wheat dextrin Take  by mouth daily. 2 Teaspoons daily AT 7AM AND 5PM  
  
 buPROPion  mg SR tablet Commonly known as:  WELLBUTRIN SR  
TAKE 1 TABLET BY MOUTH EVERY DAY  
  
 busPIRone 5 mg tablet Commonly known as:  BUSPAR Take 10 mg by mouth as needed. butalbital-acetaminophen-caffeine -40 mg per tablet Commonly known as:  Lexie Pope Take 1 Tab by mouth every six (6) hours as needed for Pain. Max Daily Amount: 4 Tabs. calcium citrate-vitamin d3 315-200 mg-unit Tab Commonly known as:  CITRACAL+D Take 2 Tabs by mouth two (2) times daily (with meals). Indications: Pt states, \"medication has been change to take at noon and dinner. \"  
  
 cetirizine 10 mg tablet Commonly known as:  ZYRTEC  
 Take  by mouth nightly. CULTURELLE PO Take 1 Tab by mouth daily. cyanocobalamin 1,000 mcg tablet Take 5,000 mcg by mouth daily. cyclobenzaprine 10 mg tablet Commonly known as:  FLEXERIL  
TAKE 1 TABLET BY MOUTH 3 TIMES A DAY AS NEEDED MUSCLE SPASM * escitalopram oxalate 20 mg tablet Commonly known as:  Kvng Goes TAKE 1 TAB BY MOUTH DAILY. * escitalopram oxalate 10 mg tablet Commonly known as:  Kvng Goes Take 1 Tab by mouth daily. famotidine 40 mg tablet Commonly known as:  PEPCID Take 1 Tab by mouth two (2) times a day. Indications: gastroesophageal reflux disease FLINTSTONES COMPLETE Chew Generic drug:  pediatric multivitamin no.76 Take 1 Tab by mouth daily. FLONASE 50 mcg/actuation nasal spray Generic drug:  fluticasone 2 Sprays by Both Nostrils route nightly as needed for Rhinitis (as needed). gabapentin 100 mg capsule Commonly known as:  NEURONTIN Take  by mouth three (3) times daily. 1 In am, 1 in the evening, 2 at bedtime. hyoscyamine SL 0.125 mg SL tablet Commonly known as:  LEVSIN/SL TAKE 1 TAB BY SUBLINGUAL ROUTE EVERY FOUR (4) HOURS AS NEEDED FOR CRAMPING.  
  
 ketoconazole 2 % topical cream  
Commonly known as:  NIZORAL Apply  to affected area daily. magnesium 250 mg Tab Take 500 mg by mouth daily. MIRALAX 17 gram packet Generic drug:  polyethylene glycol Take 17 g by mouth daily. ondansetron 4 mg disintegrating tablet Commonly known as:  ZOFRAN ODT  
TAKE 1 TABLET BY MOUTH EVERY 8 HOURS AS NEEDED FOR NAUSEA  
  
 OTHER  
C-PAP Machine SYNTHROID 175 mcg tablet Generic drug:  levothyroxine TAKE 1 TABLET BY MOUTH EVERY DAY  
  
 SYSTANE (PROPYLENE GLYCOL) 0.4-0.3 % Drop Generic drug:  peg 400-propylene glycol Administer 1 Drop to both eyes as needed. TYLENOL EXTRA STRENGTH 500 mg tablet Generic drug:  acetaminophen Take 1,000 mg by mouth every six (6) hours as needed for Pain. VITAMIN D3 5,000 unit Tab tablet Generic drug:  cholecalciferol (VITAMIN D3) Take 1,000 Units by mouth two (2) times a day. LIQUID SOFT GEL * Notice: This list has 2 medication(s) that are the same as other medications prescribed for you. Read the directions carefully, and ask your doctor or other care provider to review them with you. We Performed the Following CBC W/O DIFF [77897 CPT(R)] IRON PROFILE [id number] CPT(R)] PREALBUMIN [25777 CPT(R)] PTH INTACT [20211 CPT(R)] VITAMIN B1, WHOLE BLOOD [id number] CPT(R)] VITAMIN B12 & FOLATE [87487 CPT(R)] VITAMIN D, 25 HYDROXY [id number] CPT(R)] Patient Instructions Abdominal Hernia Repair: Before Your Surgery What is abdominal hernia repair surgery? An abdominal hernia repair is a type of surgery. It fixes a problem called a hernia. A hernia is a bulge under the skin in your belly. It happens when you have a weak spot in your belly muscles and a piece of your intestines or tissues pokes through your muscles. This can cause pain. You may notice the pain most when you lift something heavy. You can have a hernia near your belly button. Or it may be in a scar from an earlier surgery. To fix it, the doctor will do one of two kinds of surgery. In open surgery, the doctor makes one cut near the hernia. This cut is called an incision. In laparoscopic surgery, the doctor makes several very small incisions and uses a thin, lighted scope and small tools. In either type of surgery, the doctor pushes the bulge back in place, if needed. Then the doctor sews the healthy tissue back together. Often the doctor patches the weak spot with a piece of material. 
Laparoscopic surgery leaves several small scars. Open surgery leaves one long scar. The scars fade with time. You will probably need to take 1 to 2 weeks off from work.  But if your job requires heavy lifting or other physical work, you may need to take 4 to 6 weeks off. Follow-up care is a key part of your treatment and safety. Be sure to make and go to all appointments, and call your doctor if you are having problems. It's also a good idea to know your test results and keep a list of the medicines you take. What happens before surgery? Surgery can be stressful. This information will help you understand what you can expect. And it will help you safely prepare for surgery. Preparing for surgery · Understand exactly what surgery is planned, along with the risks, benefits, and other options. · Tell your doctors ALL the medicines, vitamins, supplements, and herbal remedies you take. Some of these can increase the risk of bleeding or interact with anesthesia. · If you take blood thinners, such as warfarin (Coumadin), clopidogrel (Plavix), or aspirin, be sure to talk to your doctor. He or she will tell you if you should stop taking these medicines before your surgery. Make sure that you understand exactly what your doctor wants you to do. · Your doctor will tell you which medicines to take or stop before your surgery. You may need to stop taking certain medicines a week or more before surgery. So talk to your doctor as soon as you can. · If you have an advance directive, let your doctor know. It may include a living will and a durable power of  for health care. Bring a copy to the hospital. If you don't have one, you may want to prepare one. It lets your doctor and loved ones know your health care wishes. Doctors advise that everyone prepare these papers before any type of surgery or procedure. What happens on the day of surgery? · Follow the instructions exactly about when to stop eating and drinking. If you don't, your surgery may be canceled. If your doctor told you to take your medicines on the day of surgery, take them with only a sip of water. · Take a bath or shower before you come in for your surgery. Do not apply lotions, perfumes, deodorants, or nail polish. · Do not shave the surgical site yourself. · Take off all jewelry and piercings. And take out contact lenses, if you wear them. At the hospital or surgery center · Bring a picture ID. · The area for surgery is often marked to make sure there are no errors. · You will be kept comfortable and safe by your anesthesia provider. You will be asleep during the surgery. · The surgery will take 30 minutes to 2 hours. It depends on how large the hernia is and where it is. Going home · Be sure you have someone to drive you home. Anesthesia and pain medicine make it unsafe for you to drive. · You will be given more specific instructions about recovering from your surgery. They will cover things like diet, wound care, follow-up care, driving, and getting back to your normal routine. When should you call your doctor? · You have questions or concerns. · You don't understand how to prepare for your surgery. · You become ill before the surgery (such as fever, flu, or a cold). · You need to reschedule or have changed your mind about having the surgery. Where can you learn more? Go to http://josee-gutierrez.info/. Enter U288 in the search box to learn more about \"Abdominal Hernia Repair: Before Your Surgery. \" Current as of: August 9, 2016 Content Version: 11.3 © 8493-1240 Compendium. Care instructions adapted under license by [employer] (which disclaims liability or warranty for this information). If you have questions about a medical condition or this instruction, always ask your healthcare professional. [place] [address] any warranty or liability for your use of this information. Introducing [facility]! Dear John Herrmann: 
Thank you for requesting a Gema account.   Our records indicate that you already have an active CinemaKi account. You can access your account anytime at [email or web address] Did you know that you can access your hospital and ER discharge instructions at any time in CinemaKi? You can also review all of your test results from your hospital stay or ER visit. Additional Information If you have questions, please visit the Frequently Asked Questions section of the CinemaKi website at [email or web address] Remember, CinemaKi is NOT to be used for urgent needs. For medical emergencies, dial 911. Now available from your iPhone and Android! Please provide this summary of care documentation to your next provider. Your primary care clinician is listed as Luther Weiss. If you have any questions after today's visit, please call [phone].

## 2017-07-21 NOTE — PROGRESS NOTES
1. Have you been to the ER, urgent care clinic since your last visit? Hospitalized since your last visit? no    2. Have you seen or consulted any other health care providers outside of the [address] [place] [address] since your last visit? Include any pap smears or colon screening.  no

## 2017-07-21 NOTE — MR AVS SNAPSHOT
Visit Information Date & Time Provider Department Dept. Phone Encounter #  
 7/21/2017 10:40 AM Abida Nance MD [place] [address] [phone] [id number] Your Appointments 11/6/2017  8:15 AM  
ROUTINE CARE with Mendel Councilman, MD  
[facility]) Appt Note: 6 mo thyroid; 6 mo thyroid [address] [facility] [address] [address]  
[phone]  
  
   
 [address] [facility] [address] Upcoming Health Maintenance Date Due  
 FOOT EXAM Q1 5/31/1979 MICROALBUMIN Q1 10/13/2016 EYE EXAM RETINAL OR DILATED Q1 2/8/2017 HEMOGLOBIN A1C Q6M 5/2/2017 LIPID PANEL Q1 6/27/2017 INFLUENZA AGE 9 TO ADULT 8/1/2017 DTaP/Tdap/Td series (2 - Td) 11/12/2023 Allergies as of 7/21/2017  Review Complete On: 7/21/2017 By: Abida Nance MD  
  
 Severity Noted Reaction Type Reactions Sulfa (Sulfonamide Antibiotics) High 03/04/2011   Side Effect Anaphylaxis, Other (comments) Difficulty breathing/high fever/delusional    
 Aleve [Naproxen Sodium]  09/30/2013    Nausea Only Codeine  03/04/2011   Side Effect Nausea Only Ibuprofen  03/04/2011   Side Effect Hives Lactose  02/23/2016   Intolerance Other (comments) Gas Penicillin G  03/04/2011   Side Effect Hives Current Immunizations  Reviewed on 7/11/2016 Name Date Influenza Vaccine 11/12/2013 Influenza Vaccine (Quad) PF 10/13/2015 Influenza Vaccine (Whole Virus) 3/1/2013 Influenza Vaccine PF 10/23/2014 Influenza Vaccine Split 11/2/2011 Pneumococcal Polysaccharide (PPSV-23) 3/11/2015 Tdap 11/12/2013 Not reviewed this visit You Were Diagnosed With   
  
 Codes Comments Ventral incisional hernia without obstruction or gangrene    -  Primary ICD-10-CM: X53.7 ICD-9-CM: 553.21 Hx of hysterectomy     ICD-10-CM: Z90.710 ICD-9-CM: V88.01   
 Hx of gastric bypass     ICD-10-CM: Z98.890 ICD-9-CM: V45.86 Vitals BP Pulse Temp Resp Height(growth percentile) Weight(growth percentile) 132/74 71 98.4 °F (36.9 °C) (Oral) 18 5' 3\" (1.6 m) 158 lb (71.7 kg) LMP SpO2 BMI OB Status Smoking Status 01/29/2011 99% 27.99 kg/m2 Hysterectomy Former Smoker Vitals History BMI and BSA Data Body Mass Index Body Surface Area  
 27.99 kg/m 2 1.79 m 2 Preferred Pharmacy Pharmacy Name Phone [id number] [phone] Your Updated Medication List  
  
   
This list is accurate as of: 7/21/17 11:45 AM.  Always use your most recent med list.  
  
  
  
  
 ascorbic acid (vitamin C) 250 mg tablet Commonly known as:  VITAMIN C Take 500 mg by mouth two (2) times a day. Benefiber Clear 3 gram/3.5 gram Powd Generic drug:  wheat dextrin Take  by mouth daily. 2 Teaspoons daily AT 7AM AND 5PM  
  
 buPROPion  mg SR tablet Commonly known as:  WELLBUTRIN SR  
TAKE 1 TABLET BY MOUTH EVERY DAY  
  
 busPIRone 5 mg tablet Commonly known as:  BUSPAR Take 10 mg by mouth as needed. butalbital-acetaminophen-caffeine -40 mg per tablet Commonly known as:  Lindalee Stack Take 1 Tab by mouth every six (6) hours as needed for Pain. Max Daily Amount: 4 Tabs. calcium citrate-vitamin d3 315-200 mg-unit Tab Commonly known as:  CITRACAL+D Take 2 Tabs by mouth two (2) times daily (with meals). Indications: Pt states, \"medication has been change to take at noon and dinner. \"  
  
 cetirizine 10 mg tablet Commonly known as:  ZYRTEC Take  by mouth nightly. CULTURELLE PO Take 1 Tab by mouth daily. cyanocobalamin 1,000 mcg tablet Take 5,000 mcg by mouth daily. cyclobenzaprine 10 mg tablet Commonly known as:  FLEXERIL  
TAKE 1 TABLET BY MOUTH 3 TIMES A DAY AS NEEDED MUSCLE SPASM * escitalopram oxalate 20 mg tablet Commonly known as:  Nanette Citron TAKE 1 TAB BY MOUTH DAILY. * escitalopram oxalate 10 mg tablet Commonly known as:  Nanette Citron Take 1 Tab by mouth daily. famotidine 40 mg tablet Commonly known as:  PEPCID Take 1 Tab by mouth two (2) times a day. Indications: gastroesophageal reflux disease FLINTSTONES COMPLETE Chew Generic drug:  pediatric multivitamin no.76 Take 1 Tab by mouth daily. FLONASE 50 mcg/actuation nasal spray Generic drug:  fluticasone 2 Sprays by Both Nostrils route nightly as needed for Rhinitis (as needed). gabapentin 100 mg capsule Commonly known as:  NEURONTIN Take  by mouth three (3) times daily. 1 In am, 1 in the evening, 2 at bedtime. hyoscyamine SL 0.125 mg SL tablet Commonly known as:  LEVSIN/SL TAKE 1 TAB BY SUBLINGUAL ROUTE EVERY FOUR (4) HOURS AS NEEDED FOR CRAMPING.  
  
 ketoconazole 2 % topical cream  
Commonly known as:  NIZORAL Apply  to affected area daily. magnesium 250 mg Tab Take 500 mg by mouth daily. MIRALAX 17 gram packet Generic drug:  polyethylene glycol Take 17 g by mouth daily. ondansetron 4 mg disintegrating tablet Commonly known as:  ZOFRAN ODT  
TAKE 1 TABLET BY MOUTH EVERY 8 HOURS AS NEEDED FOR NAUSEA  
  
 OTHER  
C-PAP Machine SYNTHROID 175 mcg tablet Generic drug:  levothyroxine TAKE 1 TABLET BY MOUTH EVERY DAY  
  
 SYSTANE (PROPYLENE GLYCOL) 0.4-0.3 % Drop Generic drug:  peg 400-propylene glycol Administer 1 Drop to both eyes as needed. TYLENOL EXTRA STRENGTH 500 mg tablet Generic drug:  acetaminophen Take 1,000 mg by mouth every six (6) hours as needed for Pain. VITAMIN D3 5,000 unit Tab tablet Generic drug:  cholecalciferol (VITAMIN D3) Take 1,000 Units by mouth two (2) times a day. LIQUID SOFT GEL * Notice:   This list has 2 medication(s) that are the same as other medications prescribed for you. Read the directions carefully, and ask your doctor or other care provider to review them with you. Introducing [address]! Dear Valeria Blanton: 
Thank you for requesting a [email or web address] account. Our records indicate that you already have an active [email or web address] account. You can access your account anytime at [email or web address] Did you know that you can access your hospital and ER discharge instructions at any time in [email or web address]? You can also review all of your test results from your hospital stay or ER visit. Additional Information If you have questions, please visit the Frequently Asked Questions section of the [email or web address] website at [email or web address] Remember, [email or web address] is NOT to be used for urgent needs. For medical emergencies, dial 911. Now available from your iPhone and Android! Please provide this summary of care documentation to your next provider. Your primary care clinician is listed as Joel Godinez. If you have any questions after today's visit, please call [phone].

## 2017-07-21 NOTE — PROGRESS NOTES
Office Visit     Jennifer Acuna is a 49 y.o. female who is 1 year S/P RYGB and 3 months S/P laparoscopic cholecystectomy. She has lost ~90 lbs since undergoing gastric bypass and her pre-operative BMI was 44 kg/(m^2). Pt denies experiencing any lingering RUQ pain since the recent gallbladder removal.   Pt presents today for evaluation of incisional hernia. Symptoms were first noted 2 years ago, but it was decided that undergoing weight loss surgery prior to hernia repair would decrease the risk of recurrence. Pain is dull and intermittent. The mass is reducible. She does not have a history of incarceration or obstruction. Patient does not have a history of previous hernia surgery. Objective:     Visit Vitals    /74    Pulse 71    Temp 98.4 °F (36.9 °C) (Oral)    Resp 18    Ht 5' 3\" (1.6 m)    Wt 158 lb (71.7 kg)    LMP 01/29/2011    SpO2 99%    BMI 27.99 kg/m2        Physical Exam:    General:  alert, cooperative, no distress, appears stated age   Eyes:  conjunctivae and sclerae normal    Throat & Neck: no erythema or exudates noted and neck supple and symmetrical; no palpable masses   Lungs:   clear to auscultation bilaterally   Heart:  Regular rate and rhythm   Abdomen:   abdomen is soft without significant tenderness, organomegaly or guarding,  three incisional hernias noted:   1. Lower midline incisional hernia with large reducible mass    2. hernia at lower end above pubic bone  3. a small hernia below umbilicus   both reducible; defects are difficult to palpate due to bodily habitus    Extremities: extremities normal, atraumatic, no edema   Skin: Normal.   Neuro: Mental status: Alert, oriented, thought content appropriate  Gait: Normal       Assessment:     Albert Malik was seen today for abdominal pain.     Diagnoses and all orders for this visit:    Ventral incisional hernia without obstruction or gangrene    Hx of hysterectomy    Hx of gastric bypass      Pt has been mildly symptomatic for incisional hernias for ~2 years. She is 1 year S/P RYGB and has lost a sufficient amount of weight to indicate a reduced risk of recurrence. The patient wishes to proceed with surgical intervention. Recommendation:     1. Discussed possibility of incarceration, strangulation, enlargement in size over time, and the risk of emergency surgery in the face of strangulation. Also discussed the risk of surgery including recurrence which can be up to 50% in the case of incisional or complex hernias, use of prosthetic materials (mesh) and the increased risk of infection, postoperative infection and the possible need for reoperation and removal of mesh if used, and the risks of general anesthetic. The patient understands the risks; any and all questions were answered to the patient's satisfaction. 2. Patient has elected to proceed with surgical treatment. Procedure will be scheduled: robotic-assisted repair of incisional hernias     3. A patient education booklet on hernia repair surgery and robotic surgery was given to the patient. 11:11 AM - 11:24 AM   Total time spent with patient, greater than 50% of the time was spent in counselin minutes. Signed By: Jena Mendez MD    2017      Cc:  Ro Edouard MD      Written by Swapna To, as dictated by Jena Mendez MD.

## 2017-07-21 NOTE — PROGRESS NOTES
1. Have you been to the ER, urgent care clinic since your last visit? Hospitalized since your last visit? No    2. Have you seen or consulted any other health care providers outside of the [address] [place] [address] since your last visit? Include any pap smears or colon screening.  No

## 2017-07-24 NOTE — H&P
Office Visit      Perla Sky is a 49 y.o. female who is 1 year S/P RYGB and 3 months S/P laparoscopic cholecystectomy. She has lost ~90 lbs since undergoing gastric bypass and her pre-operative BMI was 44 kg/(m^2). Pt denies experiencing any lingering RUQ pain since the recent gallbladder removal.   Pt presents today for evaluation of incisional hernia. Symptoms were first noted 2 years ago, but it was decided that undergoing weight loss surgery prior to hernia repair would decrease the risk of recurrence. Pain is dull and intermittent. The mass is reducible. She does not have a history of incarceration or obstruction. Patient does not have a history of previous hernia surgery.     Past Medical History:   Diagnosis Date    Arthritis     Autoimmune disease ([facility] [address]     FIBROMYALGIA    BMI 45.0-49.9, adult ([facility] [address]     Chronic pain     fibromyalgia    Depression     and anxiety    Diabetes ([facility] [address]     HX NIDDM (NO MEDS 4-12-17)    Elevated lipids     Fibromyalgia     GERD (gastroesophageal reflux disease) 2013    H/O seasonal allergies     Headache     Hypercholesterolemia     Hypertension     HX HTN, NO MEDS(4-12-17)    Migraines     Morbid obesity ([facility] [address]     LINO on CPAP     Thyroid disease     HYPOTHYROID      Past Surgical History:   Procedure Laterality Date    HX GYN      ECTOPIC PREGNANCE LEFT TUBE REMOVED    HX HYSTERECTOMY  2011    HX LAP CHOLECYSTECTOMY  04/20/2017    HX LAP GASTRIC BYPASS  7/11/16    by Dr Joellen Bella  2011    right ovarian tumor    HX TUBAL LIGATION        No current facility-administered medications for this encounter. Current Outpatient Prescriptions   Medication Sig    escitalopram oxalate (LEXAPRO) 10 mg tablet Take 1 Tab by mouth daily.  gabapentin (NEURONTIN) 100 mg capsule Take  by mouth three (3) times daily. 1 In am, 1 in the evening, 2 at bedtime.     hyoscyamine SL (LEVSIN/SL) 0.125 mg SL tablet TAKE 1 TAB BY SUBLINGUAL ROUTE EVERY FOUR (4) HOURS AS NEEDED FOR CRAMPING.  famotidine (PEPCID) 40 mg tablet Take 1 Tab by mouth two (2) times a day. Indications: gastroesophageal reflux disease    SYNTHROID 175 mcg tablet TAKE 1 TABLET BY MOUTH EVERY DAY    escitalopram oxalate (LEXAPRO) 20 mg tablet TAKE 1 TAB BY MOUTH DAILY.  ondansetron (ZOFRAN ODT) 4 mg disintegrating tablet TAKE 1 TABLET BY MOUTH EVERY 8 HOURS AS NEEDED FOR NAUSEA    cyanocobalamin 1,000 mcg tablet Take 5,000 mcg by mouth daily.  pediatric multivitamin no.76 (FLINTSTONES COMPLETE) chew Take 1 Tab by mouth daily.  acetaminophen (TYLENOL EXTRA STRENGTH) 500 mg tablet Take 1,000 mg by mouth every six (6) hours as needed for Pain.  polyethylene glycol (MIRALAX) 17 gram packet Take 17 g by mouth daily.  buPROPion SR (WELLBUTRIN SR) 100 mg SR tablet TAKE 1 TABLET BY MOUTH EVERY DAY    butalbital-acetaminophen-caffeine (FIORICET, ESGIC) -40 mg per tablet Take 1 Tab by mouth every six (6) hours as needed for Pain. Max Daily Amount: 4 Tabs.  cyclobenzaprine (FLEXERIL) 10 mg tablet TAKE 1 TABLET BY MOUTH 3 TIMES A DAY AS NEEDED MUSCLE SPASM (Patient taking differently: TAKE 1 TABLET BY MOUTH 3 TIMES A DAY AS NEEDED MUSCLE SPASM (TAKES EVERY BEDTIME))    busPIRone (BUSPAR) 5 mg tablet Take 10 mg by mouth as needed.  ascorbic acid, vitamin C, (VITAMIN C) 250 mg tablet Take 500 mg by mouth two (2) times a day.  LACTOBACILLUS RHAMNOSUS GG (CULTURELLE PO) Take 1 Tab by mouth daily.  magnesium 250 mg tab Take 500 mg by mouth daily.  calcium citrate-vitamin d3 (CITRACAL+D) 315-200 mg-unit tab Take 2 Tabs by mouth two (2) times daily (with meals). Indications: Pt states, \"medication has been change to take at noon and dinner. \"    cetirizine (ZYRTEC) 10 mg tablet Take  by mouth nightly.  wheat dextrin (BENEFIBER CLEAR) 3 gram/3.5 gram powd Take  by mouth daily.  2 Teaspoons daily AT 7AM AND 5PM    cholecalciferol, VITAMIN D3, (VITAMIN D3) 5,000 unit tab tablet Take 1,000 Units by mouth two (2) times a day. LIQUID SOFT GEL    ketoconazole (NIZORAL) 2 % topical cream Apply  to affected area daily. (Patient taking differently: Apply  to affected area as needed.)    fluticasone (FLONASE) 50 mcg/actuation nasal spray 2 Sprays by Both Nostrils route nightly as needed for Rhinitis (as needed).  peg 400-propylene glycol (SYSTANE) 0.4-0.3 % drop Administer 1 Drop to both eyes as needed.  OTHER C-PAP Machine      Allergies   Allergen Reactions    Sulfa (Sulfonamide Antibiotics) Anaphylaxis and Other (comments)     Difficulty breathing/high fever/delusional      Aleve [Naproxen Sodium] Nausea Only    Codeine Nausea Only    Ibuprofen Hives    Lactose Other (comments)     Gas     Penicillin G Hives          Objective:           Visit Vitals    /74    Pulse 71    Temp 98.4 °F (36.9 °C) (Oral)    Resp 18    Ht 5' 3\" (1.6 m)    Wt 158 lb (71.7 kg)    LMP 01/29/2011    SpO2 99%    BMI 27.99 kg/m2         Physical Exam:     General:  alert, cooperative, no distress, appears stated age   Eyes:  conjunctivae and sclerae normal    Throat & Neck: no erythema or exudates noted and neck supple and symmetrical; no palpable masses   Lungs:   clear to auscultation bilaterally   Heart:  Regular rate and rhythm   Abdomen:   abdomen is soft without significant tenderness, organomegaly or guarding,  three incisional hernias noted:   1.  Lower midline incisional hernia with large reducible mass     2. hernia at lower end above pubic bone  3. a small hernia below umbilicus   both reducible; defects are difficult to palpate due to bodily habitus    Extremities: extremities normal, atraumatic, no edema   Skin: Normal.   Neuro: Mental status: Alert, oriented, thought content appropriate  Gait: Normal         Assessment:      Karthik Shepard was seen today for abdominal pain.     Diagnoses and all orders for this visit:     Ventral incisional hernia without obstruction or gangrene     Hx of hysterectomy     Hx of gastric bypass        Pt has been mildly symptomatic for incisional hernias for ~2 years. She is 1 year S/P RYGB and has lost a sufficient amount of weight to indicate a reduced risk of recurrence. The patient wishes to proceed with surgical intervention. Recommendation:      1. Discussed possibility of incarceration, strangulation, enlargement in size over time, and the risk of emergency surgery in the face of strangulation. Also discussed the risk of surgery including recurrence which can be up to 50% in the case of incisional or complex hernias, use of prosthetic materials (mesh) and the increased risk of infection, postoperative infection and the possible need for reoperation and removal of mesh if used, and the risks of general anesthetic. The patient understands the risks; any and all questions were answered to the patient's satisfaction.      2. Patient has elected to proceed with surgical treatment. Procedure will be scheduled: robotic-assisted repair of incisional hernias      3.  A patient education booklet on hernia repair surgery and robotic surgery was given to the patient.         11:11 AM - 11:24 AM   Total time spent with patient, greater than 50% of the time was spent in counselin minutes.     Signed By: Hector Ramirez MD     2017

## 2017-07-26 LAB
25(OH)D3+25(OH)D2 SERPL-MCNC: 81.5 NG/ML (ref 30–100)
ERYTHROCYTE [DISTWIDTH] IN BLOOD BY AUTOMATED COUNT: 12.6 % (ref 12.3–15.4)
FOLATE SERPL-MCNC: >20 NG/ML
HCT VFR BLD AUTO: 43.9 % (ref 34–46.6)
HGB BLD-MCNC: 14.3 G/DL (ref 11.1–15.9)
IRON SATN MFR SERPL: 47 % (ref 15–55)
IRON SERPL-MCNC: 136 UG/DL (ref 27–159)
MCH RBC QN AUTO: 31.4 PG (ref 26.6–33)
MCHC RBC AUTO-ENTMCNC: 32.6 G/DL (ref 31.5–35.7)
MCV RBC AUTO: 97 FL (ref 79–97)
PLATELET # BLD AUTO: 252 X10E3/UL (ref 150–379)
PREALB SERPL-MCNC: 23 MG/DL (ref 12–34)
PTH-INTACT SERPL-MCNC: 32 PG/ML (ref 15–65)
RBC # BLD AUTO: 4.55 X10E6/UL (ref 3.77–5.28)
TIBC SERPL-MCNC: 287 UG/DL (ref 250–450)
UIBC SERPL-MCNC: 151 UG/DL (ref 131–425)
VIT B1 BLD-SCNC: 194.2 NMOL/L (ref 66.5–200)
VIT B12 SERPL-MCNC: 1884 PG/ML (ref 211–946)
WBC # BLD AUTO: 6.7 X10E3/UL (ref 3.4–10.8)

## 2017-07-26 NOTE — PROGRESS NOTES
Discussed with patient by phone. Labs within appropriate range. Patient to continue with current bariatric vitamin regiment.

## 2017-07-30 RX ORDER — GABAPENTIN 300 MG/1
CAPSULE ORAL
Qty: 120 CAP | Refills: 5 | Status: SHIPPED | OUTPATIENT
Start: 2017-07-30 | End: 2018-01-16 | Stop reason: SDUPTHER

## 2017-07-31 ENCOUNTER — HOSPITAL ENCOUNTER (OUTPATIENT)
Dept: PREADMISSION TESTING | Age: 47
Discharge: HOME | End: 2017-07-31
Payer: COMMERCIAL

## 2017-07-31 VITALS
HEART RATE: 73 BPM | TEMPERATURE: 98.2 F | WEIGHT: 160 LBS | HEIGHT: 63 IN | BODY MASS INDEX: 28.35 KG/M2 | SYSTOLIC BLOOD PRESSURE: 119 MMHG | DIASTOLIC BLOOD PRESSURE: 78 MMHG

## 2017-07-31 LAB
ALBUMIN SERPL BCP-MCNC: 3.4 G/DL (ref 3.5–5)
ALBUMIN/GLOB SERPL: 1.2 {RATIO} (ref 1.1–2.2)
ALP SERPL-CCNC: 100 U/L (ref 45–117)
ALT SERPL-CCNC: 71 U/L (ref 12–78)
ANION GAP BLD CALC-SCNC: 8 MMOL/L (ref 5–15)
APPEARANCE UR: CLEAR
AST SERPL W P-5'-P-CCNC: 42 U/L (ref 15–37)
BACTERIA URNS QL MICRO: NEGATIVE /HPF
BASOPHILS # BLD AUTO: 0 K/UL (ref 0–0.1)
BASOPHILS # BLD: 0 % (ref 0–1)
BILIRUB SERPL-MCNC: 0.2 MG/DL (ref 0.2–1)
BILIRUB UR QL: NEGATIVE
BUN SERPL-MCNC: 13 MG/DL (ref 6–20)
BUN/CREAT SERPL: 22 (ref 12–20)
CALCIUM SERPL-MCNC: 8.6 MG/DL (ref 8.5–10.1)
CAOX CRY URNS QL MICRO: ABNORMAL
CHLORIDE SERPL-SCNC: 104 MMOL/L (ref 97–108)
CO2 SERPL-SCNC: 30 MMOL/L (ref 21–32)
COLOR UR: ABNORMAL
CREAT SERPL-MCNC: 0.58 MG/DL (ref 0.55–1.02)
EOSINOPHIL # BLD: 0.2 K/UL (ref 0–0.4)
EOSINOPHIL NFR BLD: 4 % (ref 0–7)
EPITH CASTS URNS QL MICRO: ABNORMAL /LPF
ERYTHROCYTE [DISTWIDTH] IN BLOOD BY AUTOMATED COUNT: 12.3 % (ref 11.5–14.5)
GLOBULIN SER CALC-MCNC: 2.9 G/DL (ref 2–4)
GLUCOSE SERPL-MCNC: 86 MG/DL (ref 65–100)
GLUCOSE UR STRIP.AUTO-MCNC: NEGATIVE MG/DL
HCT VFR BLD AUTO: 41.5 % (ref 35–47)
HGB BLD-MCNC: 13.9 G/DL (ref 11.5–16)
HGB UR QL STRIP: NEGATIVE
KETONES UR QL STRIP.AUTO: NEGATIVE MG/DL
LEUKOCYTE ESTERASE UR QL STRIP.AUTO: NEGATIVE
LYMPHOCYTES # BLD AUTO: 35 % (ref 12–49)
LYMPHOCYTES # BLD: 1.9 K/UL (ref 0.8–3.5)
MCH RBC QN AUTO: 31.4 PG (ref 26–34)
MCHC RBC AUTO-ENTMCNC: 33.5 G/DL (ref 30–36.5)
MCV RBC AUTO: 93.7 FL (ref 80–99)
MONOCYTES # BLD: 0.4 K/UL (ref 0–1)
MONOCYTES NFR BLD AUTO: 7 % (ref 5–13)
NEUTS SEG # BLD: 2.9 K/UL (ref 1.8–8)
NEUTS SEG NFR BLD AUTO: 54 % (ref 32–75)
NITRITE UR QL STRIP.AUTO: NEGATIVE
PH UR STRIP: 6.5 [PH] (ref 5–8)
PLATELET # BLD AUTO: 207 K/UL (ref 150–400)
POTASSIUM SERPL-SCNC: 4.4 MMOL/L (ref 3.5–5.1)
PROT SERPL-MCNC: 6.3 G/DL (ref 6.4–8.2)
PROT UR STRIP-MCNC: NEGATIVE MG/DL
RBC # BLD AUTO: 4.43 M/UL (ref 3.8–5.2)
RBC #/AREA URNS HPF: ABNORMAL /HPF (ref 0–5)
SODIUM SERPL-SCNC: 142 MMOL/L (ref 136–145)
SP GR UR REFRACTOMETRY: 1.03 (ref 1–1.03)
UA: UC IF INDICATED,UAUC: ABNORMAL
UROBILINOGEN UR QL STRIP.AUTO: 0.2 EU/DL (ref 0.2–1)
WBC # BLD AUTO: 5.3 K/UL (ref 3.6–11)
WBC URNS QL MICRO: ABNORMAL /HPF (ref 0–4)

## 2017-08-02 ENCOUNTER — ANESTHESIA EVENT (OUTPATIENT)
Dept: SURGERY | Age: 47
End: 2017-08-02
Payer: COMMERCIAL

## 2017-08-03 ENCOUNTER — HOSPITAL ENCOUNTER (OUTPATIENT)
Age: 47
Setting detail: OBSERVATION
Discharge: HOME | End: 2017-08-04
Attending: SURGERY | Admitting: SURGERY
Payer: COMMERCIAL

## 2017-08-03 ENCOUNTER — ANESTHESIA (OUTPATIENT)
Dept: SURGERY | Age: 47
End: 2017-08-03
Payer: COMMERCIAL

## 2017-08-03 PROBLEM — K43.2 INCISIONAL HERNIA: Status: ACTIVE | Noted: 2017-08-03

## 2017-08-03 PROBLEM — K43.2 INCISIONAL HERNIA, WITHOUT OBSTRUCTION OR GANGRENE: Status: ACTIVE | Noted: 2017-08-03

## 2017-08-03 PROCEDURE — 74011250636 HC RX REV CODE- 250/636

## 2017-08-03 PROCEDURE — 74011250637 HC RX REV CODE- 250/637: Performed by: SURGERY

## 2017-08-03 PROCEDURE — 77030020782 HC GWN BAIR PAWS FLX 3M -B

## 2017-08-03 PROCEDURE — 77030008557 HC TBNG SMK EVAC STOR -B: Performed by: SURGERY

## 2017-08-03 PROCEDURE — 77030035048 HC TRCR ENDOSC OPTCL COVD -B: Performed by: SURGERY

## 2017-08-03 PROCEDURE — 77030035278 HC STPLR SEAL ENDOWR INTU -B: Performed by: SURGERY

## 2017-08-03 PROCEDURE — C1781 MESH (IMPLANTABLE): HCPCS | Performed by: SURGERY

## 2017-08-03 PROCEDURE — 99218 HC RM OBSERVATION: CPT

## 2017-08-03 PROCEDURE — 77030032490 HC SLV COMPR SCD KNE COVD -B: Performed by: SURGERY

## 2017-08-03 PROCEDURE — 77030016151 HC PROTCTR LNS DFOG COVD -B: Performed by: SURGERY

## 2017-08-03 PROCEDURE — 77030019908 HC STETH ESOPH SIMS -A: Performed by: ANESTHESIOLOGY

## 2017-08-03 PROCEDURE — 76060000040 HC ANESTHESIA 4.5 TO 5 HR: Performed by: SURGERY

## 2017-08-03 PROCEDURE — 77030025927 HC STPLR FIX SECURSTRP J&J -F: Performed by: SURGERY

## 2017-08-03 PROCEDURE — 77030034849: Performed by: SURGERY

## 2017-08-03 PROCEDURE — 77030013079 HC BLNKT BAIR HGGR 3M -A: Performed by: ANESTHESIOLOGY

## 2017-08-03 PROCEDURE — P9045 ALBUMIN (HUMAN), 5%, 250 ML: HCPCS

## 2017-08-03 PROCEDURE — 74011250636 HC RX REV CODE- 250/636: Performed by: ANESTHESIOLOGY

## 2017-08-03 PROCEDURE — 74011000250 HC RX REV CODE- 250: Performed by: ANESTHESIOLOGY

## 2017-08-03 PROCEDURE — 77030010507 HC ADH SKN DERMBND J&J -B: Performed by: SURGERY

## 2017-08-03 PROCEDURE — 77030018836 HC SOL IRR NACL ICUM -A: Performed by: SURGERY

## 2017-08-03 PROCEDURE — 77030002895 HC DEV VASC CLOSR COVD -B: Performed by: SURGERY

## 2017-08-03 PROCEDURE — 74011000250 HC RX REV CODE- 250

## 2017-08-03 PROCEDURE — 77030035489 HC REDUCR CANN ENDOWR INTU -C: Performed by: SURGERY

## 2017-08-03 PROCEDURE — 77030026438 HC STYL ET INTUB CARD -A: Performed by: ANESTHESIOLOGY

## 2017-08-03 PROCEDURE — 74011250636 HC RX REV CODE- 250/636: Performed by: SURGERY

## 2017-08-03 PROCEDURE — 77030008684 HC TU ET CUF COVD -B: Performed by: ANESTHESIOLOGY

## 2017-08-03 PROCEDURE — 74011000250 HC RX REV CODE- 250: Performed by: SURGERY

## 2017-08-03 PROCEDURE — 77030021454 HC SUT VLOC ABS COVD -B: Performed by: SURGERY

## 2017-08-03 PROCEDURE — 76210000000 HC OR PH I REC 2 TO 2.5 HR: Performed by: SURGERY

## 2017-08-03 PROCEDURE — 77030002996 HC SUT SLK J&J -A: Performed by: SURGERY

## 2017-08-03 PROCEDURE — 77030008756 HC TU IRR SUC STRY -B: Performed by: SURGERY

## 2017-08-03 PROCEDURE — 76010000881 HC OR TIME 4.5 TO 5HR INTENSV - TIER 2: Performed by: SURGERY

## 2017-08-03 PROCEDURE — 77030011640 HC PAD GRND REM COVD -A: Performed by: SURGERY

## 2017-08-03 DEVICE — MESH HERN W6XL6IN INGUINAL POLYPR SQ L PORE MFIL SFT KNIT: Type: IMPLANTABLE DEVICE | Site: ABDOMEN | Status: FUNCTIONAL

## 2017-08-03 DEVICE — MESH W/ECHO PS 4X6IN ELLIPSE -- VENTRALIGHT ORDER BY CA: Type: IMPLANTABLE DEVICE | Site: ABDOMEN | Status: FUNCTIONAL

## 2017-08-03 RX ORDER — KETOROLAC TROMETHAMINE 30 MG/ML
15 INJECTION, SOLUTION INTRAMUSCULAR; INTRAVENOUS
Status: DISCONTINUED | OUTPATIENT
Start: 2017-08-03 | End: 2017-08-04 | Stop reason: HOSPADM

## 2017-08-03 RX ORDER — KETOROLAC TROMETHAMINE 30 MG/ML
INJECTION, SOLUTION INTRAMUSCULAR; INTRAVENOUS AS NEEDED
Status: DISCONTINUED | OUTPATIENT
Start: 2017-08-03 | End: 2017-08-03 | Stop reason: HOSPADM

## 2017-08-03 RX ORDER — DEXAMETHASONE SODIUM PHOSPHATE 4 MG/ML
INJECTION, SOLUTION INTRA-ARTICULAR; INTRALESIONAL; INTRAMUSCULAR; INTRAVENOUS; SOFT TISSUE AS NEEDED
Status: DISCONTINUED | OUTPATIENT
Start: 2017-08-03 | End: 2017-08-03 | Stop reason: HOSPADM

## 2017-08-03 RX ORDER — ESCITALOPRAM OXALATE 10 MG/1
10 TABLET ORAL DAILY
Status: DISCONTINUED | OUTPATIENT
Start: 2017-08-04 | End: 2017-08-04 | Stop reason: HOSPADM

## 2017-08-03 RX ORDER — MIDAZOLAM HYDROCHLORIDE 1 MG/ML
0.5 INJECTION, SOLUTION INTRAMUSCULAR; INTRAVENOUS
Status: DISCONTINUED | OUTPATIENT
Start: 2017-08-03 | End: 2017-08-03 | Stop reason: HOSPADM

## 2017-08-03 RX ORDER — SODIUM CHLORIDE 0.9 % (FLUSH) 0.9 %
5-10 SYRINGE (ML) INJECTION EVERY 8 HOURS
Status: DISCONTINUED | OUTPATIENT
Start: 2017-08-03 | End: 2017-08-03 | Stop reason: HOSPADM

## 2017-08-03 RX ORDER — MIDAZOLAM HYDROCHLORIDE 1 MG/ML
1 INJECTION, SOLUTION INTRAMUSCULAR; INTRAVENOUS AS NEEDED
Status: DISCONTINUED | OUTPATIENT
Start: 2017-08-03 | End: 2017-08-03 | Stop reason: HOSPADM

## 2017-08-03 RX ORDER — SODIUM CHLORIDE, SODIUM LACTATE, POTASSIUM CHLORIDE, CALCIUM CHLORIDE 600; 310; 30; 20 MG/100ML; MG/100ML; MG/100ML; MG/100ML
25 INJECTION, SOLUTION INTRAVENOUS CONTINUOUS
Status: DISCONTINUED | OUTPATIENT
Start: 2017-08-03 | End: 2017-08-03 | Stop reason: HOSPADM

## 2017-08-03 RX ORDER — ALBUMIN HUMAN 50 G/1000ML
SOLUTION INTRAVENOUS AS NEEDED
Status: DISCONTINUED | OUTPATIENT
Start: 2017-08-03 | End: 2017-08-03 | Stop reason: HOSPADM

## 2017-08-03 RX ORDER — SODIUM CHLORIDE 0.9 % (FLUSH) 0.9 %
5-10 SYRINGE (ML) INJECTION AS NEEDED
Status: DISCONTINUED | OUTPATIENT
Start: 2017-08-03 | End: 2017-08-03 | Stop reason: HOSPADM

## 2017-08-03 RX ORDER — FENTANYL CITRATE 50 UG/ML
50 INJECTION, SOLUTION INTRAMUSCULAR; INTRAVENOUS AS NEEDED
Status: DISCONTINUED | OUTPATIENT
Start: 2017-08-03 | End: 2017-08-03 | Stop reason: HOSPADM

## 2017-08-03 RX ORDER — ROCURONIUM BROMIDE 10 MG/ML
INJECTION, SOLUTION INTRAVENOUS AS NEEDED
Status: DISCONTINUED | OUTPATIENT
Start: 2017-08-03 | End: 2017-08-03 | Stop reason: HOSPADM

## 2017-08-03 RX ORDER — FENTANYL CITRATE 50 UG/ML
INJECTION, SOLUTION INTRAMUSCULAR; INTRAVENOUS AS NEEDED
Status: DISCONTINUED | OUTPATIENT
Start: 2017-08-03 | End: 2017-08-03 | Stop reason: HOSPADM

## 2017-08-03 RX ORDER — HYDROCODONE BITARTRATE AND ACETAMINOPHEN 10; 325 MG/1; MG/1
1 TABLET ORAL
Status: DISCONTINUED | OUTPATIENT
Start: 2017-08-03 | End: 2017-08-04 | Stop reason: HOSPADM

## 2017-08-03 RX ORDER — GLYCOPYRROLATE 0.2 MG/ML
INJECTION INTRAMUSCULAR; INTRAVENOUS AS NEEDED
Status: DISCONTINUED | OUTPATIENT
Start: 2017-08-03 | End: 2017-08-03 | Stop reason: HOSPADM

## 2017-08-03 RX ORDER — BUPIVACAINE HYDROCHLORIDE AND EPINEPHRINE 5; 5 MG/ML; UG/ML
30 INJECTION, SOLUTION EPIDURAL; INTRACAUDAL; PERINEURAL ONCE
Status: COMPLETED | OUTPATIENT
Start: 2017-08-03 | End: 2017-08-03

## 2017-08-03 RX ORDER — HYDROCODONE BITARTRATE AND ACETAMINOPHEN 10; 325 MG/1; MG/1
1 TABLET ORAL
Qty: 28 TAB | Refills: 0 | Status: SHIPPED | OUTPATIENT
Start: 2017-08-03 | End: 2017-08-30 | Stop reason: ALTCHOICE

## 2017-08-03 RX ORDER — NALOXONE HYDROCHLORIDE 0.4 MG/ML
0.4 INJECTION, SOLUTION INTRAMUSCULAR; INTRAVENOUS; SUBCUTANEOUS AS NEEDED
Status: DISCONTINUED | OUTPATIENT
Start: 2017-08-03 | End: 2017-08-04 | Stop reason: HOSPADM

## 2017-08-03 RX ORDER — ONDANSETRON 2 MG/ML
INJECTION INTRAMUSCULAR; INTRAVENOUS AS NEEDED
Status: DISCONTINUED | OUTPATIENT
Start: 2017-08-03 | End: 2017-08-03 | Stop reason: HOSPADM

## 2017-08-03 RX ORDER — NEOSTIGMINE METHYLSULFATE 1 MG/ML
INJECTION INTRAVENOUS AS NEEDED
Status: DISCONTINUED | OUTPATIENT
Start: 2017-08-03 | End: 2017-08-03 | Stop reason: HOSPADM

## 2017-08-03 RX ORDER — ONDANSETRON 2 MG/ML
4 INJECTION INTRAMUSCULAR; INTRAVENOUS ONCE
Status: COMPLETED | OUTPATIENT
Start: 2017-08-03 | End: 2017-08-03

## 2017-08-03 RX ORDER — HYDROMORPHONE HYDROCHLORIDE 1 MG/ML
0.5 INJECTION, SOLUTION INTRAMUSCULAR; INTRAVENOUS; SUBCUTANEOUS
Status: DISCONTINUED | OUTPATIENT
Start: 2017-08-03 | End: 2017-08-04 | Stop reason: HOSPADM

## 2017-08-03 RX ORDER — PROPOFOL 10 MG/ML
INJECTION, EMULSION INTRAVENOUS AS NEEDED
Status: DISCONTINUED | OUTPATIENT
Start: 2017-08-03 | End: 2017-08-03 | Stop reason: HOSPADM

## 2017-08-03 RX ORDER — LIDOCAINE HYDROCHLORIDE 20 MG/ML
INJECTION, SOLUTION EPIDURAL; INFILTRATION; INTRACAUDAL; PERINEURAL AS NEEDED
Status: DISCONTINUED | OUTPATIENT
Start: 2017-08-03 | End: 2017-08-03 | Stop reason: HOSPADM

## 2017-08-03 RX ORDER — GABAPENTIN 300 MG/1
600 CAPSULE ORAL
Status: DISCONTINUED | OUTPATIENT
Start: 2017-08-03 | End: 2017-08-04 | Stop reason: HOSPADM

## 2017-08-03 RX ORDER — GABAPENTIN 300 MG/1
300 CAPSULE ORAL DAILY
Status: DISCONTINUED | OUTPATIENT
Start: 2017-08-04 | End: 2017-08-04 | Stop reason: HOSPADM

## 2017-08-03 RX ORDER — SODIUM CHLORIDE 0.9 % (FLUSH) 0.9 %
5-10 SYRINGE (ML) INJECTION EVERY 8 HOURS
Status: DISCONTINUED | OUTPATIENT
Start: 2017-08-03 | End: 2017-08-04 | Stop reason: HOSPADM

## 2017-08-03 RX ORDER — LIDOCAINE HYDROCHLORIDE 10 MG/ML
0.1 INJECTION, SOLUTION EPIDURAL; INFILTRATION; INTRACAUDAL; PERINEURAL AS NEEDED
Status: DISCONTINUED | OUTPATIENT
Start: 2017-08-03 | End: 2017-08-03 | Stop reason: HOSPADM

## 2017-08-03 RX ORDER — BUSPIRONE HYDROCHLORIDE 5 MG/1
5 TABLET ORAL
Status: DISCONTINUED | OUTPATIENT
Start: 2017-08-03 | End: 2017-08-04 | Stop reason: HOSPADM

## 2017-08-03 RX ORDER — SODIUM CHLORIDE 0.9 % (FLUSH) 0.9 %
5-10 SYRINGE (ML) INJECTION AS NEEDED
Status: DISCONTINUED | OUTPATIENT
Start: 2017-08-03 | End: 2017-08-04 | Stop reason: HOSPADM

## 2017-08-03 RX ORDER — GABAPENTIN 300 MG/1
300 CAPSULE ORAL
Status: DISCONTINUED | OUTPATIENT
Start: 2017-08-04 | End: 2017-08-04 | Stop reason: HOSPADM

## 2017-08-03 RX ORDER — HYDROMORPHONE HYDROCHLORIDE 1 MG/ML
0.2 INJECTION, SOLUTION INTRAMUSCULAR; INTRAVENOUS; SUBCUTANEOUS
Status: DISCONTINUED | OUTPATIENT
Start: 2017-08-03 | End: 2017-08-03 | Stop reason: HOSPADM

## 2017-08-03 RX ORDER — GABAPENTIN 300 MG/1
300 CAPSULE ORAL 2 TIMES DAILY
Status: DISCONTINUED | OUTPATIENT
Start: 2017-08-03 | End: 2017-08-03 | Stop reason: SDUPTHER

## 2017-08-03 RX ORDER — SODIUM CHLORIDE, SODIUM LACTATE, POTASSIUM CHLORIDE, CALCIUM CHLORIDE 600; 310; 30; 20 MG/100ML; MG/100ML; MG/100ML; MG/100ML
100 INJECTION, SOLUTION INTRAVENOUS CONTINUOUS
Status: DISCONTINUED | OUTPATIENT
Start: 2017-08-03 | End: 2017-08-03 | Stop reason: HOSPADM

## 2017-08-03 RX ORDER — SODIUM CHLORIDE, SODIUM LACTATE, POTASSIUM CHLORIDE, CALCIUM CHLORIDE 600; 310; 30; 20 MG/100ML; MG/100ML; MG/100ML; MG/100ML
INJECTION, SOLUTION INTRAVENOUS
Status: DISCONTINUED | OUTPATIENT
Start: 2017-08-03 | End: 2017-08-03 | Stop reason: HOSPADM

## 2017-08-03 RX ORDER — ONDANSETRON 2 MG/ML
4 INJECTION INTRAMUSCULAR; INTRAVENOUS
Status: DISCONTINUED | OUTPATIENT
Start: 2017-08-03 | End: 2017-08-04 | Stop reason: HOSPADM

## 2017-08-03 RX ORDER — CEFAZOLIN SODIUM IN 0.9 % NACL 2 G/50 ML
2 INTRAVENOUS SOLUTION, PIGGYBACK (ML) INTRAVENOUS ONCE
Status: COMPLETED | OUTPATIENT
Start: 2017-08-03 | End: 2017-08-03

## 2017-08-03 RX ORDER — DIPHENHYDRAMINE HYDROCHLORIDE 50 MG/ML
12.5 INJECTION, SOLUTION INTRAMUSCULAR; INTRAVENOUS AS NEEDED
Status: DISCONTINUED | OUTPATIENT
Start: 2017-08-03 | End: 2017-08-03 | Stop reason: HOSPADM

## 2017-08-03 RX ORDER — BUPROPION HYDROCHLORIDE 100 MG/1
100 TABLET, EXTENDED RELEASE ORAL DAILY
Status: DISCONTINUED | OUTPATIENT
Start: 2017-08-04 | End: 2017-08-04 | Stop reason: HOSPADM

## 2017-08-03 RX ORDER — FENTANYL CITRATE 50 UG/ML
25 INJECTION, SOLUTION INTRAMUSCULAR; INTRAVENOUS
Status: DISCONTINUED | OUTPATIENT
Start: 2017-08-03 | End: 2017-08-03 | Stop reason: HOSPADM

## 2017-08-03 RX ORDER — HYDROMORPHONE HYDROCHLORIDE 1 MG/ML
INJECTION, SOLUTION INTRAMUSCULAR; INTRAVENOUS; SUBCUTANEOUS AS NEEDED
Status: DISCONTINUED | OUTPATIENT
Start: 2017-08-03 | End: 2017-08-03 | Stop reason: HOSPADM

## 2017-08-03 RX ORDER — ONDANSETRON 2 MG/ML
INJECTION INTRAMUSCULAR; INTRAVENOUS
Status: COMPLETED
Start: 2017-08-03 | End: 2017-08-03

## 2017-08-03 RX ORDER — PHENYLEPHRINE HCL IN 0.9% NACL 0.4MG/10ML
SYRINGE (ML) INTRAVENOUS AS NEEDED
Status: DISCONTINUED | OUTPATIENT
Start: 2017-08-03 | End: 2017-08-03 | Stop reason: HOSPADM

## 2017-08-03 RX ORDER — SODIUM CHLORIDE, SODIUM LACTATE, POTASSIUM CHLORIDE, CALCIUM CHLORIDE 600; 310; 30; 20 MG/100ML; MG/100ML; MG/100ML; MG/100ML
125 INJECTION, SOLUTION INTRAVENOUS CONTINUOUS
Status: DISCONTINUED | OUTPATIENT
Start: 2017-08-03 | End: 2017-08-04 | Stop reason: HOSPADM

## 2017-08-03 RX ORDER — ROPIVACAINE HYDROCHLORIDE 5 MG/ML
150 INJECTION, SOLUTION EPIDURAL; INFILTRATION; PERINEURAL AS NEEDED
Status: DISCONTINUED | OUTPATIENT
Start: 2017-08-03 | End: 2017-08-03 | Stop reason: HOSPADM

## 2017-08-03 RX ORDER — MIDAZOLAM HYDROCHLORIDE 1 MG/ML
INJECTION, SOLUTION INTRAMUSCULAR; INTRAVENOUS AS NEEDED
Status: DISCONTINUED | OUTPATIENT
Start: 2017-08-03 | End: 2017-08-03 | Stop reason: HOSPADM

## 2017-08-03 RX ORDER — SUCCINYLCHOLINE CHLORIDE 20 MG/ML
INJECTION INTRAMUSCULAR; INTRAVENOUS AS NEEDED
Status: DISCONTINUED | OUTPATIENT
Start: 2017-08-03 | End: 2017-08-03 | Stop reason: HOSPADM

## 2017-08-03 RX ORDER — MORPHINE SULFATE 10 MG/ML
2 INJECTION, SOLUTION INTRAMUSCULAR; INTRAVENOUS
Status: DISCONTINUED | OUTPATIENT
Start: 2017-08-03 | End: 2017-08-03 | Stop reason: HOSPADM

## 2017-08-03 RX ORDER — FAMOTIDINE 20 MG/1
40 TABLET, FILM COATED ORAL 2 TIMES DAILY
Status: DISCONTINUED | OUTPATIENT
Start: 2017-08-03 | End: 2017-08-04 | Stop reason: HOSPADM

## 2017-08-03 RX ADMIN — Medication 80 MCG: at 08:46

## 2017-08-03 RX ADMIN — Medication 80 MCG: at 08:30

## 2017-08-03 RX ADMIN — KETOROLAC TROMETHAMINE 15 MG: 30 INJECTION, SOLUTION INTRAMUSCULAR at 22:22

## 2017-08-03 RX ADMIN — GABAPENTIN 600 MG: 300 CAPSULE ORAL at 21:21

## 2017-08-03 RX ADMIN — SUCCINYLCHOLINE CHLORIDE 120 MG: 20 INJECTION INTRAMUSCULAR; INTRAVENOUS at 08:05

## 2017-08-03 RX ADMIN — HYDROMORPHONE HYDROCHLORIDE 0.5 MG: 1 INJECTION, SOLUTION INTRAMUSCULAR; INTRAVENOUS; SUBCUTANEOUS at 12:55

## 2017-08-03 RX ADMIN — SODIUM CHLORIDE, SODIUM LACTATE, POTASSIUM CHLORIDE, AND CALCIUM CHLORIDE 100 ML/HR: 600; 310; 30; 20 INJECTION, SOLUTION INTRAVENOUS at 06:55

## 2017-08-03 RX ADMIN — Medication 10 ML: at 21:21

## 2017-08-03 RX ADMIN — ONDANSETRON 4 MG: 2 INJECTION INTRAMUSCULAR; INTRAVENOUS at 12:25

## 2017-08-03 RX ADMIN — DEXAMETHASONE SODIUM PHOSPHATE 4 MG: 4 INJECTION, SOLUTION INTRA-ARTICULAR; INTRALESIONAL; INTRAMUSCULAR; INTRAVENOUS; SOFT TISSUE at 08:12

## 2017-08-03 RX ADMIN — LIDOCAINE HYDROCHLORIDE 0.1 ML: 10 INJECTION, SOLUTION EPIDURAL; INFILTRATION; INTRACAUDAL; PERINEURAL at 06:55

## 2017-08-03 RX ADMIN — FENTANYL CITRATE 25 MCG: 50 INJECTION, SOLUTION INTRAMUSCULAR; INTRAVENOUS at 15:08

## 2017-08-03 RX ADMIN — FAMOTIDINE 40 MG: 20 TABLET, FILM COATED ORAL at 17:04

## 2017-08-03 RX ADMIN — CEFAZOLIN 2 G: 1 INJECTION, POWDER, FOR SOLUTION INTRAMUSCULAR; INTRAVENOUS; PARENTERAL at 12:15

## 2017-08-03 RX ADMIN — ROCURONIUM BROMIDE 20 MG: 10 INJECTION, SOLUTION INTRAVENOUS at 08:45

## 2017-08-03 RX ADMIN — ONDANSETRON 4 MG: 2 INJECTION INTRAMUSCULAR; INTRAVENOUS at 14:06

## 2017-08-03 RX ADMIN — FENTANYL CITRATE 50 MCG: 50 INJECTION, SOLUTION INTRAMUSCULAR; INTRAVENOUS at 12:55

## 2017-08-03 RX ADMIN — ONDANSETRON 4 MG: 2 INJECTION INTRAMUSCULAR; INTRAVENOUS at 16:33

## 2017-08-03 RX ADMIN — ROCURONIUM BROMIDE 20 MG: 10 INJECTION, SOLUTION INTRAVENOUS at 09:17

## 2017-08-03 RX ADMIN — Medication 80 MCG: at 08:41

## 2017-08-03 RX ADMIN — PROPOFOL 175 MG: 10 INJECTION, EMULSION INTRAVENOUS at 08:05

## 2017-08-03 RX ADMIN — ROCURONIUM BROMIDE 10 MG: 10 INJECTION, SOLUTION INTRAVENOUS at 10:02

## 2017-08-03 RX ADMIN — CEFAZOLIN 2 G: 1 INJECTION, POWDER, FOR SOLUTION INTRAMUSCULAR; INTRAVENOUS; PARENTERAL at 08:15

## 2017-08-03 RX ADMIN — SODIUM CHLORIDE, SODIUM LACTATE, POTASSIUM CHLORIDE, AND CALCIUM CHLORIDE 125 ML/HR: 600; 310; 30; 20 INJECTION, SOLUTION INTRAVENOUS at 22:24

## 2017-08-03 RX ADMIN — HYDROCODONE BITARTRATE AND ACETAMINOPHEN 1 TABLET: 10; 325 TABLET ORAL at 17:03

## 2017-08-03 RX ADMIN — NEOSTIGMINE METHYLSULFATE 2 MG: 1 INJECTION INTRAVENOUS at 12:25

## 2017-08-03 RX ADMIN — Medication 80 MCG: at 08:35

## 2017-08-03 RX ADMIN — ALBUMIN HUMAN 250 ML: 50 SOLUTION INTRAVENOUS at 08:43

## 2017-08-03 RX ADMIN — HYDROCODONE BITARTRATE AND ACETAMINOPHEN 1 TABLET: 10; 325 TABLET ORAL at 21:21

## 2017-08-03 RX ADMIN — FENTANYL CITRATE 100 MCG: 50 INJECTION, SOLUTION INTRAMUSCULAR; INTRAVENOUS at 08:05

## 2017-08-03 RX ADMIN — Medication 80 MCG: at 08:43

## 2017-08-03 RX ADMIN — SODIUM CHLORIDE, SODIUM LACTATE, POTASSIUM CHLORIDE, AND CALCIUM CHLORIDE 25 ML/HR: 600; 310; 30; 20 INJECTION, SOLUTION INTRAVENOUS at 07:05

## 2017-08-03 RX ADMIN — GLYCOPYRROLATE 0.4 MG: 0.2 INJECTION INTRAMUSCULAR; INTRAVENOUS at 12:25

## 2017-08-03 RX ADMIN — FENTANYL CITRATE 25 MCG: 50 INJECTION, SOLUTION INTRAMUSCULAR; INTRAVENOUS at 14:56

## 2017-08-03 RX ADMIN — KETOROLAC TROMETHAMINE 30 MG: 30 INJECTION, SOLUTION INTRAMUSCULAR; INTRAVENOUS at 12:28

## 2017-08-03 RX ADMIN — LIDOCAINE HYDROCHLORIDE 0.1 ML: 10 INJECTION, SOLUTION EPIDURAL; INFILTRATION; INTRACAUDAL; PERINEURAL at 07:05

## 2017-08-03 RX ADMIN — MIDAZOLAM HYDROCHLORIDE 2 MG: 1 INJECTION, SOLUTION INTRAMUSCULAR; INTRAVENOUS at 07:55

## 2017-08-03 RX ADMIN — SODIUM CHLORIDE, SODIUM LACTATE, POTASSIUM CHLORIDE, CALCIUM CHLORIDE: 600; 310; 30; 20 INJECTION, SOLUTION INTRAVENOUS at 07:54

## 2017-08-03 RX ADMIN — ROCURONIUM BROMIDE 30 MG: 10 INJECTION, SOLUTION INTRAVENOUS at 08:19

## 2017-08-03 RX ADMIN — LIDOCAINE HYDROCHLORIDE 100 MG: 20 INJECTION, SOLUTION EPIDURAL; INFILTRATION; INTRACAUDAL; PERINEURAL at 08:05

## 2017-08-03 RX ADMIN — SODIUM CHLORIDE, SODIUM LACTATE, POTASSIUM CHLORIDE, CALCIUM CHLORIDE: 600; 310; 30; 20 INJECTION, SOLUTION INTRAVENOUS at 12:05

## 2017-08-03 RX ADMIN — SODIUM CHLORIDE, SODIUM LACTATE, POTASSIUM CHLORIDE, AND CALCIUM CHLORIDE: 600; 310; 30; 20 INJECTION, SOLUTION INTRAVENOUS at 07:53

## 2017-08-03 RX ADMIN — HYDROMORPHONE HYDROCHLORIDE 0.5 MG: 1 INJECTION, SOLUTION INTRAMUSCULAR; INTRAVENOUS; SUBCUTANEOUS at 12:19

## 2017-08-03 RX ADMIN — ROCURONIUM BROMIDE 10 MG: 10 INJECTION, SOLUTION INTRAVENOUS at 08:05

## 2017-08-03 NOTE — INTERVAL H&P NOTE
H&P Update:  Roma Gastelum was seen and examined. History and physical has been reviewed. The patient has been examined.  There have been no significant clinical changes since the completion of the originally dated History and Physical.    Signed By: Aaliyah Daniel MD     August 3, 2017 7:16 AM

## 2017-08-03 NOTE — PERIOP NOTES
TRANSFER - OUT REPORT:    Verbal report given to Joaquina [facility] (name[facility] routine post - op       Report consisted of patients Situation, Background, Assessment and   Recommendations(SBAR). Time Pre op antibiotic [id number] and 9260  Anesthesia Stop time: 0746  Uribe Present on Transfer to floor:n  Order for Uribe on Chart:n    Information from the following report(s) SBAR, OR Summary, Intake/Output, MAR and Accordion was reviewed with the receiving nurse. Opportunity for questions and clarification was provided. Is the patient on 02? YES       L/Min 2       Other     Is the patient on a monitor? NO    Is the nurse transporting with the patient? NO    Surgical Waiting Area notified of patient's transfer from PACU?  YES      The following personal items collected during your admission accompanied patient upon transfer:   Dental Appliance:    Vision:    Hearing Aid:    Jewelry:    Clothing: Clothing:  (clothing bag, glasses to pacu)  Other Valuables:    Valuables sent to safe:        Clothes and glasses to floor with pt

## 2017-08-03 NOTE — BRIEF OP NOTE
BRIEF OPERATIVE NOTE    Date of Procedure: 8/3/2017   Preoperative Diagnosis: INCISIONAL HERNIAS  Postoperative Diagnosis: NCISIONAL HERNIAS    Procedure(s):  DAVINCI REPAIR OF INCISIONAL HERNIAS  Surgeon(s) and Role:     Raulito Nelson MD - Primary            Assistant Staff:       Surgical Staff:  Circ-1: Tiffanie Llamas RN  Circ-Relief: Tabatha Dougherty RN  Scrub Tech-1: Conor Rodríguez  Scrub RN-Relief: Noemi Yi RN  Surg Asst-1: [facility]  Event Time In   Incision Start 8394   Incision Close      Anesthesia: General   Estimated Blood Loss: 50 cc IV: LR 1900 cc  Urine: 800 cc  Specimens: * No specimens in log *   Findings: lower midline incisional hernia (10 cm x 11 cm), periumbilical incisional hernia (4 cm x 4 cm)   Lower preperitoneal repair, 15 cm x 15 cm soft mesh; periumbilical IPOM repair 64.1 cm x 24.7 cm  Complications: none  Implants:   Implant Name Type Inv.  Item Serial No.  Lot No. LRB No. Used Action   MESH HAIR SFT BARD [id number] --  - [id number]  MESH HAIR SFT BARD 6X6IN --   Jaret HAMPTON [id number] N/A 1 Implanted   MESH W/ECHO PS 4X6IN ELLIPSE -- VENTRALIGHT ORDER BY CA - [id number]   MESH W/ECHO PS 4X6IN ELLIPSE -- VENTRALIGHT ORDER BY AUBREY FRAGAOL [id number] N/A 1 Implanted

## 2017-08-03 NOTE — DISCHARGE INSTRUCTIONS
Patient Discharge Instructions    Chiki Sprague / [id number] : 1970    Admitted 8/3/2017 Discharged: 2017        LAPAROSCOPIC HERNIA SURGERY    FOLLOW-UP:  Please make an appointment with your physician in 10 - 14 day(s). Call your physician immediately if you have any fevers greater than 101.5, drainage from your wound that is not clear or looks infected, persistent bleeding, increasing abdominal pain, problems urinating, or persistent nausea/vomiting. You should be aware that you may have shoulder pain after surgery and that this will progressively go away. This is called 'referred pain' and is from the area of the diaphragm caused by gas that may be trapped under the diaphragm from laparoscopic surgery. This gas will progressively get reabsorbed by your body. For several weeks you may feel twinges or pulling in the hernia repair when you move. WOUND CARE INSTRUCTIONS:   You may shower at home. If clothing rubs against the wound or causes irritation and the wound is not draining you may cover it with a dry dressing during the daytime. Try to keep the wound dry and avoid ointments on the wound unless directed to do so. If the wound becomes bright red and painful or starts to drain infected material that is not clear, please contact your physician immediately. You should also call if you begin to drain fluid that is thin and greenish-brown from the wound and appears to look like bile. If the wound though is mildly pink and has a thick firm ridge underneath it, this is normal, and is referred to as a healing ridge. This will resolve over the next 4-6 weeks. DIET:  You may eat any foods that you can tolerate. It is a good idea to eat a high fiber diet and take in plenty of fluids to prevent constipation. If you do become constipated you may want to take a mild laxative or take ducolax tablets on a daily basis until your bowel habits are regular.   Constipation can be very uncomfortable, along with straining, after recent abdominal surgery. ACTIVITY:  You are encouraged to cough and deep breath or use your incentive spirometer if you were given one, every 15-30 minutes when awake. This will help prevent respiratory complications and low grade fevers post-operatively. You may want to hug a pillow when coughing and sneezing to add additional support to the surgical area(s) which will decrease pain during these times. · Rest when you feel tired. Getting enough sleep will help you recover. Sleep with your head up by using three or four pillows. You can also try to sleep with your head up in a recliner chair. Do not sleep on your side or stomach. · Try to walk each day. Start by walking a little more than you did the day before. Bit by bit, increase the amount you walk. Walking boosts blood flow and helps prevent pneumonia and constipation. · Put ice or a cold pack on the area of your hernia repair for 10 to 20 minutes at a time. Try to do this every 1 to 2 hours for the first 24 to 48 hours (when you are awake) or until the swelling goes down. Put a thin cloth between the ice and your skin. After that you may place a heating pad on the repair as needed for muscle pulling type pain. · Use the abdominal binder any time you are on your feet. · Avoid strenuous activities, such as biking, jogging, weight lifting, or aerobic exercise, for 4 week(s). Then resume as tolerated. · Avoid lifting anything that would make you strain. This may include heavy grocery bags and milk containers, a heavy briefcase or backpack, cat litter or dog food bags, a child, or a vacuum . · You may drive when you are no longer taking pain medicine and can quickly move your foot from the gas pedal to the brake. You must also be able to sit comfortably for a long period of time, even if you do not plan to go far. You might get caught in traffic.   · Most people are able to return to work within 1 to 2 weeks after surgery. · You may shower 24 hours after surgery. Pat the cut (incision) dry. Do not take a bath for the first 2 weeks, or until your doctor tells you it is okay. · Your doctor will tell you when you can have sex again. MEDICATIONS:  Try to take narcotic medications and anti-inflammatory medications, such as tylenol, ibuprofen, naprosyn, etc., with food. This will minimize stomach upset from the medication. Should you develop nausea and vomiting from the pain medication, or develop a rash, please discontinue the medication and contact your physician. You should not drive, make important decisions, or operate machinery when taking narcotic pain medication. · It is important that you take the medication exactly as they are prescribed. · Keep your medication in the bottles provided by the pharmacist and keep a list of the medication names, dosages, and times to be taken in your wallet. · Do not take other medications without consulting your doctor. You may resume Tylenol after you are not taking hydrocodone-acetaminophen    QUESTIONS:  Please feel free to call Dr. Mckeon [facility] office ([phone]) if you have any questions, and they will be glad to assist you. Follow-up with Dr. Lakesha Barnhart or NP in 2 week(s). Call the office to schedule your appointment. Information obtained by :    I understand that if any problems occur once I am at home I am to contact my physician. I understand and acknowledge receipt of the instructions indicated above.                                                                                                                                            Physician's or R.N.'s Signature                                                                  Date/Time                                                                                                                                              Patient or Representative Signature Date/Time

## 2017-08-03 NOTE — ANESTHESIA POSTPROCEDURE EVALUATION
Post-Anesthesia Evaluation and Assessment    Patient: Isaac Murray MRN: [id number]  SSN: [id number]    YOB: 1970  Age: 49 y.o. Sex: female       Cardiovascular Function/Vital Signs  Visit Vitals    /53    Pulse 83    Temp 36.7 °C (98 °F)    Resp (!) 7    Ht 5' 3\" (1.6 m)    Wt 72.6 kg (160 lb 0.9 oz)    SpO2 97%    BMI 28.35 kg/m2       Patient is status post general anesthesia for Procedure(s):  [address] with mesh. Nausea/Vomiting: None    Postoperative hydration reviewed and adequate. Pain:  Pain Scale 1: Numeric (0 - 10) (08/03/17 0631)  Pain Intensity 1: 0 (08/03/17 0631)   Managed    Neurological Status:   Neuro (WDL): Within Defined Limits (08/03/17 0636)   At baseline    Mental Status and Level of Consciousness: Arousable    Pulmonary Status:   O2 Device: CO2 nasal cannula (08/03/17 1256)   Adequate oxygenation and airway patent    Complications related to anesthesia: None    Post-anesthesia assessment completed.  No concerns    Signed By: Adam Carlson MD     August 3, 2017

## 2017-08-03 NOTE — PERIOP NOTES
Patient: Ryley Neri MRN: [id number]  SSN: [id number]   YOB: 1970  Age: 49 y.o. Sex: female     Patient is status post Procedure(s):  [address] with mesh.     Surgeon(s) and Role:     * Penny Stoddard MD - Primary     * Estevan Sewell MD - Assisting    Local/Dose/Irrigation:  0.5% bupivacaine w epi                  Peripheral IV 08/03/17 Right Arm (Active)   Dressing Status Clean, dry, & intact 8/3/2017  6:42 AM   Dressing Type Transparent 8/3/2017  6:42 AM   Hub Color/Line Status Infusing 8/3/2017  6:42 AM       Peripheral IV 08/03/17 Left Arm (Active)   Dressing Status Clean, dry, & intact 8/3/2017  7:04 AM   Dressing Type Transparent 8/3/2017  7:04 AM   Hub Color/Line Status Infusing 8/3/2017  7:04 AM                           Dressing/Packing:  Wound Abdomen Anterior-DRESSING TYPE: Topical skin adhesive/glue (08/03/17 1100)  Splint/Cast:  ]    Other:

## 2017-08-03 NOTE — PERIOP NOTES
DR Tiff Gaytan at bedside. Admission orders being rec'd. Pt states that he told her before surgery that she would spend the night for pain control.

## 2017-08-03 NOTE — IP AVS SNAPSHOT
[address] 
[phone] Patient: Pili Choudhary MRN: [id number] CKU:[id number] Current Discharge Medication List  
  
START taking these medications Dose & Instructions Dispensing Information Comments Morning Noon Evening Bedtime HYDROcodone-acetaminophen  mg tablet Commonly known as:  Laura Gonzales Your last dose was: Your next dose is:    
   
   
 Dose:  1 Tab Take 1 Tab by mouth every six (6) hours as needed for Pain. Max Daily Amount: 4 Tabs. Quantity:  28 Tab Refills:  0 CONTINUE these medications which have CHANGED Dose & Instructions Dispensing Information Comments Morning Noon Evening Bedtime  
 cyclobenzaprine 10 mg tablet Commonly known as:  FLEXERIL What changed:  See the new instructions. Your last dose was: Your next dose is: TAKE 1 TABLET BY MOUTH 3 TIMES A DAY AS NEEDED MUSCLE SPASM Quantity:  90 Tab Refills:  5  
     
   
   
   
  
 ketoconazole 2 % topical cream  
Commonly known as:  NIZORAL What changed:   
- when to take this 
- reasons to take this Your last dose was: Your next dose is:    
   
   
 Apply  to affected area daily. Quantity:  30 g Refills:  0 CONTINUE these medications which have NOT CHANGED Dose & Instructions Dispensing Information Comments Morning Noon Evening Bedtime  
 ascorbic acid (vitamin C) 250 mg tablet Commonly known as:  VITAMIN C Your last dose was: Your next dose is:    
   
   
 Dose:  500 mg Take 500 mg by mouth two (2) times a day. Refills:  0 Benefiber Clear 3 gram/3.5 gram Powd Generic drug:  wheat dextrin Your last dose was: Your next dose is: Take  by mouth daily. 2 Teaspoons daily AT 7AM AND 5PM  
 Refills:  0 buPROPion  mg SR tablet Commonly known as:  Dieudonne Chapa Your last dose was: Your next dose is: TAKE 1 TABLET BY MOUTH EVERY DAY Quantity:  30 Tab Refills:  5  
     
   
   
   
  
 busPIRone 5 mg tablet Commonly known as:  BUSPAR Your last dose was: Your next dose is:    
   
   
 Dose:  5 mg Take 5 mg by mouth as needed. Refills:  0  
     
   
   
   
  
 butalbital-acetaminophen-caffeine -40 mg per tablet Commonly known as:  [place] Coke Your last dose was: Your next dose is:    
   
   
 Dose:  1 Tab Take 1 Tab by mouth every six (6) hours as needed for Pain. Max Daily Amount: 4 Tabs. Quantity:  40 Tab Refills:  3  
     
   
   
   
  
 calcium citrate-vitamin d3 315-200 mg-unit Tab Commonly known as:  CITRACAL+D Your last dose was: Your next dose is:    
   
   
 Dose:  2 Tab Take 2 Tabs by mouth two (2) times daily (with meals). Indications: Pt states, \"medication has been change to take at noon and dinner. \"  
 Refills:  0  
     
   
   
   
  
 cetirizine 10 mg tablet Commonly known as:  ZYRTEC Your last dose was: Your next dose is: Take  by mouth nightly. Refills:  0  
     
   
   
   
  
 CULTURELLE PO Your last dose was: Your next dose is:    
   
   
 Dose:  1 Tab Take 1 Tab by mouth daily. Refills:  0  
     
   
   
   
  
 cyanocobalamin 1,000 mcg tablet Your last dose was: Your next dose is:    
   
   
 Dose:  5000 mcg Take 5,000 mcg by mouth daily. Refills:  0  
     
   
   
   
  
 escitalopram oxalate 10 mg tablet Commonly known as:  Angus Rash Your last dose was: Your next dose is:    
   
   
 Dose:  10 mg Take 1 Tab by mouth daily. Quantity:  30 Tab Refills:  5  
     
   
   
   
  
 famotidine 40 mg tablet Commonly known as:  PEPCID Your last dose was: Your next dose is:    
   
   
 Dose:  40 mg Take 1 Tab by mouth two (2) times a day. Indications: gastroesophageal reflux disease Quantity:  60 Tab Refills:  5 FLINTSTONES COMPLETE Chew Generic drug:  pediatric multivitamin no.76 Your last dose was: Your next dose is:    
   
   
 Dose:  1 Tab Take 1 Tab by mouth two (2) times a day. Refills:  0  
     
   
   
   
  
 FLONASE 50 mcg/actuation nasal spray Generic drug:  fluticasone Your last dose was: Your next dose is:    
   
   
 Dose:  2 Spray 2 Sprays by Both Nostrils route nightly as needed for Rhinitis (as needed). Refills:  0  
     
   
   
   
  
 gabapentin 300 mg capsule Commonly known as:  NEURONTIN Your last dose was: Your next dose is: TAKE ONE CAPSULE BY MOUTH IN THE MORNING, 1 CAPSULE AT NOON, AND 2 CAPSULES AT BEDTIME Quantity:  120 Cap Refills:  5  
     
   
   
   
  
 hyoscyamine SL 0.125 mg SL tablet Commonly known as:  LEVSIN/SL Your last dose was: Your next dose is: TAKE 1 TAB BY SUBLINGUAL ROUTE EVERY FOUR (4) HOURS AS NEEDED FOR CRAMPING. Quantity:  30 Tab Refills:  1  
     
   
   
   
  
 magnesium 250 mg Tab Your last dose was: Your next dose is:    
   
   
 Dose:  500 mg Take 500 mg by mouth daily. Refills:  0 MIRALAX 17 gram packet Generic drug:  polyethylene glycol Your last dose was: Your next dose is:    
   
   
 Dose:  17 g Take 17 g by mouth daily. Refills:  0  
     
   
   
   
  
 ondansetron 4 mg disintegrating tablet Commonly known as:  ZOFRAN ODT Your last dose was: Your next dose is: TAKE 1 TABLET BY MOUTH EVERY 8 HOURS AS NEEDED FOR NAUSEA Quantity:  30 Tab Refills:  0  
     
   
   
   
  
 OTHER Your last dose was: Your next dose is:    
   
   
 C-PAP Machine Refills:  0  
     
   
   
   
  
 SYNTHROID 175 mcg tablet Generic drug:  levothyroxine Your last dose was: Your next dose is: TAKE 1 TABLET BY MOUTH EVERY DAY Quantity:  30 Tab Refills:  5 SYSTANE (PROPYLENE GLYCOL) 0.4-0.3 % Drop Generic drug:  peg 400-propylene glycol Your last dose was: Your next dose is:    
   
   
 Dose:  1 Drop Administer 1 Drop to both eyes as needed. Refills:  0  
     
   
   
   
  
 VITAMIN D3 5,000 unit Tab tablet Generic drug:  cholecalciferol (VITAMIN D3) Your last dose was: Your next dose is:    
   
   
 Dose:  5000 Units Take 5,000 Units by mouth two (2) times a day. LIQUID SOFT GEL Refills:  0 STOP taking these medications TYLENOL EXTRA STRENGTH 500 mg tablet Generic drug:  acetaminophen Where to Get Your Medications Information on where to get these meds will be given to you by the nurse or doctor. ! Ask your nurse or doctor about these medications HYDROcodone-acetaminophen  mg tablet

## 2017-08-03 NOTE — PERIOP NOTES
PATIENT INTERVIEWED IN PREOP. NAME AND ALLERGY BAND VISIBLE AND CORRECT PER PATIENT. PATIENT HAS UNDERSTANDING OF PROCEDURE AND SURGICAL SITE. EDUCATIONAL NEEDS MET. PATIENT STATES NO PAIN, JUST CONSTIPATED.

## 2017-08-04 VITALS
HEART RATE: 70 BPM | OXYGEN SATURATION: 97 % | BODY MASS INDEX: 28.36 KG/M2 | RESPIRATION RATE: 16 BRPM | TEMPERATURE: 97.8 F | HEIGHT: 63 IN | SYSTOLIC BLOOD PRESSURE: 111 MMHG | WEIGHT: 160.05 LBS | DIASTOLIC BLOOD PRESSURE: 70 MMHG

## 2017-08-04 PROCEDURE — 74011250637 HC RX REV CODE- 250/637: Performed by: SURGERY

## 2017-08-04 PROCEDURE — 74011250636 HC RX REV CODE- 250/636: Performed by: SURGERY

## 2017-08-04 PROCEDURE — 99218 HC RM OBSERVATION: CPT

## 2017-08-04 RX ADMIN — KETOROLAC TROMETHAMINE 15 MG: 30 INJECTION, SOLUTION INTRAMUSCULAR at 04:48

## 2017-08-04 RX ADMIN — ONDANSETRON 4 MG: 2 INJECTION INTRAMUSCULAR; INTRAVENOUS at 08:02

## 2017-08-04 RX ADMIN — ESCITALOPRAM OXALATE 10 MG: 10 TABLET ORAL at 09:46

## 2017-08-04 RX ADMIN — Medication 10 ML: at 06:37

## 2017-08-04 RX ADMIN — GABAPENTIN 300 MG: 300 CAPSULE ORAL at 09:46

## 2017-08-04 RX ADMIN — SODIUM CHLORIDE, SODIUM LACTATE, POTASSIUM CHLORIDE, AND CALCIUM CHLORIDE 125 ML/HR: 600; 310; 30; 20 INJECTION, SOLUTION INTRAVENOUS at 06:36

## 2017-08-04 RX ADMIN — BUPROPION HYDROCHLORIDE 100 MG: 100 TABLET, FILM COATED, EXTENDED RELEASE ORAL at 09:46

## 2017-08-04 RX ADMIN — HYDROCODONE BITARTRATE AND ACETAMINOPHEN 1 TABLET: 10; 325 TABLET ORAL at 01:23

## 2017-08-04 RX ADMIN — FAMOTIDINE 40 MG: 20 TABLET, FILM COATED ORAL at 09:46

## 2017-08-04 RX ADMIN — HYDROCODONE BITARTRATE AND ACETAMINOPHEN 1 TABLET: 10; 325 TABLET ORAL at 08:02

## 2017-08-04 RX ADMIN — LEVOTHYROXINE SODIUM 175 MCG: 125 TABLET ORAL at 06:36

## 2017-08-04 NOTE — PROGRESS NOTES
I have reviewed discharge instructions with the patient and spouse. The patient and spouse verbalized understanding. RX for Norco also given at this time.

## 2017-08-04 NOTE — PROGRESS NOTES
Reviewed medical chart; met with the patient at the bedside. Patient is being seen for incisional hernia, without obstruction or gangrene. Note that she has a history of gastric bypass surgery, depression, and anxiety among other conditions. Patient lives with her ; they have three daughters, one of whom lives with them. Patient does not use any DME. She has a PCP -Dr. Tigre Martinez and gets her prescriptions at [facility] on [address] [place] [address]. She states that she is a \"domestic . \"  Care Management will continue to follow her disposition. Eunice Schmidt[place] [facility] [place] Management Interventions  PCP Verified by CM:  Yes  Palliative Care Consult (Criteria: CHF and RRAT>21): No  Mode of Transport at Discharge:  (Patient will travel via car at discharge.  )  MyChart Signup: No  Discharge Durable Medical Equipment: No  Physical Therapy Consult: No  Occupational Therapy Consult: No  Speech Therapy Consult: No  Current Support Network: Lives with Spouse  Confirm Follow Up Transport:  (Patient will travel via car at discharge.  )  Plan discussed with Pt/Family/Caregiver: Yes  Freedom of Choice Offered: Yes  Discharge Location  Discharge Placement: Home

## 2017-08-04 NOTE — PROGRESS NOTES
Bedside shift change report given to IBAN Herring (oncoming nurse) by Agustina Estrella (offgoing nurse). Report included the following information SBAR, Kardex, Intake/Output, MAR and Recent Results.

## 2017-08-04 NOTE — PROGRESS NOTES
Patient will discharge today. [address]. Follow Up Appointment was scheduled for 8/10/17 at 10:45AM. Handoff was given to Nurse Navigators, [place] and Cheyenne Saldivar via [employer]. No other discharge needs identified at this time.    ROSE MARIE Rutledge

## 2017-08-04 NOTE — DISCHARGE SUMMARY
Physician Discharge Summary     Patient ID:  Dunia Monahan  [id number]  17 y.o.  1970    Admit Date: 8/3/2017    Discharge Date:     Admission Diagnoses: INCISIONAL HERNIAS; Incisional hernia    Admission Condition: Good    Discharge Diagnoses:  Principal Problem:    Incisional hernia, without obstruction or gangrene (8/3/2017)    Active Problems:    Anxiety (3/4/2011)      Depression (3/4/2011)      Status following gastric bypass for weight loss (7/28/2016)      Overview: 7/11/16 robotic-assisted laparoscopic Mathieu en Y gastric bypass [place])         Discharge Condition: Good    Procedure(s):    8/3/2017 - Procedure(s):  Uus-Kal[address]aja 39 with mesh      Hospital Course:   Normal hospital course for this procedure. Consults: None    Significant Diagnostic Studies: none    Disposition: home    Patient Instructions:     Current Discharge Medication List      START taking these medications    Details   HYDROcodone-acetaminophen (NORCO)  mg tablet Take 1 Tab by mouth every six (6) hours as needed for Pain. Max Daily Amount: 4 Tabs. Qty: 28 Tab, Refills: 0         CONTINUE these medications which have NOT CHANGED    Details   gabapentin (NEURONTIN) 300 mg capsule TAKE ONE CAPSULE BY MOUTH IN THE MORNING, 1 CAPSULE AT NOON, AND 2 CAPSULES AT BEDTIME  Qty: 120 Cap, Refills: 5      escitalopram oxalate (LEXAPRO) 10 mg tablet Take 1 Tab by mouth daily. Qty: 30 Tab, Refills: 5    Associated Diagnoses: Anxiety      famotidine (PEPCID) 40 mg tablet Take 1 Tab by mouth two (2) times a day. Indications: gastroesophageal reflux disease  Qty: 60 Tab, Refills: 5      SYNTHROID 175 mcg tablet TAKE 1 TABLET BY MOUTH EVERY DAY  Qty: 30 Tab, Refills: 5      cyanocobalamin 1,000 mcg tablet Take 5,000 mcg by mouth daily. pediatric multivitamin no.76 (FLINTSTONES COMPLETE) chew Take 1 Tab by mouth two (2) times a day. polyethylene glycol (MIRALAX) 17 gram packet Take 17 g by mouth daily. buPROPion SR (WELLBUTRIN SR) 100 mg SR tablet TAKE 1 TABLET BY MOUTH EVERY DAY  Qty: 30 Tab, Refills: 5      butalbital-acetaminophen-caffeine (FIORICET, ESGIC) -40 mg per tablet Take 1 Tab by mouth every six (6) hours as needed for Pain. Max Daily Amount: 4 Tabs. Qty: 40 Tab, Refills: 3      cyclobenzaprine (FLEXERIL) 10 mg tablet TAKE 1 TABLET BY MOUTH 3 TIMES A DAY AS NEEDED MUSCLE SPASM  Qty: 90 Tab, Refills: 5    Associated Diagnoses: Chronic midline low back pain without sciatica      busPIRone (BUSPAR) 5 mg tablet Take 5 mg by mouth as needed. Refills: 0      ascorbic acid, vitamin C, (VITAMIN C) 250 mg tablet Take 500 mg by mouth two (2) times a day. LACTOBACILLUS RHAMNOSUS GG (CULTURELLE PO) Take 1 Tab by mouth daily. magnesium 250 mg tab Take 500 mg by mouth daily. calcium citrate-vitamin d3 (CITRACAL+D) 315-200 mg-unit tab Take 2 Tabs by mouth two (2) times daily (with meals). Indications: Pt states, \"medication has been change to take at noon and dinner. \"      cetirizine (ZYRTEC) 10 mg tablet Take  by mouth nightly. wheat dextrin (BENEFIBER CLEAR) 3 gram/3.5 gram powd Take  by mouth daily. 2 Teaspoons daily AT 7AM AND 5PM      cholecalciferol, VITAMIN D3, (VITAMIN D3) 5,000 unit tab tablet Take 5,000 Units by mouth two (2) times a day. LIQUID SOFT GEL    Associated Diagnoses: Morbid obesity with BMI of 45.0-49.9, adult (HCC)      fluticasone (FLONASE) 50 mcg/actuation nasal spray 2 Sprays by Both Nostrils route nightly as needed for Rhinitis (as needed). hyoscyamine SL (LEVSIN/SL) 0.125 mg SL tablet TAKE 1 TAB BY SUBLINGUAL ROUTE EVERY FOUR (4) HOURS AS NEEDED FOR CRAMPING. Qty: 30 Tab, Refills: 1      ondansetron (ZOFRAN ODT) 4 mg disintegrating tablet TAKE 1 TABLET BY MOUTH EVERY 8 HOURS AS NEEDED FOR NAUSEA  Qty: 30 Tab, Refills: 0    Associated Diagnoses: Nausea      ketoconazole (NIZORAL) 2 % topical cream Apply  to affected area daily.   Qty: 30 g, Refills: 0 Associated Diagnoses: Fungal dermatitis      peg 400-propylene glycol (SYSTANE) 0.4-0.3 % drop Administer 1 Drop to both eyes as needed.       OTHER C-PAP Machine         STOP taking these medications       acetaminophen (TYLENOL EXTRA STRENGTH) 500 mg tablet Comments:   Reason for Stopping:               Activity: No heavy lifting for 4 weeks  Diet: Regular Diet  Wound Care: None needed    Follow-up with Omar Vargas MD in 2 week(s)  Follow-up tests/labs n/a    Signed:  Omar Vargas MD  8/4/2017  10:10 AM

## 2017-08-04 NOTE — PROGRESS NOTES
Bedside and Verbal shift change report given to Savanah (oncoming nurse) by Kingsley Salazar (offgoing nurse). Report included the following information SBAR, Kardex and Intake/Output.

## 2017-08-04 NOTE — PROGRESS NOTES
Surgery Progress Note    Today's date: 2017       Admit Date: 8/3/2017    Subjective:       Patient has complaints of straining to urinate. Pain is controlled with current analgesics. Medication(s) being used: prescription NSAID's including ketorolac (Toradol), narcotic analgesics including hydrocodone/acetaminophen (Lorcet, Lortab, Maxidone, Norco, Vicodin, [place], [place]). Ambulation: independent  Voiding:spontaneous  Diet: Clear Liquid. She reports no nausea and no vomiting. Bowel Movements: None    Objective:     Temp (24hrs), Av.9 °F (36.6 °C), Min:97.8 °F (36.6 °C), Max:98.1 °F (36.7 °C)     Visit Vitals    /70    Pulse 70    Temp 97.8 °F (36.6 °C)    Resp 16    Ht 5' 3\" (1.6 m)    Wt 160 lb 0.9 oz (72.6 kg)    LMP 2011    SpO2 97%    Breastfeeding No    BMI 28.35 kg/m2              1901 -  0700  In: 4750 [P.O.:600; I.V.:4150]  Out: 2850 [Urine:2800]    EXAM: GENERAL: no distress   ABDOMEN:  flat and obese, No abdominal tenderness,    Incision--healing well, no significant drainage; clean/dry      No results found for this or any previous visit (from the past 24 hour(s)). Rad: n/a    Assessment:     Principal Problem:    Incisional hernia, without obstruction or gangrene (8/3/2017)    Active Problems:    Anxiety (3/4/2011)      Depression (3/4/2011)      Status following gastric bypass for weight loss (2016)      Overview: 16 robotic-assisted laparoscopic Mathieu en Y gastric bypass ([place] [facility])       1 Day Post-Op  Procedure(s):  Uus-Kalamaja 39 with mesh (8/3/2017)  Recovered well  Condition--stable  Ready for dc    Plan:     Discharge to home  Diet: reg  Meds: narcotic analgesics including hydrocodone/acetaminophen (Lorcet, Lortab, Maxidone, Norco, Vicodin, Xodol, Zydone). , resume all meds  Follow-up: in 2 week(s), call office for appt.      Signed By: Mary Huffman MD     2017

## 2017-08-04 NOTE — OP NOTES
Robotic-assisted Laparoscopic Repair of Ventral Incisional Hernia      Date of Surgery: 8/3/2017     Preoperative Diagnosis: INCISIONAL HERNIAS (2)    Postoperative Diagnosis: NCISIONAL HERNIAS (2)    Surgeon(s) and Role:     * Mary Huffman MD - Primary     Assistant:  Stefan Foy CSA    Anesthesia: General    Procedure: Procedure(s):  Uus-Shelbi 39 with mesh     Findings: 2 incisional hernias: lower midline incisional hernia (10 cm x 11 cm),   periumbilical incisional hernia (4 cm x 4 cm)  Lower preperitoneal repair, 15 cm x 15 cm soft mesh; periumbilical IPOM repair 50.8 cm x 15.2 cm    Estimated Blood Loss: 50 mL  IV: LR 1900 mL Urine: 800 mL    Implants:   Implant Name Type Inv. Item Serial No.  Lot No. LRB No. Used Action   MESH HAIR SFT BARD 6X6IN --  - SNA  MESH HAIR SFT BARD 6X6IN --  NA BARD DAVOL [id number] N/A 1 Implanted   MESH W/ECHO PS 4X6IN ELLIPSE -- VENTRALIGHT ORDER BY CA - SNA   MESH W/ECHO PS 4X6IN ELLIPSE -- VENTRALIGHT ORDER BY CA NA BARD DAVOL [id number] N/A 1 Implanted         Specimens: * No specimens in log *             Complications:  None; patient tolerated the procedure well. Wound prophylaxis: Ancef administered IV by anesthetist prior to incision. VTE prophylaxis: SCDs fitted and started prior to induction of anesthesia; Indications: Pt has a history of incisional hernias (2) after hysterectomy. Both are visible with Valsalva: periumbilical and lower midline She presents for elective repair. Procedure in Detail:  The patient was seen in the Holding Area. After obtaining informed consent the patient was taken to the operating room, identified as Dunia Monahan, and the procedure verified. A Time Out was held and the above information confirmed. The patient was placed supine position. After establishing general anesthesia, a Uribe catheter was placed then the abdomen was prepped and draped in sterile fashion.      Local anesthetic was administered to the dermis and the fascia at all the port sites. An incision was made in the epigastric midline then a Veress needle was introduced. Placement was confirmed with a saline drop test. Insufflation was applied to achieve pneumoperitoneum of 15 mmHg. A daVinci 8-mm port was introduced at the incision. Placement was confirmed when the laparscope was inserted. No injury was seen to the underlying viscera. The pneumoperitoneum was maintained and the remaining ports were placed under direct vision. The patient was positioned in Trendelenburg. The robotic arms were docked. Adhesions between omentum and abdominal wall were seen at periumbilical defect and along the midline to the pubic bone. The adhesions were taken down sharply delivering several areas of adherent omentum from the hernia sac(s) and fascia. Hernia defect(s) was/were seen at periumbilical and low midline. An attempt was made to dissect the peritoneum from the periumbilical hernia but was unsuccessful due to scarring. This was attempted again lateral to the rectus muscle below the level of the umbilicus. Blunt dissection was performed bilaterally and inferiorly until the pubic bone and Jae's ligament was exposed. . The hernia sac was reduced with this dissection and kept intact. The insufflation pressure was reduced then the anterior rectus fascia and medial edge of the rectus muscles were closed with running 0 V-loc suture. After measuring the defect a Bard soft mesh (15 cm by 15 cm) was used. The hernia mesh was then placed in preperitoneal position. The inferior edge was sutured to Jae's ligament with 2-0 V-loc (non-absorbable). The peritoneum was closed with running 3-0 V-loc suture providing complete coverage of the mesh. The periumbilical hernia was addressed. Its measurements noted above. It was closed primarily with runnning 0 V-loc sutures. A Ventralight 10 cm x 15 cm was used in IPOM fashion.  The mesh was inserted then positioned with the Echo positioning system. The periphery of the mesh was sutured to the peritoneum inferiorly and posterior rectus fascia laterally. The superior edge was too close to the laparoscope to allow suturing. The robotic arms were undocked then a 5 mm port was placed at the RLQ. The superior edge of the mesh was secured with Secure Strap hernia tacks. Pneumoperitoneum was completely reduced then the ports were removed. The skin was then closed with Monocryl and Dermabond was applied. Instrument, sponge, and needle counts were correct at closure and at the conclusion of the case. The Uribe catheter was removed. The patient was extubated and taken to recovery room in good condition having tolerated the procedure well. Disposition: PACU - hemodynamically stable.            Condition: stable    Signed By: Devang Cantu MD     August 3, 2017

## 2017-08-04 NOTE — PROGRESS NOTES
Spiritual Care Partner Volunteer visited patient in Surgical Unit on 8/4/17.   Documented by:  Sunday Mantilla [id number] Jean Marie Jeffries ([id number])

## 2017-08-14 ENCOUNTER — OFFICE VISIT (OUTPATIENT)
Dept: SURGERY | Age: 47
End: 2017-08-14

## 2017-08-14 VITALS
RESPIRATION RATE: 18 BRPM | HEIGHT: 63 IN | BODY MASS INDEX: 28.62 KG/M2 | OXYGEN SATURATION: 99 % | TEMPERATURE: 97.8 F | WEIGHT: 161.5 LBS | SYSTOLIC BLOOD PRESSURE: 120 MMHG | DIASTOLIC BLOOD PRESSURE: 80 MMHG | HEART RATE: 71 BPM

## 2017-08-14 DIAGNOSIS — K43.2 VENTRAL INCISIONAL HERNIA WITHOUT OBSTRUCTION OR GANGRENE: Primary | ICD-10-CM

## 2017-08-14 DIAGNOSIS — Z09 FOLLOW-UP EXAMINATION FOLLOWING SURGERY: ICD-10-CM

## 2017-08-14 RX ORDER — HYDROCODONE BITARTRATE AND ACETAMINOPHEN 5; 325 MG/1; MG/1
1 TABLET ORAL
Qty: 28 TAB | Refills: 0 | Status: SHIPPED | OUTPATIENT
Start: 2017-08-14 | End: 2017-08-30 | Stop reason: ALTCHOICE

## 2017-08-14 NOTE — PROGRESS NOTES
1. Have you been to the ER, urgent care clinic since your last visit? Hospitalized since your last visit? no    2. Have you seen or consulted any other health care providers outside of the [address] [place] [address] since your last visit? Include any pap smears or colon screening.  no

## 2017-08-14 NOTE — PROGRESS NOTES
Subjective:    Roma Gastelum is a 49 y.o. female presents for postop care 11 days following robotic assisted hernia repair. Patient is also 13 months S/P RYGB. She reports bending over this morning and experienced severe sharp pains in the mid abdomen. Patient also notes a \"dull throbbing\" in RLQ, but states that it is normal. She continues to wear the abdominal binder in a nearly constant manner. Appetite is good. Eating a regular diet  without difficulty. Bowel movements are  regular. Pain is moderately well-controlled with current analgesics. Medication(s) being used: narcotic analgesics including Norco  mg. She requests a refill on her medication, but asks that she receive 5mg instead of 10mg due to decreased pain. Objective:     Visit Vitals    /80    Pulse 71    Temp 97.8 °F (36.6 °C) (Oral)    Resp 18    Ht 5' 3\" (1.6 m)    Wt 161 lb 8 oz (73.3 kg)    LMP 01/29/2011    SpO2 99%    BMI 28.61 kg/m2       Abdomen: soft, bowel sounds active, non-tender   Incision:   healing well, no drainage, no erythema, no hernia, no seroma, no swelling, no dehiscence, incision well approximated     Assessment:     Diagnoses and all orders for this visit:    1. Ventral incisional hernia without obstruction or gangrene  -     HYDROcodone-acetaminophen (NORCO) 5-325 mg per tablet; Take 1 Tab by mouth every six (6) hours as needed for Pain. Max Daily Amount: 4 Tabs. 2. Follow-up examination following surgery  -     HYDROcodone-acetaminophen (NORCO) 5-325 mg per tablet; Take 1 Tab by mouth every six (6) hours as needed for Pain. Max Daily Amount: 4 Tabs. Patient is continuing to experience some abdominal pain, but this is to be expected considering she is only S/P 11 days. Otherwise, she is doing well postoperatively. Plan:     1. Wound care discussed. 2. Pt is to increase activities as tolerated. 3. Follow-up in 2 week(s).       2:35 PM - 2:42 PM   Total time spent with patient: 7 minutes.     Signed By: Abida Nance MD     August 14, 2017         Written by June Maynard, as dictated by Bisi Hartman MD.

## 2017-08-14 NOTE — MR AVS SNAPSHOT
Visit Information Date & Time Provider Department Dept. Phone Encounter #  
 8/14/2017  2:20 PM Cris Varela MD [place] [phone] [id number] Your Appointments 8/30/2017 10:40 AM  
FOLLOW UP 10 with Romulo Wright NP  
[facility] 22 502 ([facility]) Appt Note: TWO WEEK F/U  
 [address] [facility] [address]  
[address] [facility] [address]  
  
    
 11/6/2017  8:15 AM  
ROUTINE CARE with Su Gomez MD  
[facility]) Appt Note: 6 mo thyroid; 6 mo thyroid [address] Billye Yovani [address]  
  
   
 Del[place] Waters [phone]  
  
    
 1/17/2018 10:00 AM  
ESTABLISHED PATIENT with Romulo Wright NP  
[facility] 22 550 ([facility]) [address] [facility] [address]  
[address] [place] [address] Upcoming Health Maintenance Date Due  
 FOOT EXAM Q1 5/31/1979 MICROALBUMIN Q1 10/13/2016 EYE EXAM RETINAL OR DILATED Q1 2/8/2017 HEMOGLOBIN A1C Q6M 5/2/2017 LIPID PANEL Q1 6/27/2017 INFLUENZA AGE 9 TO ADULT 8/1/2017 DTaP/Tdap/Td series (2 - Td) 11/12/2023 Allergies as of 8/14/2017  Review Complete On: 8/14/2017 By: Cris Varela MD  
  
 Severity Noted Reaction Type Reactions Sulfa (Sulfonamide Antibiotics) High 03/04/2011   Side Effect Anaphylaxis, Other (comments) Difficulty breathing/high fever/delusional    
 Aleve [Naproxen Sodium]  09/30/2013    Nausea Only Codeine  03/04/2011   Side Effect Nausea Only Ibuprofen  03/04/2011   Side Effect Hives Lactose  02/23/2016   Intolerance Other (comments) Gas Penicillin G  03/04/2011   Side Effect Hives Has had cephalexin without issue. Current Immunizations  Reviewed on 7/11/2016 Name Date Influenza Vaccine 11/12/2013 Influenza Vaccine (Quad) PF 10/13/2015 Influenza Vaccine (Whole Virus) 3/1/2013 Influenza Vaccine PF 10/23/2014 Influenza Vaccine Split 11/2/2011 Pneumococcal Polysaccharide (PPSV-23) 3/11/2015 Tdap 11/12/2013 Not reviewed this visit You Were Diagnosed With   
  
 Codes Comments Ventral incisional hernia without obstruction or gangrene    -  Primary ICD-10-CM: T50.4 ICD-9-CM: 553.21 Follow-up examination following surgery     ICD-10-CM: Q71 ICD-9-CM: V67.00 Vitals BP Pulse Temp Resp Height(growth percentile) Weight(growth percentile) 120/80 71 97.8 °F (36.6 °C) (Oral) 18 5' 3\" (1.6 m) 161 lb 8 oz (73.3 kg) LMP SpO2 BMI OB Status Smoking Status 01/29/2011 99% 28.61 kg/m2 Hysterectomy Former Smoker Vitals History BMI and BSA Data Body Mass Index Body Surface Area  
 28.61 kg/m 2 1.81 m 2 Preferred Pharmacy Pharmacy Name Phone [id number] [place], [address] [phone] Your Updated Medication List  
  
   
This list is accurate as of: 8/14/17  3:16 PM.  Always use your most recent med list.  
  
  
  
  
 ascorbic acid (vitamin C) 250 mg tablet Commonly known as:  VITAMIN C Take 500 mg by mouth two (2) times a day. Benefiber Clear 3 gram/3.5 gram Powd Generic drug:  wheat dextrin Take  by mouth daily. 2 Teaspoons daily AT 7AM AND 5PM  
  
 buPROPion  mg SR tablet Commonly known as:  WELLBUTRIN SR  
TAKE 1 TABLET BY MOUTH EVERY DAY  
  
 busPIRone 5 mg tablet Commonly known as:  BUSPAR Take 5 mg by mouth as needed. butalbital-acetaminophen-caffeine -40 mg per tablet Commonly known as:  Shawn Broadwater Take 1 Tab by mouth every six (6) hours as needed for Pain. Max Daily Amount: 4 Tabs. calcium citrate-vitamin d3 315-200 mg-unit Tab Commonly known as:  CITRACAL+D Take 2 Tabs by mouth two (2) times daily (with meals). Indications: Pt states, \"medication has been change to take at noon and dinner. \"  
  
 cetirizine 10 mg tablet Commonly known as:  ZYRTEC Take  by mouth nightly. CULTURELLE PO Take 1 Tab by mouth daily. cyanocobalamin 1,000 mcg tablet Take 5,000 mcg by mouth daily. cyclobenzaprine 10 mg tablet Commonly known as:  FLEXERIL  
TAKE 1 TABLET BY MOUTH 3 TIMES A DAY AS NEEDED MUSCLE SPASM  
  
 escitalopram oxalate 10 mg tablet Commonly known as:  Rosslyn Sarah Take 1 Tab by mouth daily. famotidine 40 mg tablet Commonly known as:  PEPCID Take 1 Tab by mouth two (2) times a day. Indications: gastroesophageal reflux disease FLINTSTONES COMPLETE Chew Generic drug:  pediatric multivitamin no.76 Take 1 Tab by mouth two (2) times a day. FLONASE 50 mcg/actuation nasal spray Generic drug:  fluticasone 2 Sprays by Both Nostrils route nightly as needed for Rhinitis (as needed). gabapentin 300 mg capsule Commonly known as:  NEURONTIN  
TAKE ONE CAPSULE BY MOUTH IN THE MORNING, 1 CAPSULE AT NOON, AND 2 CAPSULES AT BEDTIME  
  
 * HYDROcodone-acetaminophen  mg tablet Commonly known as:  [address] Take 1 Tab by mouth every six (6) hours as needed for Pain. Max Daily Amount: 4 Tabs. * HYDROcodone-acetaminophen 5-325 mg per tablet Commonly known as:  [address] Take 1 Tab by mouth every six (6) hours as needed for Pain. Max Daily Amount: 4 Tabs. hyoscyamine SL 0.125 mg SL tablet Commonly known as:  LEVSIN/SL TAKE 1 TAB BY SUBLINGUAL ROUTE EVERY FOUR (4) HOURS AS NEEDED FOR CRAMPING.  
  
 ketoconazole 2 % topical cream  
Commonly known as:  NIZORAL Apply  to affected area daily. magnesium 250 mg Tab Take 500 mg by mouth daily. MIRALAX 17 gram packet Generic drug:  polyethylene glycol Take 17 g by mouth daily. ondansetron 4 mg disintegrating tablet Commonly known as:  ZOFRAN ODT  
TAKE 1 TABLET BY MOUTH EVERY 8 HOURS AS NEEDED FOR NAUSEA  
  
 OTHER  
C-PAP Machine SYNTHROID 175 mcg tablet Generic drug:  levothyroxine TAKE 1 TABLET BY MOUTH EVERY DAY  
  
 SYSTANE (PROPYLENE GLYCOL) 0.4-0.3 % Drop Generic drug:  peg 400-propylene glycol Administer 1 Drop to both eyes as needed. VITAMIN D3 5,000 unit Tab tablet Generic drug:  cholecalciferol (VITAMIN D3) Take 5,000 Units by mouth two (2) times a day. LIQUID SOFT GEL * Notice: This list has 2 medication(s) that are the same as other medications prescribed for you. Read the directions carefully, and ask your doctor or other care provider to review them with you. Prescriptions Printed Refills HYDROcodone-acetaminophen (NORCO) 5-325 mg per tablet 0 Sig: Take 1 Tab by mouth every six (6) hours as needed for Pain. Max Daily Amount: 4 Tabs. Class: Print Route: Oral  
  
Introducing [facility] & HE[facility]! Dear Manjula Smith: 
Thank you for requesting a [employer] account. Our records indicate that you have previously registered for a [employer] account but its currently inactive. Please call our [employer] support line at [phone]. Additional Information If you have questions, please visit the Frequently Asked Questions section of the [employer] website at [email or web address] Remember, [employer] is NOT to be used for urgent needs. For medical emergencies, dial 911. Now available from your iPhone and Android! Please provide this summary of care documentation to your next provider. Your primary care clinician is listed as Alisa Yanezs. If you have any questions after today's visit, please call [phone].

## 2017-08-18 ENCOUNTER — TELEPHONE (OUTPATIENT)
Dept: SURGERY | Age: 47
End: 2017-08-18

## 2017-08-18 NOTE — TELEPHONE ENCOUNTER
Please call pt back. Patient is requesting Tramadol medication. Patient stated that the more she moves around the more pain comes.

## 2017-08-18 NOTE — TELEPHONE ENCOUNTER
Spoke with patient who wants tramadol call into her pharmacy to take in between [facility] prescription. Per Yancy Prince NP she can take Tylenol in between [facility] pain medication.

## 2017-08-30 ENCOUNTER — OFFICE VISIT (OUTPATIENT)
Dept: SURGERY | Age: 47
End: 2017-08-30

## 2017-08-30 VITALS
HEIGHT: 63 IN | DIASTOLIC BLOOD PRESSURE: 84 MMHG | TEMPERATURE: 98.3 F | SYSTOLIC BLOOD PRESSURE: 130 MMHG | HEART RATE: 70 BPM | RESPIRATION RATE: 20 BRPM | WEIGHT: 160.5 LBS | BODY MASS INDEX: 28.44 KG/M2 | OXYGEN SATURATION: 97 %

## 2017-08-30 DIAGNOSIS — K43.2 INCISIONAL HERNIA, WITHOUT OBSTRUCTION OR GANGRENE: ICD-10-CM

## 2017-08-30 DIAGNOSIS — Z09 SURGICAL FOLLOW-UP CARE: Primary | ICD-10-CM

## 2017-08-30 DIAGNOSIS — Z98.890 HISTORY OF INCISIONAL HERNIA REPAIR: ICD-10-CM

## 2017-08-30 DIAGNOSIS — Z87.19 HISTORY OF INCISIONAL HERNIA REPAIR: ICD-10-CM

## 2017-08-30 DIAGNOSIS — G89.18 POST-OP PAIN: ICD-10-CM

## 2017-08-30 RX ORDER — TRAMADOL HYDROCHLORIDE 50 MG/1
50 TABLET ORAL
Qty: 30 TAB | Refills: 0 | Status: SHIPPED | OUTPATIENT
Start: 2017-08-30 | End: 2018-01-16 | Stop reason: SDUPTHER

## 2017-08-30 NOTE — PROGRESS NOTES
Subjective:   Lizeth Granados is a 49year old female that is  3 weeks status post incisional hernia repair X 2 with mesh with Dr. Yessi Taylor. Patient comes in office for surgical follow up. Stated with incisional pain and tenderness to entire abdomen, requesting pain medication/Patient denies fever and no chills. Patient denies nausea, no vomiting, no chest pain, no shortness of breath, and no abdominal pain. Appetite is good. Eating a regular diet without difficulty. No issues with urination. Bowel movements are regular. Walking for exercise. Objective:   Blood pressure 130/84, pulse 70, temperature 98.3 °F (36.8 °C), resp. rate 20, height 5' 3\" (1.6 m), weight 160 lb 8 oz (72.8 kg), last menstrual period 01/29/2011, SpO2 97 %. General:  alert, no distress     Abdomen: soft, bowel sounds active, non-distended, generalized tender, no abnormal masses, no hernias   Incision:   Location: abdomen    healing well, no drainage, no erythema, no seroma, no swelling, no dehiscence, incisions well approximated   Heart RRR   Lungs: clear to auscultation bilaterally         Assessment:   3 weeks status post incisional hernia repair X 2 with mesh. Post op pain  Plan:     1. Wound care discussed. 2. Continue with lifting restrictions of no more than 40-50 pounds for 2 weeks then increase as tolerated. 3. Prescription for Tramadol given to use as needed for pain. 3. Follow-up in 3 weeks. Patient verbalized understanding and questions were answered to the best of my knowledge and ability. Post op pain educational materials were provided. Advised if any questions or concerns to call the office.

## 2017-08-30 NOTE — PATIENT INSTRUCTIONS
Acute Pain After Surgery: Care Instructions  Your Care Instructions  It's common to have some pain after surgery. Pain doesn't mean that something is wrong or that the surgery didn't go well. But when the pain is severe, it's important to work with your doctor to manage it. It's also important to be aware of a few facts about pain and pain medicine. · You are the only person who knows what your pain feels like. So be sure to tell your doctor when you are in pain or when the pain changes. Then he or she will know how to adjust your medicines. · Pain is often easier to control right after it starts. So it may be better to take regular doses of pain medicine and not wait until the pain gets bad. · Medicine can help control pain. But this doesn't mean you'll have no pain. Medicine works to keep the pain at a level you can live with. With time, you will feel better. Follow-up care is a key part of your treatment and safety. Be sure to make and go to all appointments, and call your doctor if you are having problems. It's also a good idea to know your test results and keep a list of the medicines you take. How can you care for yourself at home? · Be safe with medicines. Read and follow all instructions on the label. ¨ If the doctor gave you a prescription medicine for pain, take it as prescribed. ¨ If you are not taking a prescription pain medicine, ask your doctor if you can take an over-the-counter medicine. · If you take an over-the-counter pain medicine, such as acetaminophen (Tylenol), ibuprofen (Advil, Motrin), or naproxen (Aleve), read and follow all instructions on the label. · Do not take two or more pain medicines at the same time unless the doctor told you to. · Do not drink alcohol while you are taking pain medicines. · Try to walk each day if your doctor recommends it. Start by walking a little more than you did the day before. Bit by bit, increase the amount you walk.  Walking increases blood flow. It also helps prevent pneumonia and constipation. · To prevent constipation from opioid pain medicines:  ¨ Talk to your doctor about a laxative. ¨ Include fruits, vegetables, beans, and whole grains in your diet each day. These foods are high in fiber. ¨ Drink plenty of fluids, enough so that your urine is light yellow or clear like water. Drink water, fruit juice, or other drinks that do not contain caffeine or alcohol. If you have kidney, heart, or liver disease and have to limit fluids, talk with your doctor before you increase the amount of fluids you drink. ¨ Take a fiber supplement, such as Citrucel or Metamucil, every day if needed. Read and follow all instructions on the label. If you take pain medicine for more than a few days, talk to your doctor before you take fiber. When should you call for help? Call your doctor now or seek immediate medical care if:  · Your pain gets worse. · Your pain is not controlled by medicine. Watch closely for changes in your health, and be sure to contact your doctor if you have any problems. Where can you learn more? Go to http://josee-gutierrez.info/. Enter ([phone] 949-116 in the search box to learn more about \"Acute Pain After Surgery: Care Instructions. \"  Current as of: March 20, 2017  Content Version: 11.3  © 8008-1243 [employer]. Care instructions adapted under license by [employer] (which disclaims liability or warranty for this information). If you have questions about a medical condition or this instruction, always ask your healthcare professional. [place] [address] any warranty or liability for your use of this information.

## 2017-08-30 NOTE — PROGRESS NOTES
1. Have you been to the ER, urgent care clinic since your last visit? Hospitalized since your last visit? No    2. Have you seen or consulted any other health care providers outside of the [address] [place] [address] since your last visit? Include any pap smears or colon screening.  No

## 2017-08-30 NOTE — MR AVS SNAPSHOT
Visit Information Date & Time Provider Department Dept. Phone Encounter #  
 8/30/2017 10:40 AM Carlito Yeh NP [facility] 22 008 [phone] [id number] Follow-up Instructions Return in about 3 weeks (around 9/20/2017). Your Appointments 11/6/2017  8:15 AM  
ROUTINE CARE with Taylor Boas, MD  
[facility]) Appt Note: 6 mo thyroid; 6 mo thyroid [address]  
  
   
 [place] Waters [phone]  
  
    
 1/17/2018 10:00 AM  
ESTABLISHED PATIENT with Carlito Yeh NP  
[facility] 22 845 ([facility]) [address] [address]  
[address] [place] [address] Upcoming Health Maintenance Date Due  
 FOOT EXAM Q1 5/31/1979 MICROALBUMIN Q1 10/13/2016 EYE EXAM RETINAL OR DILATED Q1 2/8/2017 HEMOGLOBIN A1C Q6M 5/2/2017 LIPID PANEL Q1 6/27/2017 INFLUENZA AGE 9 TO ADULT 8/1/2017 DTaP/Tdap/Td series (2 - Td) 11/12/2023 Allergies as of 8/30/2017  Review Complete On: 8/30/2017 By: Nayla Lares LPN Severity Noted Reaction Type Reactions Sulfa (Sulfonamide Antibiotics) High 03/04/2011   Side Effect Anaphylaxis, Other (comments) Difficulty breathing/high fever/delusional    
 Aleve [Naproxen Sodium]  09/30/2013    Nausea Only Codeine  03/04/2011   Side Effect Nausea Only Ibuprofen  03/04/2011   Side Effect Hives Lactose  02/23/2016   Intolerance Other (comments) Gas Penicillin G  03/04/2011   Side Effect Hives Has had cephalexin without issue. Current Immunizations  Reviewed on 7/11/2016 Name Date Influenza Vaccine 11/12/2013 Influenza Vaccine (Quad) PF 10/13/2015 Influenza Vaccine (Whole Virus) 3/1/2013 Influenza Vaccine PF 10/23/2014 Influenza Vaccine Split 11/2/2011 Pneumococcal Polysaccharide (PPSV-23) 3/11/2015 Tdap 11/12/2013 Not reviewed this visit You Were Diagnosed With   
  
 Codes Comments Surgical follow-up care    -  Primary ICD-10-CM: W82 ICD-9-CM: V67.00 Post-op pain     ICD-10-CM: G89.18 
ICD-9-CM: 338.18 Incisional hernia, without obstruction or gangrene     ICD-10-CM: K43.2 ICD-9-CM: 553.21 History of incisional hernia repair     ICD-10-CM: Z98.890, Z87.19 ICD-9-CM: V45.89 Vitals BP Pulse Temp Resp Height(growth percentile) Weight(growth percentile) 130/84 (BP 1 Location: Right arm, BP Patient Position: At rest) 70 98.3 °F (36.8 °C) 20 5' 3\" (1.6 m) 160 lb 8 oz (72.8 kg) LMP SpO2 BMI OB Status Smoking Status 01/29/2011 97% 28.43 kg/m2 Hysterectomy Former Smoker Vitals History BMI and BSA Data Body Mass Index Body Surface Area  
 28.43 kg/m 2 1.8 m 2 Preferred Pharmacy Pharmacy Name Phone [facility]- [place], [address] [phone] Your Updated Medication List  
  
   
This list is accurate as of: 8/30/17 11:51 AM.  Always use your most recent med list.  
  
  
  
  
 ascorbic acid (vitamin C) 250 mg tablet Commonly known as:  VITAMIN C Take 500 mg by mouth two (2) times a day. Benefiber Clear 3 gram/3.5 gram Powd Generic drug:  wheat dextrin Take  by mouth daily. 2 Teaspoons daily AT 7AM AND 5PM  
  
 buPROPion  mg SR tablet Commonly known as:  WELLBUTRIN SR  
TAKE 1 TABLET BY MOUTH EVERY DAY  
  
 busPIRone 5 mg tablet Commonly known as:  BUSPAR Take 5 mg by mouth as needed. butalbital-acetaminophen-caffeine -40 mg per tablet Commonly known as:  Slim Creed Take 1 Tab by mouth every six (6) hours as needed for Pain. Max Daily Amount: 4 Tabs. calcium citrate-vitamin d3 315-200 mg-unit Tab Commonly known as:  CITRACAL+D  
 Take 2 Tabs by mouth two (2) times daily (with meals). Indications: Pt states, \"medication has been change to take at noon and dinner. \"  
  
 cetirizine 10 mg tablet Commonly known as:  ZYRTEC Take  by mouth nightly. CULTURELLE PO Take 1 Tab by mouth daily. cyanocobalamin 1,000 mcg tablet Take 5,000 mcg by mouth daily. cyclobenzaprine 10 mg tablet Commonly known as:  FLEXERIL  
TAKE 1 TABLET BY MOUTH 3 TIMES A DAY AS NEEDED MUSCLE SPASM  
  
 escitalopram oxalate 10 mg tablet Commonly known as:  Steff Mealing Take 1 Tab by mouth daily. famotidine 40 mg tablet Commonly known as:  PEPCID Take 1 Tab by mouth two (2) times a day. Indications: gastroesophageal reflux disease FLINTSTONES COMPLETE Chew Generic drug:  pediatric multivitamin no.76 Take 1 Tab by mouth two (2) times a day. FLONASE 50 mcg/actuation nasal spray Generic drug:  fluticasone 2 Sprays by Both Nostrils route nightly as needed for Rhinitis (as needed). gabapentin 300 mg capsule Commonly known as:  NEURONTIN  
TAKE ONE CAPSULE BY MOUTH IN THE MORNING, 1 CAPSULE AT NOON, AND 2 CAPSULES AT BEDTIME  
  
 * HYDROcodone-acetaminophen  mg tablet Commonly known as:  Michael Medina Take 1 Tab by mouth every six (6) hours as needed for Pain. Max Daily Amount: 4 Tabs. * HYDROcodone-acetaminophen 5-325 mg per tablet Commonly known as:  Michael Medina Take 1 Tab by mouth every six (6) hours as needed for Pain. Max Daily Amount: 4 Tabs. hyoscyamine SL 0.125 mg SL tablet Commonly known as:  LEVSIN/SL TAKE 1 TAB BY SUBLINGUAL ROUTE EVERY FOUR (4) HOURS AS NEEDED FOR CRAMPING.  
  
 ketoconazole 2 % topical cream  
Commonly known as:  NIZORAL Apply  to affected area daily. magnesium 250 mg Tab Take 500 mg by mouth daily. MIRALAX 17 gram packet Generic drug:  polyethylene glycol Take 17 g by mouth daily. ondansetron 4 mg disintegrating tablet Commonly known as:  ZOFRAN ODT  
TAKE 1 TABLET BY MOUTH EVERY 8 HOURS AS NEEDED FOR NAUSEA  
  
 OTHER  
C-PAP Machine SYNTHROID 175 mcg tablet Generic drug:  levothyroxine TAKE 1 TABLET BY MOUTH EVERY DAY  
  
 SYSTANE (PROPYLENE GLYCOL) 0.4-0.3 % Drop Generic drug:  peg 400-propylene glycol Administer 1 Drop to both eyes as needed. VITAMIN D3 5,000 unit Tab tablet Generic drug:  cholecalciferol (VITAMIN D3) Take 5,000 Units by mouth two (2) times a day. LIQUID SOFT GEL * Notice: This list has 2 medication(s) that are the same as other medications prescribed for you. Read the directions carefully, and ask your doctor or other care provider to review them with you. Follow-up Instructions Return in about 3 weeks (around 9/20/2017). Patient Instructions Acute Pain After Surgery: Care Instructions Your Care Instructions It's common to have some pain after surgery. Pain doesn't mean that something is wrong or that the surgery didn't go well. But when the pain is severe, it's important to work with your doctor to manage it. It's also important to be aware of a few facts about pain and pain medicine. · You are the only person who knows what your pain feels like. So be sure to tell your doctor when you are in pain or when the pain changes. Then he or she will know how to adjust your medicines. · Pain is often easier to control right after it starts. So it may be better to take regular doses of pain medicine and not wait until the pain gets bad. · Medicine can help control pain. But this doesn't mean you'll have no pain. Medicine works to keep the pain at a level you can live with. With time, you will feel better. Follow-up care is a key part of your treatment and safety. Be sure to make and go to all appointments, and call your doctor if you are having problems. It's also a good idea to know your test results and keep a list of the medicines you take. How can you care for yourself at home? · Be safe with medicines. Read and follow all instructions on the label. ¨ If the doctor gave you a prescription medicine for pain, take it as prescribed. ¨ If you are not taking a prescription pain medicine, ask your doctor if you can take an over-the-counter medicine. · If you take an over-the-counter pain medicine, such as acetaminophen (Tylenol), ibuprofen (Advil, Motrin), or naproxen (Aleve), read and follow all instructions on the label. · Do not take two or more pain medicines at the same time unless the doctor told you to. · Do not drink alcohol while you are taking pain medicines. · Try to walk each day if your doctor recommends it. Start by walking a little more than you did the day before. Bit by bit, increase the amount you walk. Walking increases blood flow. It also helps prevent pneumonia and constipation. · To prevent constipation from opioid pain medicines: ¨ Talk to your doctor about a laxative. ¨ Include fruits, vegetables, beans, and whole grains in your diet each day. These foods are high in fiber. ¨ Drink plenty of fluids, enough so that your urine is light yellow or clear like water. Drink water, fruit juice, or other drinks that do not contain caffeine or alcohol. If you have kidney, heart, or liver disease and have to limit fluids, talk with your doctor before you increase the amount of fluids you drink. ¨ Take a fiber supplement, such as Citrucel or Metamucil, every day if needed. Read and follow all instructions on the label. If you take pain medicine for more than a few days, talk to your doctor before you take fiber. When should you call for help? Call your doctor now or seek immediate medical care if: 
· Your pain gets worse. · Your pain is not controlled by medicine. Watch closely for changes in your health, and be sure to contact your doctor if you have any problems. Where can you learn more? Go to http://josee-gutierrez.info/. Enter ([phone] 823-013 in the search box to learn more about \"Acute Pain After Surgery: Care Instructions. \" Current as of: March 20, 2017 Content Version: 11.3 © 2049-7338 [employer]. Care instructions adapted under license by [employer] (which disclaims liability or warranty for this information). If you have questions about a medical condition or this instruction, always ask your healthcare professional. [address] any warranty or liability for your use of this information. Introducing [facility] & [facility]! Dear Javi Altman: 
Thank you for requesting a [employer] account. Our records indicate that you have previously registered for a [employer] account but its currently inactive. Please call our [employer] support line at [phone]. Additional Information If you have questions, please visit the Frequently Asked Questions section of the [email or web address] website at [email or web address] Remember, [employer] is NOT to be used for urgent needs. For medical emergencies, dial 911. Now available from your iPhone and Android! Please provide this summary of care documentation to your next provider. Your primary care clinician is listed as Katherine Amado. If you have any questions after today's visit, please call [phone].

## 2017-09-25 RX ORDER — BUPROPION HYDROCHLORIDE 100 MG/1
TABLET, EXTENDED RELEASE ORAL
Qty: 30 TAB | Refills: 5 | Status: SHIPPED | OUTPATIENT
Start: 2017-09-25 | End: 2018-03-17 | Stop reason: SDUPTHER

## 2017-09-27 ENCOUNTER — OFFICE VISIT (OUTPATIENT)
Dept: SURGERY | Age: 47
End: 2017-09-27

## 2017-09-27 VITALS
TEMPERATURE: 98.4 F | WEIGHT: 161 LBS | BODY MASS INDEX: 28.53 KG/M2 | RESPIRATION RATE: 18 BRPM | HEART RATE: 88 BPM | DIASTOLIC BLOOD PRESSURE: 90 MMHG | OXYGEN SATURATION: 98 % | HEIGHT: 63 IN | SYSTOLIC BLOOD PRESSURE: 132 MMHG

## 2017-09-27 DIAGNOSIS — Z87.19 S/P HERNIA REPAIR: ICD-10-CM

## 2017-09-27 DIAGNOSIS — Z98.890 S/P HERNIA REPAIR: ICD-10-CM

## 2017-09-27 DIAGNOSIS — Z09 SURGICAL FOLLOW-UP CARE: Primary | ICD-10-CM

## 2017-09-27 RX ORDER — ONDANSETRON 4 MG/1
4 TABLET, ORALLY DISINTEGRATING ORAL
Qty: 30 TAB | Refills: 1 | Status: SHIPPED | OUTPATIENT
Start: 2017-09-27 | End: 2017-11-06 | Stop reason: SDUPTHER

## 2017-09-27 NOTE — PROGRESS NOTES
Subjective:   Sherri Gibbons is a 49year old female that is  7 weeks status post incisional hernia repair X 2 with mesh with Dr. Sara Rosado. Patient comes in office for surgical follow up. Stated feeling better no incisional pain. Stated occasional soreness. No pain medication in use. Patient denies fever and no chills. Patient denies nausea, no vomiting, no chest pain, no shortness of breath, and no abdominal pain. Appetite is good. Eating a regular diet without difficulty. No issues with urination. Bowel movements are regular. Walking for exercise, stated ready to increase physical activities. Objective:   Blood pressure 132/90, pulse 88, temperature 98.4 °F (36.9 °C), temperature source Oral, resp. rate 18, height 5' 3\" (1.6 m), weight 161 lb (73 kg), last menstrual period 01/29/2011, SpO2 98 %. General:  alert, no distress     Abdomen: soft, bowel sounds active, non-tender, no abnormal masses, no hernias   Incision:   Location: abdomen    healing well, no drainage, no erythema, no seroma, no swelling, no dehiscence, incisions well approximated   Heart RRR   Lungs: clear to auscultation bilaterally       Assessment:     7 weeks status post incisional hernia repair X 2 with mesh    Plan:     1. Wound care discussed. 2. Patient is now free of restrictions, Pt is to increase activities as tolerated. .  3. Follow-up in 3 months for regular bariatric follow up. Patient verbalized understanding and questions were answered to the best of my knowledge and ability. Advised if any questions or concerns to call the office.

## 2017-09-27 NOTE — MR AVS SNAPSHOT
Visit Information Date & Time Provider Department Dept. Phone Encounter #  
 9/27/2017  2:20 PM Romulo Wright NP [facility] 22 [phone] [id number] Follow-up Instructions Return in about 6 months (around 3/27/2018). Your Appointments 11/6/2017  8:15 AM  
ROUTINE CARE with Su Gomez MD  
[facility] Jayshree Roberts) Appt Note: 6 mo thyroid; 6 mo thyroid [address] [facility] [address]  
  
   
 Delvin Waters [phone]  
  
    
 1/17/2018  9:00 AM  
ESTABLISHED PATIENT with RAPHAEL Navarro [phone] (Jayshree Roberts) Appt Note: EP; bariatric f/u. . $0CP/$0PB/GSSM/apa/08-  
 [address] [facility] [address]  
[address] [address]  
  
    
 1/17/2018 10:00 AM  
ESTABLISHED PATIENT with Romulo Wright NP  
[facility] 74 [phone] (Jayshree Roberts) [address] [facility] 7 [id number] [phone] Upcoming Health Maintenance Date Due  
 FOOT EXAM Q1 5/31/1979 MICROALBUMIN Q1 10/13/2016 EYE EXAM RETINAL OR DILATED Q1 2/8/2017 HEMOGLOBIN A1C Q6M 5/2/2017 LIPID PANEL Q1 6/27/2017 INFLUENZA AGE 9 TO ADULT 8/1/2017 DTaP/Tdap/Td series (2 - Td) 11/12/2023 Allergies as of 9/27/2017  Review Complete On: 9/27/2017 By: Brigid Nelson Severity Noted Reaction Type Reactions Sulfa (Sulfonamide Antibiotics) High 03/04/2011   Side Effect Anaphylaxis, Other (comments) Difficulty breathing/high fever/delusional    
 Aleve [Naproxen Sodium]  09/30/2013    Nausea Only Codeine  03/04/2011   Side Effect Nausea Only Ibuprofen  03/04/2011   Side Effect Hives Lactose  02/23/2016   Intolerance Other (comments) Gas Penicillin G  03/04/2011   Side Effect Hives Has had cephalexin without issue. Current Immunizations  Reviewed on 7/11/2016 Name Date Influenza Vaccine 11/12/2013 Influenza Vaccine (Quad) PF 10/13/2015 Influenza Vaccine (Whole Virus) 3/1/2013 Influenza Vaccine PF 10/23/2014 Influenza Vaccine Split 11/2/2011 Pneumococcal Polysaccharide (PPSV-23) 3/11/2015 Tdap 11/12/2013 Not reviewed this visit You Were Diagnosed With   
  
 Codes Comments Nausea     ICD-10-CM: R11.0 ICD-9-CM: 787.02 Vitals BP Pulse Temp Resp Height(growth percentile) Weight(growth percentile) 132/90 (BP 1 Location: Left arm, BP Patient Position: Sitting) 88 98.4 °F (36.9 °C) (Oral) 18 5' 3\" (1.6 m) 161 lb (73 kg) LMP SpO2 BMI OB Status Smoking Status 01/29/2011 98% 28.52 kg/m2 Hysterectomy Former Smoker Vitals History BMI and BSA Data Body Mass Index Body Surface Area 28.52 kg/m 2 1.8 m 2 Preferred Pharmacy Pharmacy Name Phone [id number] [place], [address] [phone] Your Updated Medication List  
  
   
This list is accurate as of: 9/27/17  3:17 PM.  Always use your most recent med list.  
  
  
  
  
 ascorbic acid (vitamin C) 250 mg tablet Commonly known as:  VITAMIN C Take 500 mg by mouth two (2) times a day. Benefiber Clear 3 gram/3.5 gram Powd Generic drug:  wheat dextrin Take  by mouth daily. 2 Teaspoons daily AT 7AM AND 5PM  
  
 buPROPion  mg SR tablet Commonly known as:  WELLBUTRIN SR  
TAKE 1 TABLET BY MOUTH EVERY DAY  
  
 busPIRone 5 mg tablet Commonly known as:  BUSPAR Take 5 mg by mouth as needed. butalbital-acetaminophen-caffeine -40 mg per tablet Commonly known as:  Julianne Congress Take 1 Tab by mouth every six (6) hours as needed for Pain. Max Daily Amount: 4 Tabs. calcium citrate-vitamin d3 315-200 mg-unit Tab Commonly known as:  CITRACAL+D  
 Take 2 Tabs by mouth two (2) times daily (with meals). Indications: Pt states, \"medication has been change to take at noon and dinner. \"  
  
 cetirizine 10 mg tablet Commonly known as:  ZYRTEC Take  by mouth nightly. CULTURELLE PO Take 1 Tab by mouth daily. cyanocobalamin 1,000 mcg tablet Take 5,000 mcg by mouth daily. cyclobenzaprine 10 mg tablet Commonly known as:  FLEXERIL  
TAKE 1 TABLET BY MOUTH 3 TIMES A DAY AS NEEDED MUSCLE SPASM * escitalopram oxalate 10 mg tablet Commonly known as:  Belkis Roof Take 1 Tab by mouth daily. * escitalopram oxalate 20 mg tablet Commonly known as:  Belkis Roof TAKE ONE TABLET BY MOUTH ONCE DAILY  
  
 famotidine 40 mg tablet Commonly known as:  PEPCID Take 1 Tab by mouth two (2) times a day. Indications: gastroesophageal reflux disease FLINTSTONES COMPLETE Chew Generic drug:  pediatric multivitamin no.76 Take 1 Tab by mouth two (2) times a day. FLONASE 50 mcg/actuation nasal spray Generic drug:  fluticasone 2 Sprays by Both Nostrils route nightly as needed for Rhinitis (as needed). gabapentin 300 mg capsule Commonly known as:  NEURONTIN  
TAKE ONE CAPSULE BY MOUTH IN THE MORNING, 1 CAPSULE AT NOON, AND 2 CAPSULES AT BEDTIME  
  
 hyoscyamine SL 0.125 mg SL tablet Commonly known as:  LEVSIN/SL TAKE 1 TAB BY SUBLINGUAL ROUTE EVERY FOUR (4) HOURS AS NEEDED FOR CRAMPING.  
  
 ketoconazole 2 % topical cream  
Commonly known as:  NIZORAL Apply  to affected area daily. magnesium 250 mg Tab Take 500 mg by mouth daily. MIRALAX 17 gram packet Generic drug:  polyethylene glycol Take 17 g by mouth daily. * ondansetron 4 mg disintegrating tablet Commonly known as:  ZOFRAN ODT  
TAKE 1 TABLET BY MOUTH EVERY 8 HOURS AS NEEDED FOR NAUSEA  
  
 * ondansetron 4 mg disintegrating tablet Commonly known as:  ZOFRAN ODT Take 1 Tab by mouth every six (6) hours as needed for Nausea.   
  
 OTHER  
 C-PAP Machine SYNTHROID 175 mcg tablet Generic drug:  levothyroxine TAKE 1 TABLET BY MOUTH EVERY DAY  
  
 SYSTANE (PROPYLENE GLYCOL) 0.4-0.3 % Drop Generic drug:  peg 400-propylene glycol Administer 1 Drop to both eyes as needed. traMADol 50 mg tablet Commonly known as:  ULTRAM  
Take 1 Tab by mouth every six (6) hours as needed for Pain. Max Daily Amount: 200 mg. VITAMIN D3 5,000 unit Tab tablet Generic drug:  cholecalciferol (VITAMIN D3) Take 5,000 Units by mouth two (2) times a day. LIQUID SOFT GEL * Notice: This list has 4 medication(s) that are the same as other medications prescribed for you. Read the directions carefully, and ask your doctor or other care provider to review them with you. Prescriptions Sent to Pharmacy Refills  
 ondansetron (ZOFRAN ODT) 4 mg disintegrating tablet 1 Sig: Take 1 Tab by mouth every six (6) hours as needed for Nausea. Class: Normal  
 Pharmacy: [id number] [place], [facility] Elias Morales Ph #: [phone] Route: Oral  
  
Follow-up Instructions Return in about 6 months (around 3/27/2018). Introducing [facility]! Dear Danyelle Luna: 
Thank you for requesting a [employer] account. Our records indicate that you have previously registered for a [employer] account but its currently inactive. Please call our [employer] support line at [phone]. Additional Information If you have questions, please visit the Frequently Asked Questions section of the [employer] website at [email or web address] Remember, [employer] is NOT to be used for urgent needs. For medical emergencies, dial 911. Now available from your iPhone and Android! Please provide this summary of care documentation to your next provider. Your primary care clinician is listed as Leonardo Badillo. If you have any questions after today's visit, please call [phone].

## 2017-09-27 NOTE — PROGRESS NOTES
1. Have you been to the ER, urgent care clinic since your last visit? Hospitalized since your last visit? No    2. Have you seen or consulted any other health care providers outside of the [address] [place] [address] since your last visit? Include any pap smears or colon screening.  No

## 2017-11-02 RX ORDER — FAMOTIDINE 40 MG/1
TABLET, FILM COATED ORAL
Qty: 60 TAB | Refills: 5 | Status: SHIPPED | OUTPATIENT
Start: 2017-11-02 | End: 2017-12-15 | Stop reason: SDUPTHER

## 2017-11-06 ENCOUNTER — OFFICE VISIT (OUTPATIENT)
Dept: INTERNAL MEDICINE CLINIC | Age: 47
End: 2017-11-06

## 2017-11-06 VITALS
DIASTOLIC BLOOD PRESSURE: 80 MMHG | HEIGHT: 63 IN | SYSTOLIC BLOOD PRESSURE: 116 MMHG | WEIGHT: 166 LBS | RESPIRATION RATE: 18 BRPM | OXYGEN SATURATION: 98 % | HEART RATE: 66 BPM | BODY MASS INDEX: 29.41 KG/M2 | TEMPERATURE: 98.2 F

## 2017-11-06 DIAGNOSIS — E03.4 HYPOTHYROIDISM DUE TO ACQUIRED ATROPHY OF THYROID: Primary | ICD-10-CM

## 2017-11-06 DIAGNOSIS — Z23 ENCOUNTER FOR IMMUNIZATION: ICD-10-CM

## 2017-11-06 NOTE — PROGRESS NOTES
HISTORY OF PRESENT ILLNESS  Joseline Mobley is a 49 y.o. female. HPI  Thyroid Disease:  Joseline Mobley is a 49 y.o. female here for follow up of hypothyroidism. Lab Results   Component Value Date/Time    TSH 1.120 05/10/2017 08:19 AM     Residual symptoms fatigue, weight gain, feeling cold and cold intolerance and constipation. she denies anxiousness, feeling excessive energy, palpitations and weight loss  Thyroid medication has been unchanged since last medication check and labs. ROS    Physical Exam   Constitutional: She appears well-developed and well-nourished. No distress. /80 (BP 1 Location: Left arm, BP Patient Position: Sitting)  Pulse 66  Temp 98.2 °F (36.8 °C) (Oral)   Resp 18  Ht 5' 3\" (1.6 m)  Wt 166 lb (75.3 kg)  LMP 01/29/2011  SpO2 98%  BMI 29.41 kg/m2Body mass index is 29.41 kg/(m^2). HENT:   Mouth/Throat: Oropharynx is clear and moist.   Neck: No JVD present. Carotid bruit is not present. No thyromegaly present. Cardiovascular: Normal rate, regular rhythm, normal heart sounds and intact distal pulses. Pulmonary/Chest: Effort normal and breath sounds normal.   Musculoskeletal: She exhibits no edema. Neurological: She is alert. Skin: Skin is warm and dry. She is not diaphoretic. Nursing note and vitals reviewed. ASSESSMENT and PLAN  Diagnoses and all orders for this visit:    1. Hypothyroidism due to acquired atrophy of thyroid - ? Hypothyroid symptoms now. Recheck.  -     TSH 3RD GENERATION  -     T4, FREE    2. Encounter for immunization  -     INFLUENZA VIRUS VACCINE QUADRIVALENT, PRESERVATIVE FREE SYRINGE ([id number])  -     NV IMMUNIZ ADMIN,1 SINGLE/COMB VAC/TOXOID      Follow-up Disposition:  Return in about 6 months (around 5/6/2018).

## 2017-11-06 NOTE — MR AVS SNAPSHOT
Visit Information Date & Time Provider Department Dept. Phone Encounter #  
 11/6/2017  8:15 AM Autumn Dominguez MD [facility] [address] [id number] Follow-up Instructions Return in about 6 months (around 5/6/2018). Your Appointments 1/17/2018  9:00 AM  
ESTABLISHED PATIENT with Luis Lowe, RAPHAEL  
[facility] 22 697 ([address]) Appt Note: EP; bariatric f/u. . $0CP/$0PB/GSSM/apa/08-  
 [address] [facility] [address]  
[address] [address]  
  
    
 1/17/2018 10:00 AM  
ESTABLISHED PATIENT with Luis Lowe, RAPHAEL  
[facility] 22 174 ([address]) [address] [facility] [facility] [address] [facility] [address] [phone] Upcoming Health Maintenance Date Due  
 FOOT EXAM Q1 5/31/1979 MICROALBUMIN Q1 10/13/2016 EYE EXAM RETINAL OR DILATED Q1 2/8/2017 HEMOGLOBIN A1C Q6M 5/2/2017 LIPID PANEL Q1 6/27/2017 INFLUENZA AGE 9 TO ADULT 8/1/2017 DTaP/Tdap/Td series (2 - Td) 11/12/2023 Allergies as of 11/6/2017  Review Complete On: 11/6/2017 By: Kevin Jay LPN Severity Noted Reaction Type Reactions Sulfa (Sulfonamide Antibiotics) High 03/04/2011   Side Effect Anaphylaxis, Other (comments) Difficulty breathing/high fever/delusional    
 Aleve [Naproxen Sodium]  09/30/2013    Nausea Only Codeine  03/04/2011   Side Effect Nausea Only Ibuprofen  03/04/2011   Side Effect Hives Lactose  02/23/2016   Intolerance Other (comments) Gas Penicillin G  03/04/2011   Side Effect Hives Has had cephalexin without issue. Current Immunizations  Reviewed on 7/11/2016 Name Date Influenza Vaccine 11/12/2013 Influenza Vaccine (Quad) PF 10/13/2015 Influenza Vaccine (Whole Virus) 3/1/2013 Influenza Vaccine PF 10/23/2014 Influenza Vaccine Split 11/2/2011 Pneumococcal Polysaccharide (PPSV-23) 3/11/2015 Tdap 11/12/2013 Not reviewed this visit You Were Diagnosed With   
  
 Codes Comments Hypothyroidism due to acquired atrophy of thyroid    -  Primary ICD-10-CM: E03.4 ICD-9-CM: 244.8, 246.8 Vitals BP Pulse Temp Resp Height(growth percentile) Weight(growth percentile) 116/80 (BP 1 Location: Left arm, BP Patient Position: Sitting) 66 98.2 °F (36.8 °C) (Oral) 18 5' 3\" (1.6 m) 166 lb (75.3 kg) LMP SpO2 BMI OB Status Smoking Status 01/29/2011 98% 29.41 kg/m2 Hysterectomy Former Smoker Vitals History BMI and BSA Data Body Mass Index Body Surface Area  
 29.41 kg/m 2 1.83 m 2 Preferred Pharmacy Pharmacy Name Phone [id number] [place], [address] [phone] Your Updated Medication List  
  
   
This list is accurate as of: 11/6/17  8:30 AM.  Always use your most recent med list.  
  
  
  
  
 ascorbic acid (vitamin C) 250 mg tablet Commonly known as:  VITAMIN C Take 500 mg by mouth two (2) times a day. Benefiber Clear 3 gram/3.5 gram Powd Generic drug:  wheat dextrin Take  by mouth daily. 2 Teaspoons daily AT 7AM AND 5PM  
  
 buPROPion  mg SR tablet Commonly known as:  WELLBUTRIN SR  
TAKE 1 TABLET BY MOUTH EVERY DAY  
  
 busPIRone 5 mg tablet Commonly known as:  BUSPAR Take 5 mg by mouth as needed. butalbital-acetaminophen-caffeine -40 mg per tablet Commonly known as:  Gail Carpenter Take 1 Tab by mouth every six (6) hours as needed for Pain. Max Daily Amount: 4 Tabs. calcium citrate-vitamin d3 315-200 mg-unit Tab Commonly known as:  CITRACAL+D Take 2 Tabs by mouth two (2) times daily (with meals). Indications: Pt states, \"medication has been change to take at noon and dinner. \"  
  
 cetirizine 10 mg tablet Commonly known as:  ZYRTEC Take  by mouth nightly.   
  
 CULTURELLE PO  
 Take 1 Tab by mouth daily. cyanocobalamin 1,000 mcg tablet Take 5,000 mcg by mouth daily. cyclobenzaprine 10 mg tablet Commonly known as:  FLEXERIL  
TAKE 1 TABLET BY MOUTH 3 TIMES A DAY AS NEEDED MUSCLE SPASM  
  
 escitalopram oxalate 10 mg tablet Commonly known as:  Arther Putty Take 1 Tab by mouth daily. famotidine 40 mg tablet Commonly known as:  PEPCID  
TAKE 1 TAB BY MOUTH TWO (2) TIMES A DAY. INDICATIONS: GASTROESOPHAGEAL REFLUX DISEASE FLINTSTONES COMPLETE Chew Generic drug:  pediatric multivitamin no.76 Take 1 Tab by mouth two (2) times a day. FLONASE 50 mcg/actuation nasal spray Generic drug:  fluticasone 2 Sprays by Both Nostrils route nightly as needed for Rhinitis (as needed). gabapentin 300 mg capsule Commonly known as:  NEURONTIN  
TAKE ONE CAPSULE BY MOUTH IN THE MORNING, 1 CAPSULE AT NOON, AND 2 CAPSULES AT BEDTIME  
  
 hyoscyamine SL 0.125 mg SL tablet Commonly known as:  LEVSIN/SL TAKE 1 TAB BY SUBLINGUAL ROUTE EVERY FOUR (4) HOURS AS NEEDED FOR CRAMPING.  
  
 ketoconazole 2 % topical cream  
Commonly known as:  NIZORAL Apply  to affected area daily. magnesium 250 mg Tab Take 500 mg by mouth daily. MIRALAX 17 gram packet Generic drug:  polyethylene glycol Take 17 g by mouth daily. ondansetron 4 mg disintegrating tablet Commonly known as:  ZOFRAN ODT  
TAKE 1 TABLET BY MOUTH EVERY 8 HOURS AS NEEDED FOR NAUSEA  
  
 OTHER  
C-PAP Machine SYNTHROID 175 mcg tablet Generic drug:  levothyroxine TAKE 1 TABLET BY MOUTH EVERY DAY  
  
 SYSTANE (PROPYLENE GLYCOL) 0.4-0.3 % Drop Generic drug:  peg 400-propylene glycol Administer 1 Drop to both eyes as needed. traMADol 50 mg tablet Commonly known as:  ULTRAM  
Take 1 Tab by mouth every six (6) hours as needed for Pain. Max Daily Amount: 200 mg. VITAMIN D3 5,000 unit Tab tablet Generic drug:  cholecalciferol (VITAMIN D3) Take 5,000 Units by mouth two (2) times a day. LIQUID SOFT GEL We Performed the Following T4, FREE [id number] CPT(R)] TSH 3RD GENERATION [23806 CPT(R)] Follow-up Instructions Return in about 6 months (around 5/6/2018). Introducing [facility]! Dear Elvis Rivera: 
Thank you for requesting a [employer] account. Our records indicate that you have previously registered for a [employer] account but its currently inactive. Please call our [employer] support line at [phone]. Additional Information If you have questions, please visit the Frequently Asked Questions section of the [employer] website at [email or web address] Remember, [employer] is NOT to be used for urgent needs. For medical emergencies, dial 911. Now available from your iPhone and Android! Please provide this summary of care documentation to your next provider. Your primary care clinician is listed as Melissa Martínez. If you have any questions after today's visit, please call [phone].

## 2017-11-07 LAB
T4 FREE SERPL-MCNC: 1.34 NG/DL (ref 0.82–1.77)
TSH SERPL DL<=0.005 MIU/L-ACNC: 0.99 UIU/ML (ref 0.45–4.5)

## 2017-12-15 RX ORDER — FAMOTIDINE 40 MG/1
TABLET, FILM COATED ORAL
Qty: 180 TAB | Refills: 3 | Status: SHIPPED | OUTPATIENT
Start: 2017-12-15 | End: 2019-01-19 | Stop reason: SDUPTHER

## 2018-01-16 ENCOUNTER — OFFICE VISIT (OUTPATIENT)
Dept: INTERNAL MEDICINE CLINIC | Age: 48
End: 2018-01-16

## 2018-01-16 VITALS
HEART RATE: 63 BPM | RESPIRATION RATE: 17 BRPM | DIASTOLIC BLOOD PRESSURE: 88 MMHG | OXYGEN SATURATION: 99 % | SYSTOLIC BLOOD PRESSURE: 128 MMHG | BODY MASS INDEX: 29.77 KG/M2 | WEIGHT: 168 LBS | HEIGHT: 63 IN | TEMPERATURE: 98 F

## 2018-01-16 DIAGNOSIS — G43.909 MIGRAINE WITHOUT STATUS MIGRAINOSUS, NOT INTRACTABLE, UNSPECIFIED MIGRAINE TYPE: ICD-10-CM

## 2018-01-16 DIAGNOSIS — M79.18 MYOFASCIAL MUSCLE PAIN: Primary | ICD-10-CM

## 2018-01-16 DIAGNOSIS — M79.7 FIBROMYALGIA: ICD-10-CM

## 2018-01-16 RX ORDER — GABAPENTIN 300 MG/1
600 CAPSULE ORAL 3 TIMES DAILY
Qty: 180 CAP | Refills: 5 | Status: SHIPPED | OUTPATIENT
Start: 2018-01-16 | End: 2018-02-13 | Stop reason: SDUPTHER

## 2018-01-16 RX ORDER — RIZATRIPTAN BENZOATE 10 MG/1
10 TABLET, ORALLY DISINTEGRATING ORAL
Qty: 10 TAB | Refills: 2 | Status: SHIPPED | OUTPATIENT
Start: 2018-01-16 | End: 2022-06-07

## 2018-01-16 RX ORDER — BUTALBITAL, ACETAMINOPHEN AND CAFFEINE 50; 325; 40 MG/1; MG/1; MG/1
1 TABLET ORAL
Qty: 40 TAB | Refills: 1 | Status: SHIPPED | OUTPATIENT
Start: 2018-01-16 | End: 2018-09-24 | Stop reason: SDUPTHER

## 2018-01-16 RX ORDER — TRAMADOL HYDROCHLORIDE 50 MG/1
50 TABLET ORAL
Qty: 30 TAB | Refills: 0 | Status: SHIPPED | OUTPATIENT
Start: 2018-01-16 | End: 2018-03-01 | Stop reason: SDUPTHER

## 2018-01-16 NOTE — PROGRESS NOTES
HISTORY OF PRESENT ILLNESS  Hortensia Guevara is a 49 y.o. female. HPI  Low Back Pain:  Hortensia Guevara is a 49 y.o. female who complains of low back pain on the right for 1 week(s), is positional with bending or lifting, with radiation down the R thigh. Has had chronic R low back pain with some excacerbation. Severity of pain is 10 out of 10.  numbness, tingling, weakness is not present in right leg(s)/ foot. Precipitating factors: none recalled by the patient. Prior history of back problems: recurrent self limited episodes of low back pain in the past.  Self treatment:  medication not used . The patient denies fevers, chills or sweats. The patient denies bowel/bladder incontinence and saddle numbness. Migraine hx:    Hortensia Guevara has hx of Migraine type headaches. Frequency of headache is 2 per month(s). Migraine triggers: not clear  she does not reports aura symptoms:    headache is unilateral, is pounding, is accompanied by photophobia, phonophobia. Nausea is associated with the headaches. she is completely disabled by the headache. Headaches normally last about 2 day(s) sometimes  Medications used for aborting headache include: none  she is not on migraine prophylaxis: she has been using fioricet for salvage for years. Current treatment strategy is not effective.      Patient Active Problem List   Diagnosis Code    Anxiety F41.9    Depression F32.9    Fibromyalgia M79.7    Hypothyroidism due to acquired atrophy of thyroid E03.4    Status following gastric bypass for weight loss Z98.84    Incisional hernia, without obstruction or gangrene K43.2     Past Medical History:   Diagnosis Date    Arthritis     Autoimmune disease ([facility]     FIBROMYALGIA    BMI 45.0-49.9, adult ([facility]     Chronic pain     fibromyalgia    Depression     and anxiety    Diabetes ([facility]     HX NIDDM (NO MEDS 4-12-17)    Fibromyalgia     GERD (gastroesophageal reflux disease) 2013    H/O seasonal allergies     Headache(784.0)     Hypertension     HX HTN, NO MEDS(4-12-17)    Migraines     Morbid obesity ([facility]     HX: GASTRIC BYPASS    LINO on CPAP     NOT CURRENTLY USING CPAP (SINCE 5/2017)    Thyroid disease     HYPOTHYROID     Allergies   Allergen Reactions    Sulfa (Sulfonamide Antibiotics) Anaphylaxis and Other (comments)     Difficulty breathing/high fever/delusional      Aleve [Naproxen Sodium] Nausea Only    Codeine Nausea Only    Ibuprofen Hives    Lactose Other (comments)     Gas     Penicillin G Hives     Has had cephalexin without issue. Current Outpatient Prescriptions on File Prior to Visit   Medication Sig Dispense Refill    famotidine (PEPCID) 40 mg tablet TAKE 1 TAB BY MOUTH TWO (2) TIMES A DAY. INDICATIONS: GASTROESOPHAGEAL REFLUX DISEASE  Indications: gastroesophageal reflux disease 180 Tab 3    buPROPion SR (WELLBUTRIN SR) 100 mg SR tablet TAKE 1 TABLET BY MOUTH EVERY DAY 30 Tab 5    escitalopram oxalate (LEXAPRO) 10 mg tablet Take 1 Tab by mouth daily. 30 Tab 5    hyoscyamine SL (LEVSIN/SL) 0.125 mg SL tablet TAKE 1 TAB BY SUBLINGUAL ROUTE EVERY FOUR (4) HOURS AS NEEDED FOR CRAMPING. 30 Tab 1    SYNTHROID 175 mcg tablet TAKE 1 TABLET BY MOUTH EVERY DAY 30 Tab 5    ondansetron (ZOFRAN ODT) 4 mg disintegrating tablet TAKE 1 TABLET BY MOUTH EVERY 8 HOURS AS NEEDED FOR NAUSEA 30 Tab 0    cyanocobalamin 1,000 mcg tablet Take 5,000 mcg by mouth daily.  pediatric multivitamin no.76 (FLINTSTONES COMPLETE) chew Take 1 Tab by mouth two (2) times a day.  polyethylene glycol (MIRALAX) 17 gram packet Take 17 g by mouth daily.  cyclobenzaprine (FLEXERIL) 10 mg tablet TAKE 1 TABLET BY MOUTH 3 TIMES A DAY AS NEEDED MUSCLE SPASM (Patient taking differently: TAKE 1 TABLET BY MOUTH 3 TIMES A DAY AS NEEDED MUSCLE SPASM (TAKES EVERY BEDTIME)) 90 Tab 5    busPIRone (BUSPAR) 5 mg tablet Take 5 mg by mouth as needed.   0    ascorbic acid, vitamin C, (VITAMIN C) 250 mg tablet Take 500 mg by mouth two (2) times a day.  LACTOBACILLUS RHAMNOSUS GG (CULTURELLE PO) Take 1 Tab by mouth daily.  magnesium 250 mg tab Take 500 mg by mouth daily.  calcium citrate-vitamin d3 (CITRACAL+D) 315-200 mg-unit tab Take 2 Tabs by mouth two (2) times daily (with meals). Indications: Pt states, \"medication has been change to take at noon and dinner. \"      cetirizine (ZYRTEC) 10 mg tablet Take  by mouth nightly.  wheat dextrin (BENEFIBER CLEAR) 3 gram/3.5 gram powd Take  by mouth daily. 2 Teaspoons daily AT 7AM AND 5PM      cholecalciferol, VITAMIN D3, (VITAMIN D3) 5,000 unit tab tablet Take 5,000 Units by mouth two (2) times a day. LIQUID SOFT GEL      ketoconazole (NIZORAL) 2 % topical cream Apply  to affected area daily. (Patient taking differently: Apply  to affected area as needed.) 30 g 0    fluticasone (FLONASE) 50 mcg/actuation nasal spray 2 Sprays by Both Nostrils route nightly as needed for Rhinitis (as needed).  peg 400-propylene glycol (SYSTANE) 0.4-0.3 % drop Administer 1 Drop to both eyes as needed.  OTHER C-PAP Machine       No current facility-administered medications on file prior to visit. Social History   Substance Use Topics    Smoking status: Former Smoker     Packs/day: 1.00     Years: 20.00     Types: Cigarettes     Quit date: 3/4/2005    Smokeless tobacco: Never Used    Alcohol use No         ROS    Physical Exam   Musculoskeletal:        Legs:  Moderate tenderness with some focal trigger point      Current vital are:   Visit Vitals    /88 (BP 1 Location: Left arm, BP Patient Position: Sitting)    Pulse 63    Temp 98 °F (36.7 °C)    Resp 17    Ht 5' 3\" (1.6 m)    Wt 168 lb (76.2 kg)    LMP 01/29/2011    SpO2 99%    BMI 29.76 kg/m2      Patient appears to be in moderate pain, antalgic gait noted. Lumbosacral spine area reveals moderate local tenderness. Painful LS ROM noted.  Spinal alignment is normal.  Straight leg raise is negative at 90 degrees on both sides. Lumbosacral distribution DTR's, motor strength and sensation are normal, including heel and toe gait. Peripheral pulses are palpable and 2 plus. Prior studies inlcude: Lumbar spine X-Ray: not indicated. MRI not indicated    ASSESSMENT and PLAN  Diagnoses and all orders for this visit:    1. Myofascial muscle pain - increase gabapentin to 600 tid,  Refer to PT for possible dry needling?  -     gabapentin (NEURONTIN) 300 mg capsule; Take 2 Caps by mouth three (3) times daily. -     traMADol (ULTRAM) 50 mg tablet; Take 1 Tab by mouth every six (6) hours as needed for Pain. Max Daily Amount: 200 mg.  -     REFERRAL TO PHYSICAL THERAPY    2. Fibromyalgia  -     gabapentin (NEURONTIN) 300 mg capsule; Take 2 Caps by mouth three (3) times daily. -     traMADol (ULTRAM) 50 mg tablet; Take 1 Tab by mouth every six (6) hours as needed for Pain. Max Daily Amount: 200 mg.    3. Migraine without status migrainosus, not intractable, unspecified migraine type -increased frequency and duration. Can try abortive medication.  -     butalbital-acetaminophen-caffeine (FIORICET, ESGIC) -40 mg per tablet; Take 1 Tab by mouth every six (6) hours as needed for Pain. -     traMADol (ULTRAM) 50 mg tablet; Take 1 Tab by mouth every six (6) hours as needed for Pain. Max Daily Amount: 200 mg.  -     rizatriptan (MAXALT-MLT) 10 mg disintegrating tablet; Take 1 Tab by mouth once as needed for Migraine for up to 1 dose.       Follow-up Disposition: Not on File

## 2018-01-23 ENCOUNTER — APPOINTMENT (OUTPATIENT)
Dept: PHYSICAL THERAPY | Age: 48
End: 2018-01-23

## 2018-01-24 ENCOUNTER — OFFICE VISIT (OUTPATIENT)
Dept: SURGERY | Age: 48
End: 2018-01-24

## 2018-01-24 VITALS
OXYGEN SATURATION: 98 % | RESPIRATION RATE: 20 BRPM | WEIGHT: 163.5 LBS | DIASTOLIC BLOOD PRESSURE: 84 MMHG | TEMPERATURE: 98.1 F | HEIGHT: 63 IN | BODY MASS INDEX: 28.97 KG/M2 | HEART RATE: 80 BPM | SYSTOLIC BLOOD PRESSURE: 124 MMHG

## 2018-01-24 DIAGNOSIS — K90.9 INTESTINAL MALABSORPTION, UNSPECIFIED TYPE: ICD-10-CM

## 2018-01-24 DIAGNOSIS — Z98.84 STATUS FOLLOWING GASTRIC BYPASS FOR WEIGHT LOSS: ICD-10-CM

## 2018-01-24 DIAGNOSIS — E11.9 TYPE 2 DIABETES MELLITUS WITHOUT COMPLICATION, WITHOUT LONG-TERM CURRENT USE OF INSULIN (HCC): ICD-10-CM

## 2018-01-24 DIAGNOSIS — E66.01 MORBID OBESITY (HCC): Primary | ICD-10-CM

## 2018-01-24 NOTE — PROGRESS NOTES
1. Have you been to the ER, urgent care clinic since your last visit? Hospitalized since your last visit? No    2. Have you seen or consulted any other health care providers outside of the [address] [place] [address] since your last visit? Include any pap smears or colon screening.  No

## 2018-01-24 NOTE — MR AVS SNAPSHOT
[address] [facility] [address] [address] 
[phone] Patient: Christiane Blackwell MRN: [id number] XOV:[id number] Visit Information Date & Time Provider Department Dept. Phone Encounter #  
 1/24/2018 11:40 AM Talib Bates NP [place] [address] [phone] [id number] Follow-up Instructions Return in about 6 months (around 7/24/2018). Your Appointments 2/13/2018  8:00 AM  
ROUTINE CARE with West Quiros MD  
[facility]) Appt Note: 4 wk [address] [facility] [address]  
[phone]  
  
   
 [address] Waters [address] [address]  
  
    
 5/7/2018  8:45 AM  
ROUTINE CARE with West Quiros MD  
[facility]) Appt Note: MyChart- Routine follow up // 11/13  bw  
 [address] [facility]  
[phone] Upcoming Health Maintenance Date Due  
 FOOT EXAM Q1 5/31/1979 MICROALBUMIN Q1 10/13/2016 EYE EXAM RETINAL OR DILATED Q1 2/8/2017 HEMOGLOBIN A1C Q6M 5/2/2017 LIPID PANEL Q1 6/27/2017 DTaP/Tdap/Td series (2 - Td) 11/12/2023 Allergies as of 1/24/2018  Review Complete On: 1/24/2018 By: Quyen Santos LPN Severity Noted Reaction Type Reactions Sulfa (Sulfonamide Antibiotics) High 03/04/2011   Side Effect Anaphylaxis, Other (comments) Difficulty breathing/high fever/delusional    
 Aleve [Naproxen Sodium]  09/30/2013    Nausea Only Codeine  03/04/2011   Side Effect Nausea Only Ibuprofen  03/04/2011   Side Effect Hives Lactose  02/23/2016   Intolerance Other (comments) Gas Penicillin G  03/04/2011   Side Effect Hives Has had cephalexin without issue. Current Immunizations  Reviewed on 7/11/2016 Name Date Influenza Vaccine 11/12/2013 Influenza Vaccine (Quad) PF 11/6/2017, 10/13/2015 Influenza Vaccine (Whole Virus) 3/1/2013 Influenza Vaccine PF 10/23/2014 Influenza Vaccine Split 11/2/2011 Pneumococcal Polysaccharide (PPSV-23) 3/11/2015 Tdap 11/12/2013 Not reviewed this visit You Were Diagnosed With   
  
 Codes Comments Morbid obesity ([facility]    -  Primary ICD-10-CM: E66.01 
ICD-9-CM: 278.01 Status following gastric bypass for weight loss     ICD-10-CM: W93.38 ICD-9-CM: V45.86 Type 2 diabetes mellitus without complication, without long-term current use of insulin (HCC)     ICD-10-CM: E11.9 ICD-9-CM: 250.00 Intestinal malabsorption, unspecified type     ICD-10-CM: K90.9 ICD-9-CM: 579.9 BMI 28.0-28.9,adult     ICD-10-CM: P45.94 
ICD-9-CM: V85.24 Vitals BP Pulse Temp Resp Height(growth percentile) Weight(growth percentile) 124/84 80 98.1 °F (36.7 °C) 20 5' 3\" (1.6 m) 163 lb 8 oz (74.2 kg) LMP SpO2 BMI OB Status Smoking Status 01/29/2011 98% 28.96 kg/m2 Hysterectomy Former Smoker Vitals History BMI and BSA Data Body Mass Index Body Surface Area  
 28.96 kg/m 2 1.82 m 2 Preferred Pharmacy Pharmacy Name Phone [id number] [phone] Your Updated Medication List  
  
   
This list is accurate as of: 1/24/18 12:11 PM.  Always use your most recent med list.  
  
  
  
  
 ascorbic acid (vitamin C) 250 mg tablet Commonly known as:  VITAMIN C Take 500 mg by mouth two (2) times a day. Benefiber Clear 3 gram/3.5 gram Powd Generic drug:  wheat dextrin Take  by mouth daily. 2 Teaspoons daily AT 7AM AND 5PM  
  
 buPROPion  mg SR tablet Commonly known as:  WELLBUTRIN SR  
TAKE 1 TABLET BY MOUTH EVERY DAY  
  
 busPIRone 5 mg tablet Commonly known as:  BUSPAR Take 5 mg by mouth as needed. butalbital-acetaminophen-caffeine -40 mg per tablet Commonly known as:  Kaleigh Lefort  
 Take 1 Tab by mouth every six (6) hours as needed for Pain. calcium citrate-vitamin d3 315-200 mg-unit Tab Commonly known as:  CITRACAL+D Take 2 Tabs by mouth two (2) times daily (with meals). Indications: Pt states, \"medication has been change to take at noon and dinner. \"  
  
 cetirizine 10 mg tablet Commonly known as:  ZYRTEC Take  by mouth nightly. CULTURELLE PO Take 1 Tab by mouth daily. cyanocobalamin 1,000 mcg tablet Take 5,000 mcg by mouth daily. cyclobenzaprine 10 mg tablet Commonly known as:  FLEXERIL  
TAKE 1 TABLET BY MOUTH 3 TIMES A DAY AS NEEDED MUSCLE SPASM  
  
 escitalopram oxalate 10 mg tablet Commonly known as:  Ky Lemmings Take 1 Tab by mouth daily. famotidine 40 mg tablet Commonly known as:  PEPCID  
TAKE 1 TAB BY MOUTH TWO (2) TIMES A DAY. INDICATIONS: GASTROESOPHAGEAL REFLUX DISEASE  Indications: gastroesophageal reflux disease FLINTSTONES COMPLETE Chew Generic drug:  pediatric multivitamin no.76 Take 1 Tab by mouth two (2) times a day. FLONASE 50 mcg/actuation nasal spray Generic drug:  fluticasone 2 Sprays by Both Nostrils route nightly as needed for Rhinitis (as needed). * gabapentin 300 mg capsule Commonly known as:  NEURONTIN Take 2 Caps by mouth three (3) times daily. * gabapentin 300 mg capsule Commonly known as:  NEURONTIN  
TAKE ONE CAPSULE BY MOUTH IN THE MORNING, 1 CAPSULE AT NOON, AND 2 CAPSULES AT BEDTIME  
  
 hyoscyamine SL 0.125 mg SL tablet Commonly known as:  LEVSIN/SL TAKE 1 TAB BY SUBLINGUAL ROUTE EVERY FOUR (4) HOURS AS NEEDED FOR CRAMPING.  
  
 ketoconazole 2 % topical cream  
Commonly known as:  NIZORAL Apply  to affected area daily. magnesium 250 mg Tab Take 500 mg by mouth daily. MIRALAX 17 gram packet Generic drug:  polyethylene glycol Take 17 g by mouth daily. ondansetron 4 mg disintegrating tablet Commonly known as:  ZOFRAN ODT  
 TAKE 1 TABLET BY MOUTH EVERY 8 HOURS AS NEEDED FOR NAUSEA  
  
 OTHER  
C-PAP Machine  
  
 rizatriptan 10 mg disintegrating tablet Commonly known as:  MAXALT-MLT Take 1 Tab by mouth once as needed for Migraine for up to 1 dose. SYNTHROID 175 mcg tablet Generic drug:  levothyroxine TAKE 1 TABLET BY MOUTH EVERY DAY  
  
 SYSTANE (PROPYLENE GLYCOL) 0.4-0.3 % Drop Generic drug:  peg 400-propylene glycol Administer 1 Drop to both eyes as needed. traMADol 50 mg tablet Commonly known as:  ULTRAM  
Take 1 Tab by mouth every six (6) hours as needed for Pain. Max Daily Amount: 200 mg. VITAMIN D3 5,000 unit Tab tablet Generic drug:  cholecalciferol (VITAMIN D3) Take 5,000 Units by mouth two (2) times a day. LIQUID SOFT GEL * Notice: This list has 2 medication(s) that are the same as other medications prescribed for you. Read the directions carefully, and ask your doctor or other care provider to review them with you. We Performed the Following HEMOGLOBIN A1C WITH EAG [50810 CPT(R)] Follow-up Instructions Return in about 6 months (around 7/24/2018). Patient Instructions Reactive Hypoglycemia Reactive hypoglycemia is a medical term describing recurrent episodes of symptomatic hypoglycemia (low blood sugar) occurring 2-4 hours after eating a high carbohydrate meal. 
 
There are two kinds of hypoglycemia, FASTING and REACTIVE. The REACTIVE type is more common and happens when the blood sugar falls too low after a meal or snack. FASTING hypoglycemia occurs when the stomach is empty and no food has been eaten for several hours. People with REACTIVE hypoglycemia still produce insulin through their pancreas. Insulin is the hormone that is needed to regulate blood glucose (blood sugar).  It's not as efficient as it should be, resulting in too much production of insulin in the blood stream, causing the blood sugar to drop too low. It's the body's inability to handle large amounts of sugar (in some cases even small amounts) that is consumed throughout the day. Some symptoms of REACTIVE hypoglycemia may be: 
  
 * SWEATING  * HUNGER 
 * DROWSINES  * DIFFICULTY SPEAKING 
 * ANXIETY   * CONFUSION 
 * NAUSEA   * TREMBLING 
 * HEADACHES  * DIZZINESS 
 * IRRITABILITY  * CRAVING SWEETS 
 * RUNNY NOSE  * DEPRESSION 
 * FEELING WARM OR FLUSHED * INABILITY TO CONCENTRATE In most patients, REACTIVE hypoglycemia is completely avoidable and can be managed with dietary changes. GLYCEMIC INDEX The GLYCEMIC INDEX (GI) is a measure of the effects of carbohydrates on blood sugar levels. Carbohydrates that break down easily and quickly during digestion, release glucose rapidly into the bloodstream are HIGH GI FOODS. Carbohydrates that break down more slowly have a LOW GI rating. Foods that are LOW GI are high in fiber. Protein rich foods are LOW GI. Foods that are highly processes are usually HIGH GI foods. Most starchy, sugary, snack foods are HIGH GI and should be avoided. LOW GI (Good choices)  Most vegetables ok (except          potatoes, corn, peas and               carrots). Whole fresh fruit like        apples, berries, grapes,                 pineapple (Avoid dries fruit, fruit    juices, watermelon, bananas). High fiber breads). High fiber        breads (>3 grams per slice);         legumes (navy beans, dubois           beans, kidney beans, lentils,     etc); low-fat dairy products.  
  
MEDIUM GI  Whole wheat products, sweet       potato, brown rice, plain oatmeal. 
  
 
HIGH GI (AVOID)  Corn flakes, Rice Krispies, rice     cakes, popcorn, pretzels, snack    crackers, animal crackers, fig        bars, dried fruits, fruit juices,          white rice, white pastas, bagels,    breads, rolls, white/baked              potatoes, grits, muffins, pancakes, cookies, candies,         cakes, sugary drinks, alcohol,        any highly processed sugary        cereal, snack food or        convenience food. ** Eat 4-5 small meals daily. Make sure each mini meal has protein and fiber. Choose foods with a LOW GLYCEMIC INDEX and you can avoid symptoms of REACTIVE HYPOGLYCEMIA and feel better! TIP #1 Treatment of REACTIVE hypoglycemia is an ongoing process;         remember to have a good diet with evenly spaced meals   throughout the day. Tip #2 Daily exercise can help stabilize blood sugars and help control the   symptoms of hypoglycemia. Tip #3 Choose protein and fiber rich foods every meal. Lean meats or   seafood, low fat dairy products and colorful vegetables are great,   choices. Tip #4 If you experience a low blood sugar, drink 1/2 cup of skim milk   (fat-free). 1% milk or eat a light yogurt. Ideally, try and eat something with some protein that can be easily ingested. A High protein Slimfast, Low-Carb [place] Instant Breakfast or Boost Glucose control will also work, as will 1/2 a protein bar. Tip #5 Avoid alcohol, caffeine, sugar and highly starchy foods such as white rice, bread, crackers, popcorn, potatoes, pretzels, and other snack type foods that are highly processed with very little nutritional benefit (empty calories). Tip #6 Avoid fruit juices and dried fruits. The natural sugar content is very high and will contribute to hypoglycemic episodes. Introducing [facility]! Dear Mariama Elizabeth: 
Thank you for requesting a [employer] account. Our records indicate that you already have an active [employer] account. You can access your account anytime at [email or web address]. [email or web address] Did you know that you can access your hospital and ER discharge instructions at any time in [employer]? You can also review all of your test results from your hospital stay or ER visit. Additional Information If you have questions, please visit the Frequently Asked Questions section of the [email or web address] website at [email or web address] Remember, [employer] is NOT to be used for urgent needs. For medical emergencies, dial 911. Now available from your iPhone and Android! Please provide this summary of care documentation to your next provider. Your primary care clinician is listed as Harper Gross. If you have any questions after today's visit, please call [phone].

## 2018-01-24 NOTE — PATIENT INSTRUCTIONS
Reactive Hypoglycemia    Reactive hypoglycemia is a medical term describing recurrent episodes of symptomatic hypoglycemia (low blood sugar) occurring 2-4 hours after eating a high carbohydrate meal.    There are two kinds of hypoglycemia, FASTING and REACTIVE. The REACTIVE type is more common and happens when the blood sugar falls too low after a meal or snack. FASTING hypoglycemia occurs when the stomach is empty and no food has been eaten for several hours. People with REACTIVE hypoglycemia still produce insulin through their pancreas. Insulin is the hormone that is needed to regulate blood glucose (blood sugar). It's not as efficient as it should be, resulting in too much production of insulin in the blood stream, causing the blood sugar to drop too low. It's the body's inability to handle large amounts of sugar (in some cases even small amounts) that is consumed throughout the day. Some symptoms of REACTIVE hypoglycemia may be:      * SWEATING  * HUNGER   * DROWSINES  * DIFFICULTY SPEAKING   * ANXIETY   * CONFUSION   * NAUSEA   * TREMBLING   * HEADACHES  * DIZZINESS   * IRRITABILITY  * CRAVING SWEETS   * RUNNY NOSE  * DEPRESSION   * FEELING WARM OR FLUSHED   * INABILITY TO CONCENTRATE      In most patients, REACTIVE hypoglycemia is completely avoidable and can be managed with dietary changes. GLYCEMIC INDEX    The GLYCEMIC INDEX (GI) is a measure of the effects of carbohydrates on blood sugar levels. Carbohydrates that break down easily and quickly during digestion, release glucose rapidly into the bloodstream are HIGH GI FOODS. Carbohydrates that break down more slowly have a LOW GI rating. Foods that are LOW GI are high in fiber. Protein rich foods are LOW GI. Foods that are highly processes are usually HIGH GI foods. Most starchy, sugary, snack foods are HIGH GI and should be avoided.     LOW GI (Good choices)  Most vegetables ok (except          potatoes, corn, peas and carrots). Whole fresh fruit like        apples, berries, grapes,                 pineapple (Avoid dries fruit, fruit    juices, watermelon, bananas). High fiber breads). High fiber        breads (>3 grams per slice);         legumes (navy beans, dubois           beans, kidney beans, lentils,     etc); low-fat dairy products. MEDIUM GI  Whole wheat products, sweet       potato, brown rice, plain oatmeal.       HIGH GI (AVOID)  Corn flakes, Rice Krispies, rice     cakes, popcorn, pretzels, snack    crackers, animal crackers, fig        bars, dried fruits, fruit juices,          white rice, white pastas, bagels,    breads, rolls, white/baked              potatoes, grits, muffins,                 pancakes, cookies, candies,         cakes, sugary drinks, alcohol,        any highly processed sugary        cereal, snack food or        convenience food. ** Eat 4-5 small meals daily. Make sure each mini meal has protein and fiber. Choose foods with a LOW GLYCEMIC INDEX and you can avoid symptoms of REACTIVE HYPOGLYCEMIA and feel better! TIP #1     Treatment of REACTIVE hypoglycemia is an ongoing process;         remember to have a good diet with evenly spaced meals   throughout the day. Tip #2   Daily exercise can help stabilize blood sugars and help control the   symptoms of hypoglycemia. Tip #3   Choose protein and fiber rich foods every meal. Lean meats or   seafood, low fat dairy products and colorful vegetables are great,   choices. Tip #4   If you experience a low blood sugar, drink 1/2 cup of skim milk   (fat-free). 1% milk or eat a light yogurt. Ideally, try and eat something with some protein that can be easily ingested. A High protein Slimfast, Low-Carb [place] Instant Breakfast or Boost Glucose control will also work, as will 1/2 a protein bar.        Tip #5   Avoid alcohol, caffeine, sugar and highly starchy foods such as white rice, bread, crackers, popcorn, potatoes, pretzels, and other snack type foods that are highly processed with very little nutritional benefit (empty calories). Tip #6   Avoid fruit juices and dried fruits. The natural sugar content is very high and will contribute to hypoglycemic episodes.

## 2018-01-24 NOTE — PROGRESS NOTES
Guille Hoffmann is a 49 y.o. female 1 year 6 months s/p lap gastric bypass down 88.5 pounds. Weight today is 163.5 pounds. Denies nausea, no vomiting, no heartburn/reflux. Denies dysphagia. No fever/no chills, no shortness of breath, no chest pain, and no abdominal pain. Tolerating all foods, except textured, dry meats. Admits to skipping meals or waiting too long to eat which causes nausea. Protein supplementation in use for breakfast with protein bar. Snacking with Bugles crackers. Drinking 60 ounces of water daily. Tolerating all vitamins and medications. Not checking blood glucose levels. Denies hypoglycemia. Exercising minimal due to some hip pain, trying to walk for exercise. No issues with urination. Bowel movements once daily that are formed. Stated last week has loose stools, thinking she had a virus. HPI    Review of Systems   Constitutional: Negative for chills, fever and malaise/fatigue. Respiratory: Negative for cough, sputum production and shortness of breath. Cardiovascular: Negative for chest pain, palpitations and leg swelling. Gastrointestinal: Positive for nausea. Negative for abdominal pain, blood in stool, constipation, diarrhea, heartburn and vomiting. Genitourinary: Negative for dysuria. Musculoskeletal: Positive for joint pain. Neurological: Negative for dizziness. Physical Exam   Constitutional: She is oriented to person, place, and time. She appears well-developed and well-nourished. No distress. Cardiovascular: Normal rate, regular rhythm and normal heart sounds. Pulmonary/Chest: Effort normal and breath sounds normal. No respiratory distress. She has no wheezes. She has no rales. Abdominal: Soft. Bowel sounds are normal. She exhibits no distension. There is no tenderness. There is no rebound and no guarding. Lap sites healed. No hernia/masses palpated   Musculoskeletal: Normal range of motion. She exhibits no edema.    Neurological: She is alert and oriented to person, place, and time. Skin: Skin is warm and dry. No rash noted. No erythema. Psychiatric: She has a normal mood and affect. Her behavior is normal. Thought content normal.   Blood pressure 124/84, pulse 80, temperature 98.1 °F (36.7 °C), resp. rate 20, height 5' 3\" (1.6 m), weight 163 lb 8 oz (74.2 kg), last menstrual period 01/29/2011, SpO2 98 %. ASSESSMENT and PLAN  Morbid Obesity 1 year 6 months s/p lap gastric bypass down 88.5 pounds. Weight today is 163.5 pounds. Advised patient regard to diet that is high-protein, low-fat, low-sugar, limited carbohydrates. Strive for 60 grams of protein daily. Advised patient to avoid skipping meals and be mindful of snacking choices. If having a snack, foods that are protein or fiber rich. Still pay attention to behavioral factor and habits. No eating/drinking together, chew foods well, and portion control. Drink at least 40 ounces of non-carbonated, non-calorie beverages daily. Continue vitamin regiment daily. Exercise at least 3 days a week with cardiovascular and strength training as tolerated. Provided patient with routine lab work slip. Advised anything concerning with labs, will contact patient prior to next visit. Patient to follow up in 6 months. Patient verbalized understanding and questions were answered to the best of my knowledge and ability. Reactive hypoglycemia educational materials were provided. Advised to call office if any questions/concerns. 17 minutes was spent with patient, greater than 50% of time spent counseling.

## 2018-01-31 ENCOUNTER — TELEPHONE (OUTPATIENT)
Dept: SURGERY | Age: 48
End: 2018-01-31

## 2018-02-01 RX ORDER — HYOSCYAMINE SULFATE 0.12 MG/1
TABLET SUBLINGUAL
Qty: 30 TAB | Refills: 1 | Status: SHIPPED | OUTPATIENT
Start: 2018-02-01 | End: 2018-06-21 | Stop reason: SDUPTHER

## 2018-02-13 ENCOUNTER — OFFICE VISIT (OUTPATIENT)
Dept: INTERNAL MEDICINE CLINIC | Age: 48
End: 2018-02-13

## 2018-02-13 VITALS
TEMPERATURE: 97.5 F | HEIGHT: 63 IN | DIASTOLIC BLOOD PRESSURE: 92 MMHG | OXYGEN SATURATION: 98 % | SYSTOLIC BLOOD PRESSURE: 146 MMHG | WEIGHT: 169.38 LBS | RESPIRATION RATE: 18 BRPM | HEART RATE: 65 BPM | BODY MASS INDEX: 30.01 KG/M2

## 2018-02-13 DIAGNOSIS — M79.7 FIBROMYALGIA: Primary | ICD-10-CM

## 2018-02-13 DIAGNOSIS — Z86.39 HISTORY OF DIABETES MELLITUS: ICD-10-CM

## 2018-02-13 DIAGNOSIS — M76.30 IT BAND SYNDROME, UNSPECIFIED LATERALITY: ICD-10-CM

## 2018-02-13 LAB — HBA1C MFR BLD HPLC: 5 %

## 2018-02-13 NOTE — PROGRESS NOTES
HISTORY OF PRESENT ILLNESS  Rosa Jesus is a 49 y.o. female. HPI  Problem follow up:  Rosa Jesus returns for follow up visit regarding low back pain, lateral and anterior thigh pains, fibromyalgia. she was seen 1 months ago in office diagnosed with fibromyalgia, myofasciitis and treated with referral to PT for dry needling. Workup was significant for same. Notes, labs, studies, imaging related to this problem during prior visit were available . Since that visit, she has not changed. she could not get coverage for PT here so went to orthopedics who injected her for GT bursitis. Helped lateral hip pain but thigh pain unchanged. Residual symptoms include: moderate anterior/lateral thigh pain  New issues associated with this problem include: none. ROS    Physical Exam   Musculoskeletal:        Right hip: She exhibits normal range of motion and no tenderness. Left hip: She exhibits normal range of motion and no tenderness. Right knee: She exhibits normal range of motion. No tenderness found. Left knee: She exhibits normal range of motion. No tenderness found. Back:         Right upper arm: She exhibits tenderness. Left upper arm: She exhibits tenderness. Arms:       Legs:  Moderate tenderness where depicted in red     Visit Vitals    BP (!) 146/92 (BP 1 Location: Left arm, BP Patient Position: Sitting)    Pulse 65    Temp 97.5 °F (36.4 °C) (Oral)    Resp 18    Ht 5' 3\" (1.6 m)    Wt 169 lb 6 oz (76.8 kg)    LMP 01/29/2011    SpO2 98%    BMI 30 kg/m2       ASSESSMENT and PLAN  Diagnoses and all orders for this visit:    1. Fibromyalgia - she has optimized gabapentin limited by sedation side effects. Will refer for accupuncture. We also discussed benefits of CBT. She will ask her counselor if that is available   -     REFERRAL TO ACUPUNCTURE    2. It band syndrome, unspecified laterality  -     REFERRAL TO ACUPUNCTURE    3.  History of diabetes mellitus - resolved with wt loss surgery.     -     AMB POC HEMOGLOBIN A1C      Follow-up Disposition: Not on File

## 2018-02-16 DIAGNOSIS — F41.9 ANXIETY: ICD-10-CM

## 2018-02-16 RX ORDER — ESCITALOPRAM OXALATE 10 MG/1
TABLET ORAL
Qty: 30 TAB | Refills: 5 | Status: SHIPPED | OUTPATIENT
Start: 2018-02-16 | End: 2018-10-21 | Stop reason: SDUPTHER

## 2018-03-01 DIAGNOSIS — M79.7 FIBROMYALGIA: ICD-10-CM

## 2018-03-01 DIAGNOSIS — G43.909 MIGRAINE WITHOUT STATUS MIGRAINOSUS, NOT INTRACTABLE, UNSPECIFIED MIGRAINE TYPE: ICD-10-CM

## 2018-03-01 DIAGNOSIS — M79.18 MYOFASCIAL MUSCLE PAIN: ICD-10-CM

## 2018-03-01 RX ORDER — TRAMADOL HYDROCHLORIDE 50 MG/1
50 TABLET ORAL
Qty: 30 TAB | Refills: 0 | OUTPATIENT
Start: 2018-03-01 | End: 2018-04-29 | Stop reason: SDUPTHER

## 2018-03-03 DIAGNOSIS — M54.50 CHRONIC MIDLINE LOW BACK PAIN WITHOUT SCIATICA: ICD-10-CM

## 2018-03-03 DIAGNOSIS — G89.29 CHRONIC MIDLINE LOW BACK PAIN WITHOUT SCIATICA: ICD-10-CM

## 2018-03-04 RX ORDER — CYCLOBENZAPRINE HCL 10 MG
TABLET ORAL
Qty: 90 TAB | Refills: 5 | Status: SHIPPED | OUTPATIENT
Start: 2018-03-04 | End: 2020-02-25

## 2018-03-17 RX ORDER — BUPROPION HYDROCHLORIDE 100 MG/1
TABLET, EXTENDED RELEASE ORAL
Qty: 30 TAB | Refills: 5 | Status: SHIPPED | OUTPATIENT
Start: 2018-03-17 | End: 2018-09-23 | Stop reason: SDUPTHER

## 2018-05-07 ENCOUNTER — OFFICE VISIT (OUTPATIENT)
Dept: INTERNAL MEDICINE CLINIC | Age: 48
End: 2018-05-07

## 2018-05-07 VITALS
HEIGHT: 63 IN | TEMPERATURE: 97.4 F | WEIGHT: 165.25 LBS | HEART RATE: 60 BPM | OXYGEN SATURATION: 97 % | BODY MASS INDEX: 29.28 KG/M2 | SYSTOLIC BLOOD PRESSURE: 126 MMHG | DIASTOLIC BLOOD PRESSURE: 85 MMHG | RESPIRATION RATE: 18 BRPM

## 2018-05-07 DIAGNOSIS — F41.8 SITUATIONAL ANXIETY: ICD-10-CM

## 2018-05-07 DIAGNOSIS — Z01.818 PREOP EXAMINATION: Primary | ICD-10-CM

## 2018-05-07 RX ORDER — MULTIVITAMIN
500 TABLET ORAL
COMMUNITY

## 2018-05-07 RX ORDER — MULTIVIT WITH MINERALS/HERBS
1 TABLET ORAL DAILY
COMMUNITY

## 2018-05-07 RX ORDER — ALPRAZOLAM 0.5 MG/1
0.5 TABLET ORAL
Qty: 20 TAB | Refills: 0 | Status: SHIPPED | OUTPATIENT
Start: 2018-05-07 | End: 2018-10-04 | Stop reason: SDUPTHER

## 2018-05-07 RX ORDER — GINKGO BILOBA LEAF EXTRACT 60 MG
CAPSULE ORAL
COMMUNITY

## 2018-05-07 RX ORDER — MENTHOL
1000 GEL (GRAM) TOPICAL DAILY
COMMUNITY
End: 2020-05-19

## 2018-05-07 NOTE — MR AVS SNAPSHOT
[address] 
 
 
 [address] [place] [address] [facility] 
[phone] Patient: Padmini You MRN: [id number] ROSELIA:1/14/2008 Visit Information Date & Time Provider Department Dept. Phone Encounter #  
 5/7/2018  8:45 AM Fernanda Gutierrez MD [facility] [address] [id number] Your Appointments 7/25/2018  9:00 AM  
ESTABLISHED PATIENT with Sudhir Kirkland NP  
[facility] [address] [address] ([address]) Appt Note: EP 6 month f/u  7/ll/l6 Lap Gastric Bypass w/EGD  
 [address] [facility] [id number]  
[address] [place] [address] Upcoming Health Maintenance Date Due  
 FOOT EXAM Q1 5/31/1979 MICROALBUMIN Q1 10/13/2016 EYE EXAM RETINAL OR DILATED Q1 2/8/2017 LIPID PANEL Q1 6/27/2017 Influenza Age 5 to Adult 8/1/2018 HEMOGLOBIN A1C Q6M 8/13/2018 DTaP/Tdap/Td series (2 - Td) 11/12/2023 Allergies as of 5/7/2018  Review Complete On: 5/7/2018 By: Fernanda Gutierrez MD  
  
 Severity Noted Reaction Type Reactions Sulfa (Sulfonamide Antibiotics) High 03/04/2011   Side Effect Anaphylaxis, Other (comments) Difficulty breathing/high fever/delusional    
 Aleve [Naproxen Sodium]  09/30/2013    Nausea Only Codeine  03/04/2011   Side Effect Nausea Only Ibuprofen  03/04/2011   Side Effect Hives Lactose  02/23/2016   Intolerance Other (comments) Gas Penicillin G  03/04/2011   Side Effect Hives Has had cephalexin without issue. Current Immunizations  Reviewed on 7/11/2016 Name Date Influenza Vaccine 11/12/2013 Influenza Vaccine (Quad) PF 11/6/2017, 10/13/2015 Influenza Vaccine (Whole Virus) 3/1/2013 Influenza Vaccine PF 10/23/2014 Influenza Vaccine Split 11/2/2011 Pneumococcal Polysaccharide (PPSV-23) 3/11/2015 Tdap 11/12/2013 Not reviewed this visit You Were Diagnosed With   
  
 Codes Comments Preop examination    -  Primary ICD-10-CM: R14.773 ICD-9-CM: V72.84 Situational anxiety     ICD-10-CM: F41.8 ICD-9-CM: 300.09 Vitals BP Pulse Temp Resp Height(growth percentile) Weight(growth percentile) 126/85 (BP 1 Location: Left arm, BP Patient Position: Sitting) 60 97.4 °F (36.3 °C) (Oral) 18 5' 3\" (1.6 m) 165 lb 4 oz (75 kg) LMP SpO2 BMI OB Status Smoking Status 01/29/2011 97% 29.27 kg/m2 Hysterectomy Former Smoker Vitals History BMI and BSA Data Body Mass Index Body Surface Area  
 29.27 kg/m 2 1.83 m 2 Preferred Pharmacy Pharmacy Name Phone [id number], [address] [phone] Your Updated Medication List  
  
   
This list is accurate as of 5/7/18  9:06 AM.  Always use your most recent med list.  
  
  
  
  
 ALPRAZolam 0.5 mg tablet Commonly known as:  Amara  Take 1 Tab by mouth three (3) times daily as needed for Anxiety. Max Daily Amount: 1.5 mg.  
  
 ascorbic acid (vitamin C) 250 mg tablet Commonly known as:  VITAMIN C Take 500 mg by mouth two (2) times a day. B COMPLEX 1 tablet Generic drug:  b complex vitamins Take 1 Tab by mouth daily. Benefiber Clear 3 gram/3.5 gram Powd Generic drug:  wheat dextrin Take  by mouth daily. 2 Teaspoons daily AT 7AM AND 5PM  
  
 buPROPion  mg SR tablet Commonly known as:  WELLBUTRIN SR  
TAKE 1 TABLET BY MOUTH EVERY DAY  
  
 busPIRone 5 mg tablet Commonly known as:  BUSPAR Take 5 mg by mouth as needed. butalbital-acetaminophen-caffeine -40 mg per tablet Commonly known as:  Emily Black Take 1 Tab by mouth every six (6) hours as needed for Pain. calcium citrate-vitamin d3 315-200 mg-unit Tab Commonly known as:  CITRACAL+D Take 2 Tabs by mouth two (2) times daily (with meals).  Indications: Pt states, \"medication has been change to take at noon and dinner. \"  
  
 cetirizine 10 mg tablet Commonly known as:  ZYRTEC Take  by mouth nightly. cinnamon bark 500 mg Cap Take 500 mg by mouth. COQ10 (UBIQUINOL) PO Take  by mouth. CULTURELLE PO Take 1 Tab by mouth daily. cyanocobalamin 1,000 mcg tablet Take 5,000 mcg by mouth daily. cyclobenzaprine 10 mg tablet Commonly known as:  FLEXERIL  
TAKE 1 TABLET BY MOUTH 3 TIMES A DAY AS NEEDED MUSCLE SPASM  
  
 echinacea purpurea root 80 mg Cap Take  by mouth.  
  
 escitalopram oxalate 10 mg tablet Commonly known as:  Festus Roof TAKE 1 TAB BY MOUTH DAILY. famotidine 40 mg tablet Commonly known as:  PEPCID  
TAKE 1 TAB BY MOUTH TWO (2) TIMES A DAY. INDICATIONS: GASTROESOPHAGEAL REFLUX DISEASE  Indications: gastroesophageal reflux disease FLINTSTONES COMPLETE Chew Generic drug:  pediatric multivitamin no.76 Take 1 Tab by mouth two (2) times a day. FLONASE 50 mcg/actuation nasal spray Generic drug:  fluticasone 2 Sprays by Both Nostrils route nightly as needed for Rhinitis (as needed). gabapentin 300 mg capsule Commonly known as:  NEURONTIN  
TAKE ONE CAPSULE BY MOUTH IN THE MORNING, 1 CAPSULE AT NOON, AND 2 CAPSULES AT BEDTIME  
  
 hyoscyamine SL 0.125 mg SL tablet Commonly known as:  LEVSIN/SL TAKE 1 TAB BY SUBLINGUAL ROUTE EVERY FOUR (4) HOURS AS NEEDED FOR CRAMPING.  
  
 ketoconazole 2 % topical cream  
Commonly known as:  NIZORAL Apply  to affected area daily. magnesium 250 mg Tab Take 500 mg by mouth daily. MIRALAX 17 gram packet Generic drug:  polyethylene glycol Take 17 g by mouth daily. ondansetron 4 mg disintegrating tablet Commonly known as:  ZOFRAN ODT  
TAKE 1 TABLET BY MOUTH EVERY 8 HOURS AS NEEDED FOR NAUSEA  
  
 rizatriptan 10 mg disintegrating tablet Commonly known as:  MAXALT-MLT Take 1 Tab by mouth once as needed for Migraine for up to 1 dose. SYNTHROID 175 mcg tablet Generic drug:  levothyroxine TAKE 1 TABLET BY MOUTH EVERY DAY  
  
 SYSTANE (PROPYLENE GLYCOL) 0.4-0.3 % Drop Generic drug:  peg 400-propylene glycol Administer 1 Drop to both eyes as needed. traMADol 50 mg tablet Commonly known as:  ULTRAM  
Take 1 Tab by mouth every six (6) hours as needed for Pain. Max Daily Amount: 200 mg. VITAMIN D3 5,000 unit Tab tablet Generic drug:  cholecalciferol (VITAMIN D3) Take 5,000 Units by mouth two (2) times a day. LIQUID SOFT GEL  
  
 vitamin e 1,000 unit capsule Commonly known as:  E GEMS Take 1,000 Units by mouth daily. Prescriptions Printed Refills ALPRAZolam (XANAX) 0.5 mg tablet 0 Sig: Take 1 Tab by mouth three (3) times daily as needed for Anxiety. Max Daily Amount: 1.5 mg.  
 Class: Print Route: Oral  
  
Introducing [facility]! Dear Ro Mendez: 
Thank you for requesting a [facility] account. Our records indicate that you already have an active [facility] account. You can access your account anytime at [email or web address] Did you know that you can access your hospital and ER discharge instructions at any time in [facility]? You can also review all of your test results from your hospital stay or ER visit. Additional Information If you have questions, please visit the Frequently Asked Questions section of the [facility] website at [email or web address] Remember, [facility] is NOT to be used for urgent needs. For medical emergencies, dial 911. Now available from your iPhone and Android! Please provide this summary of care documentation to your next provider. Your primary care clinician is listed as Prudencio Bradley. If you have any questions after today's visit, please call [phone].

## 2018-05-07 NOTE — PROGRESS NOTES
HISTORY OF PRESENT ILLNESS  Marcela Tadeo is a 49 y.o. female. HPI  Marcela Tadeo presents for preop evaluation:   Surgeon : Hattie Burkitt  Type of surgery : thumb [facility] arthroplasty with ligament reconstruction and tendon interposition  Surgery site : R thumb  Anesthesia type: Axillary nerve block  Date of procedure:  6/5/18    Allergies: Allergies   Allergen Reactions    Sulfa (Sulfonamide Antibiotics) Anaphylaxis and Other (comments)     Difficulty breathing/high fever/delusional      Aleve [Naproxen Sodium] Nausea Only    Codeine Nausea Only    Ibuprofen Hives    Lactose Other (comments)     Gas     Penicillin G Hives     Has had cephalexin without issue. Latex allergy: no    Current Outpatient Prescriptions   Medication Sig    vitamin e (E GEMS) 1,000 unit capsule Take 1,000 Units by mouth daily.  b complex vitamins (B COMPLEX 1) tablet Take 1 Tab by mouth daily.  COQ10, UBIQUINOL, PO Take  by mouth.  cinnamon bark 500 mg cap Take 500 mg by mouth.  echinacea purpurea root 80 mg cap Take  by mouth.  traMADol (ULTRAM) 50 mg tablet Take 1 Tab by mouth every six (6) hours as needed for Pain. Max Daily Amount: 200 mg.  buPROPion SR (WELLBUTRIN SR) 100 mg SR tablet TAKE 1 TABLET BY MOUTH EVERY DAY    cyclobenzaprine (FLEXERIL) 10 mg tablet TAKE 1 TABLET BY MOUTH 3 TIMES A DAY AS NEEDED MUSCLE SPASM    escitalopram oxalate (LEXAPRO) 10 mg tablet TAKE 1 TAB BY MOUTH DAILY.  hyoscyamine SL (LEVSIN/SL) 0.125 mg SL tablet TAKE 1 TAB BY SUBLINGUAL ROUTE EVERY FOUR (4) HOURS AS NEEDED FOR CRAMPING.  gabapentin (NEURONTIN) 300 mg capsule TAKE ONE CAPSULE BY MOUTH IN THE MORNING, 1 CAPSULE AT NOON, AND 2 CAPSULES AT BEDTIME    butalbital-acetaminophen-caffeine (FIORICET, ESGIC) -40 mg per tablet Take 1 Tab by mouth every six (6) hours as needed for Pain.  rizatriptan (MAXALT-MLT) 10 mg disintegrating tablet Take 1 Tab by mouth once as needed for Migraine for up to 1 dose.     famotidine (PEPCID) 40 mg tablet TAKE 1 TAB BY MOUTH TWO (2) TIMES A DAY. INDICATIONS: GASTROESOPHAGEAL REFLUX DISEASE  Indications: gastroesophageal reflux disease    SYNTHROID 175 mcg tablet TAKE 1 TABLET BY MOUTH EVERY DAY    ondansetron (ZOFRAN ODT) 4 mg disintegrating tablet TAKE 1 TABLET BY MOUTH EVERY 8 HOURS AS NEEDED FOR NAUSEA    cyanocobalamin 1,000 mcg tablet Take 5,000 mcg by mouth daily.  pediatric multivitamin no.76 (FLINTSTONES COMPLETE) chew Take 1 Tab by mouth two (2) times a day.  polyethylene glycol (MIRALAX) 17 gram packet Take 17 g by mouth daily.  busPIRone (BUSPAR) 5 mg tablet Take 5 mg by mouth as needed.  ascorbic acid, vitamin C, (VITAMIN C) 250 mg tablet Take 500 mg by mouth two (2) times a day.  LACTOBACILLUS RHAMNOSUS GG (CULTURELLE PO) Take 1 Tab by mouth daily.  magnesium 250 mg tab Take 500 mg by mouth daily.  calcium citrate-vitamin d3 (CITRACAL+D) 315-200 mg-unit tab Take 2 Tabs by mouth two (2) times daily (with meals). Indications: Pt states, \"medication has been change to take at noon and dinner. \"    cetirizine (ZYRTEC) 10 mg tablet Take  by mouth nightly.  wheat dextrin (BENEFIBER CLEAR) 3 gram/3.5 gram powd Take  by mouth daily. 2 Teaspoons daily AT 7AM AND 5PM    cholecalciferol, VITAMIN D3, (VITAMIN D3) 5,000 unit tab tablet Take 5,000 Units by mouth two (2) times a day. LIQUID SOFT GEL    ketoconazole (NIZORAL) 2 % topical cream Apply  to affected area daily. (Patient taking differently: Apply  to affected area as needed.)    fluticasone (FLONASE) 50 mcg/actuation nasal spray 2 Sprays by Both Nostrils route nightly as needed for Rhinitis (as needed).  peg 400-propylene glycol (SYSTANE) 0.4-0.3 % drop Administer 1 Drop to both eyes as needed. No current facility-administered medications for this visit.       Patient Active Problem List   Diagnosis Code    Anxiety F41.9    Depression F32.9    Fibromyalgia M79.7    Hypothyroidism due to acquired atrophy of thyroid E03.4    Status following gastric bypass for weight loss Z98.84    Incisional hernia, without obstruction or gangrene K43.2     Past Surgical History:   Procedure Laterality Date    HX GASTRIC BYPASS  07/11/2016    HX GYN      ECTOPIC PREGNANCE LEFT TUBE REMOVED    HX HERNIA REPAIR N/A 08/03/2017    lower midline and periumbilical, robotic incisional hernia repairs, Jose Juan    HX HYSTERECTOMY  2011    HX LAP CHOLECYSTECTOMY  04/20/2017    HX LAP GASTRIC BYPASS  7/11/16    by Dr Regina Willis    right ovarian tumor    HX TUBAL LIGATION       Social History   Substance Use Topics    Smoking status: Former Smoker     Packs/day: 1.00     Years: 20.00     Types: Cigarettes     Quit date: 3/4/2005    Smokeless tobacco: Never Used    Alcohol use No     Family History   Problem Relation Age of Onset    Diabetes Mother     Hypertension Mother     Crohn's Disease Mother     Asthma Mother     Lung Disease Mother      COPD    Migraines Mother    Charlanne Roup Mother      SKIN    Depression Mother     Other Mother      FIBROMYALGIA    Gout Mother    [place]ina [place] Mother     Cancer Father      oral    Diabetes Father      steroid induced    Liver Disease Father      HEP C    Alcohol abuse Sister     Heart Disease Sister     Migraines Sister     Depression Sister      BIPOLAR DISORDER    Anesth Problems Daughter      N&V    Asthma Daughter     Cancer Paternal Grandmother      colon           Review of Systems   Constitutional: Negative for chills, diaphoresis, fever, malaise/fatigue and weight loss. HENT: Negative for congestion and sore throat. Eyes: Negative for blurred vision and pain. Respiratory: Negative for cough, shortness of breath and wheezing. Cardiovascular: Negative for chest pain, palpitations, orthopnea, claudication and leg swelling.    Gastrointestinal: Negative for abdominal pain, blood in stool, constipation, diarrhea, heartburn, melena, nausea and vomiting. Genitourinary: Negative for dysuria, frequency, hematuria and urgency. Musculoskeletal: Negative for back pain, joint pain and myalgias. Skin: Negative for rash. Neurological: Negative for dizziness, sensory change, focal weakness, loss of consciousness, weakness and headaches. Endo/Heme/Allergies: Negative for environmental allergies. Does not bruise/bleed easily. Physical Exam   Constitutional: She appears well-developed and well-nourished. No distress. /85 (BP 1 Location: Left arm, BP Patient Position: Sitting)  Pulse 60  Temp 97.4 °F (36.3 °C) (Oral)   Resp 18  Ht 5' 3\" (1.6 m)  Wt 165 lb 4 oz (75 kg)  LMP 01/29/2011  SpO2 97%  BMI 29.27 kg/m2Body mass index is 29.27 kg/(m^2). HENT:   Mouth/Throat: Oropharynx is clear and moist.   Neck: No JVD present. Carotid bruit is not present. Cardiovascular: Normal rate, regular rhythm, normal heart sounds and intact distal pulses. Pulmonary/Chest: Effort normal and breath sounds normal.   Abdominal: Soft. Normal appearance and bowel sounds are normal. There is no tenderness. Musculoskeletal: She exhibits no edema. Neurological: She is alert. Skin: Skin is warm and dry. She is not diaphoretic. Nursing note and vitals reviewed. ASSESSMENT and PLAN  Diagnoses and all orders for this visit:    1. Preop examination - letter to Dr. Eliot Moore    2. Situational anxiety - she will be traveling with family which raises her anxiety level. Would like short term anxiolytic. -     ALPRAZolam (XANAX) 0.5 mg tablet; Take 1 Tab by mouth three (3) times daily as needed for Anxiety. Max Daily Amount: 1.5 mg.       Follow-up Disposition: Not on File  lab results and schedule of future lab studies reviewed with patient

## 2018-05-22 RX ORDER — ONDANSETRON 4 MG/1
TABLET, ORALLY DISINTEGRATING ORAL
Qty: 30 TAB | Refills: 1 | Status: SHIPPED | OUTPATIENT
Start: 2018-05-22 | End: 2019-02-28 | Stop reason: ALTCHOICE

## 2018-06-21 DIAGNOSIS — G43.909 MIGRAINE WITHOUT STATUS MIGRAINOSUS, NOT INTRACTABLE, UNSPECIFIED MIGRAINE TYPE: ICD-10-CM

## 2018-06-21 DIAGNOSIS — M79.18 MYOFASCIAL MUSCLE PAIN: ICD-10-CM

## 2018-06-21 DIAGNOSIS — M79.7 FIBROMYALGIA: ICD-10-CM

## 2018-06-21 RX ORDER — HYOSCYAMINE SULFATE 0.12 MG/1
TABLET SUBLINGUAL
Qty: 30 TAB | Refills: 1 | Status: SHIPPED | OUTPATIENT
Start: 2018-06-21 | End: 2019-04-20 | Stop reason: SDUPTHER

## 2018-06-21 RX ORDER — TRAMADOL HYDROCHLORIDE 50 MG/1
50 TABLET ORAL
Qty: 30 TAB | Refills: 0 | OUTPATIENT
Start: 2018-06-21 | End: 2018-08-02 | Stop reason: SDUPTHER

## 2018-06-21 NOTE — TELEPHONE ENCOUNTER
From: Spenser Garcia  To: Ingrid Rodriguez NP  Sent: 6/21/2018 1:37 PM EDT  Subject: Medication Renewal Request    Original authorizing provider: RAPHAEL Andre would like a refill of the following medications:  hyoscyamine SL (LEVSIN/SL) 0.125 mg SL tablet Ingrid Rodriguez NP]    Preferred pharmacy: [id number]- [place], [address]    Comment:      Medication renewals requested in this message routed to other providers:  traMADol (ULTRAM) 50 mg tablet Fredi Vaughn MD]

## 2018-06-21 NOTE — TELEPHONE ENCOUNTER
From: Jaden Clayton  To: Azul Davila MD  Sent: 6/21/2018 1:37 PM EDT  Subject: Medication Renewal Request    Original authorizing provider: MD Mariano Morales.  Ani Zimmer would like a refill of the following medications:  traMADol (ULTRAM) 50 mg tablet Azul Davila MD]    Preferred pharmacy: [id number], [address]    Comment:      Medication renewals requested in this message routed to other providers:  hyoscyamine SL (LEVSIN/SL) 0.125 mg SL tablet Mau Seay NP]

## 2018-06-22 NOTE — TELEPHONE ENCOUNTER
Verbal order per Dr. Torey Solares for calling in medications   Requested Prescriptions     Signed Prescriptions Disp Refills    traMADol (ULTRAM) 50 mg tablet 30 Tab 0     Sig: Take 1 Tab by mouth every six (6) hours as needed for Pain. Max Daily Amount: 200 mg. Authorizing Provider: Eric Crum           Phone in.

## 2018-07-20 DIAGNOSIS — M79.7 FIBROMYALGIA: ICD-10-CM

## 2018-07-20 DIAGNOSIS — M79.18 MYOFASCIAL MUSCLE PAIN: ICD-10-CM

## 2018-07-21 RX ORDER — GABAPENTIN 300 MG/1
CAPSULE ORAL
Qty: 180 CAP | Refills: 4 | Status: SHIPPED | OUTPATIENT
Start: 2018-07-21 | End: 2018-12-21 | Stop reason: SDUPTHER

## 2018-07-25 ENCOUNTER — OFFICE VISIT (OUTPATIENT)
Dept: SURGERY | Age: 48
End: 2018-07-25

## 2018-07-25 VITALS
DIASTOLIC BLOOD PRESSURE: 80 MMHG | TEMPERATURE: 98.1 F | HEIGHT: 63 IN | WEIGHT: 168.5 LBS | HEART RATE: 58 BPM | RESPIRATION RATE: 20 BRPM | OXYGEN SATURATION: 97 % | SYSTOLIC BLOOD PRESSURE: 130 MMHG | BODY MASS INDEX: 29.86 KG/M2

## 2018-07-25 DIAGNOSIS — E53.8 VITAMIN B12 DEFICIENCY: ICD-10-CM

## 2018-07-25 DIAGNOSIS — E66.01 MORBID OBESITY (HCC): Primary | ICD-10-CM

## 2018-07-25 DIAGNOSIS — E55.9 VITAMIN D DEFICIENCY: ICD-10-CM

## 2018-07-25 DIAGNOSIS — K90.9 INTESTINAL MALABSORPTION, UNSPECIFIED TYPE: ICD-10-CM

## 2018-07-25 DIAGNOSIS — D50.9 IRON DEFICIENCY ANEMIA, UNSPECIFIED IRON DEFICIENCY ANEMIA TYPE: ICD-10-CM

## 2018-07-25 DIAGNOSIS — Z98.84 S/P GASTRIC BYPASS: ICD-10-CM

## 2018-07-25 PROBLEM — K43.2 INCISIONAL HERNIA, WITHOUT OBSTRUCTION OR GANGRENE: Status: RESOLVED | Noted: 2017-08-03 | Resolved: 2018-07-25

## 2018-07-25 NOTE — MR AVS SNAPSHOT
[address] [address] 
[phone] Patient: Emiliano Young MRN: [id number] XTI:[id number] Visit Information Date & Time Provider Department Dept. Phone Encounter #  
 7/25/2018  9:00 AM Balwinder Bae NP [place] [address] [phone] [id number] Follow-up Instructions Return in about 6 months (around 1/25/2019). Your Appointments 11/7/2018  9:45 AM  
ROUTINE CARE with Armando Ramirez MD  
[facility] [address]) Appt Note: 6 mo  
 [address] [facility] [address]  
[phone]  
  
   
 [address] [place] [id number] Upcoming Health Maintenance Date Due  
 FOOT EXAM Q1 5/31/1979 MICROALBUMIN Q1 10/13/2016 EYE EXAM RETINAL OR DILATED Q1 2/8/2017 LIPID PANEL Q1 6/27/2017 HEMOGLOBIN A1C Q6M 8/13/2018 Influenza Age 5 to Adult 8/1/2018 DTaP/Tdap/Td series (2 - Td) 11/12/2023 Allergies as of 7/25/2018  Review Complete On: 7/25/2018 By: Layo Santos LPN Severity Noted Reaction Type Reactions Sulfa (Sulfonamide Antibiotics) High 03/04/2011   Side Effect Anaphylaxis, Other (comments) Difficulty breathing/high fever/delusional    
 Aleve [Naproxen Sodium]  09/30/2013    Nausea Only Codeine  03/04/2011   Side Effect Nausea Only Ibuprofen  03/04/2011   Side Effect Hives Lactose  02/23/2016   Intolerance Other (comments) Gas Penicillin G  03/04/2011   Side Effect Hives Has had cephalexin without issue. Current Immunizations  Reviewed on 7/11/2016 Name Date Influenza Vaccine 11/12/2013 Influenza Vaccine (Quad) PF 11/6/2017, 10/13/2015 Influenza Vaccine (Whole Virus) 3/1/2013 Influenza Vaccine PF 10/23/2014 Influenza Vaccine Split 11/2/2011 Pneumococcal Polysaccharide (PPSV-23) 3/11/2015 Tdap 11/12/2013 Not reviewed this visit You Were Diagnosed With   
  
 Codes Comments [facility]    -  Primary ICD-10-CM: E66.01 
ICD-9-CM: 278.01 S/P gastric bypass     ICD-10-CM: H06.36 ICD-9-CM: V45.86 Intestinal malabsorption, unspecified type     ICD-10-CM: K90.9 ICD-9-CM: 579.9 Vitamin B12 deficiency     ICD-10-CM: E53.8 ICD-9-CM: 266.2 Vitamin D deficiency     ICD-10-CM: E55.9 ICD-9-CM: 268.9 Iron deficiency anemia, unspecified iron deficiency anemia type     ICD-10-CM: D50.9 ICD-9-CM: 280.9 BMI 29.0-29.9,adult     ICD-10-CM: L82.00 ICD-9-CM: V85.25 Vitals BP Pulse Temp Resp Height(growth percentile) Weight(growth percentile) 130/80 (!) 58 98.1 °F (36.7 °C) 20 5' 3\" (1.6 m) 168 lb 8 oz (76.4 kg) LMP SpO2 BMI OB Status Smoking Status 01/29/2011 97% 29.85 kg/m2 Hysterectomy Former Smoker Vitals History BMI and BSA Data Body Mass Index Body Surface Area  
 29.85 kg/m 2 1.84 m 2 Preferred Pharmacy Pharmacy Name Phone [id number] [place], [address] [phone] Your Updated Medication List  
  
   
This list is accurate as of 7/25/18  9:29 AM.  Always use your most recent med list.  
  
  
  
  
 ALPRAZolam 0.5 mg tablet Commonly known as:  Pixie Oz Take 1 Tab by mouth three (3) times daily as needed for Anxiety. Max Daily Amount: 1.5 mg.  
  
 ascorbic acid (vitamin C) 250 mg tablet Commonly known as:  VITAMIN C Take 500 mg by mouth two (2) times a day. B COMPLEX 1 tablet Generic drug:  b complex vitamins Take 1 Tab by mouth daily. Benefiber Clear 3 gram/3.5 gram Powd Generic drug:  wheat dextrin Take  by mouth daily. 2 Teaspoons daily AT 7AM AND 5PM  
  
 buPROPion  mg SR tablet Commonly known as:  WELLBUTRIN SR  
TAKE 1 TABLET BY MOUTH EVERY DAY  
  
 busPIRone 5 mg tablet Commonly known as:  BUSPAR Take 5 mg by mouth as needed. butalbital-acetaminophen-caffeine -40 mg per tablet Commonly known as:  Markel Player Take 1 Tab by mouth every six (6) hours as needed for Pain. calcium citrate-vitamin d3 315-200 mg-unit Tab Commonly known as:  CITRACAL+D Take 2 Tabs by mouth two (2) times daily (with meals). Indications: Pt states, \"medication has been change to take at noon and dinner. \"  
  
 cetirizine 10 mg tablet Commonly known as:  ZYRTEC Take  by mouth nightly. cinnamon bark 500 mg Cap Take 500 mg by mouth. COQ10 (UBIQUINOL) PO Take  by mouth. CULTURELLE PO Take 1 Tab by mouth daily. cyanocobalamin 1,000 mcg tablet Take 5,000 mcg by mouth daily. cyclobenzaprine 10 mg tablet Commonly known as:  FLEXERIL  
TAKE 1 TABLET BY MOUTH 3 TIMES A DAY AS NEEDED MUSCLE SPASM  
  
 echinacea purpurea root 80 mg Cap Take  by mouth.  
  
 escitalopram oxalate 10 mg tablet Commonly known as:  Brandon Setters TAKE 1 TAB BY MOUTH DAILY. famotidine 40 mg tablet Commonly known as:  PEPCID  
TAKE 1 TAB BY MOUTH TWO (2) TIMES A DAY. INDICATIONS: GASTROESOPHAGEAL REFLUX DISEASE  Indications: gastroesophageal reflux disease FLINTSTONES COMPLETE Chew Generic drug:  pediatric multivitamin no.76 Take 1 Tab by mouth two (2) times a day. FLONASE 50 mcg/actuation nasal spray Generic drug:  fluticasone 2 Sprays by Both Nostrils route nightly as needed for Rhinitis (as needed). * gabapentin 300 mg capsule Commonly known as:  NEURONTIN  
TAKE ONE CAPSULE BY MOUTH IN THE MORNING, 1 CAPSULE AT NOON, AND 2 CAPSULES AT BEDTIME  
  
 * gabapentin 300 mg capsule Commonly known as:  NEURONTIN  
TAKE 2 CAPSULES BY MOUTH THREE (3) TIMES DAILY. hyoscyamine SL 0.125 mg SL tablet Commonly known as:  LEVSIN/SL TAKE 1 TAB BY SUBLINGUAL ROUTE EVERY FOUR (4) HOURS AS NEEDED FOR CRAMPING.  
  
 ketoconazole 2 % topical cream  
Commonly known as:  NIZORAL  
 Apply  to affected area daily. magnesium 250 mg Tab Take 500 mg by mouth daily. MIRALAX 17 gram packet Generic drug:  polyethylene glycol Take 17 g by mouth daily. * ondansetron 4 mg disintegrating tablet Commonly known as:  ZOFRAN ODT  
TAKE 1 TABLET BY MOUTH EVERY 8 HOURS AS NEEDED FOR NAUSEA  
  
 * ondansetron 4 mg disintegrating tablet Commonly known as:  ZOFRAN ODT  
TAKE 1 TAB BY MOUTH EVERY SIX (6) HOURS AS NEEDED FOR NAUSEA.  
  
 rizatriptan 10 mg disintegrating tablet Commonly known as:  MAXALT-MLT Take 1 Tab by mouth once as needed for Migraine for up to 1 dose. SYNTHROID 175 mcg tablet Generic drug:  levothyroxine TAKE 1 TABLET BY MOUTH EVERY DAY  
  
 SYSTANE (PROPYLENE GLYCOL) 0.4-0.3 % Drop Generic drug:  peg 400-propylene glycol Administer 1 Drop to both eyes as needed. traMADol 50 mg tablet Commonly known as:  ULTRAM  
Take 1 Tab by mouth every six (6) hours as needed for Pain. Max Daily Amount: 200 mg. VITAMIN D3 5,000 unit Tab tablet Generic drug:  cholecalciferol (VITAMIN D3) Take 5,000 Units by mouth two (2) times a day. LIQUID SOFT GEL  
  
 vitamin e 1,000 unit capsule Commonly known as:  E GEMS Take 1,000 Units by mouth daily. * Notice: This list has 4 medication(s) that are the same as other medications prescribed for you. Read the directions carefully, and ask your doctor or other care provider to review them with you. We Performed the Following CBC W/O DIFF [21779 CPT(R)] IRON PROFILE [id number] CPT(R)] METABOLIC PANEL, COMPREHENSIVE [64597 CPT(R)] PTH INTACT [92662 CPT(R)] VITAMIN B12 & FOLATE [53366 CPT(R)] VITAMIN D, 25 HYDROXY [id number] CPT(R)] Follow-up Instructions Return in about 6 months (around 1/25/2019). Patient Instructions Decrease snacking. Incorporate more plain water, at least 20 ounces daily. Learning About Changing a Habit by Setting Goals How can you change a habit? If you've decided to change a habit-whether it's quitting smoking, lowering your blood pressure, becoming more active, or doing something else to improve your health-congratulations! Making that decision is the first step toward making a change. What happens next? Have a reason. Set goals you can reach. Prepare for slip-ups. And get support. What's your reason? Your reason for wanting to change a habit is really important. Maybe you want to quit smoking so that you can avoid future health problems. Or maybe you want to eat a healthier diet so you can lose weight. If you have high blood pressure, your reason may be clear: to lower your blood pressure. Maybe you smoke and want to save money on cigarettes. You need to feel ready to make a change. If you don't feel ready now, that's okay. You can still be thinking and planning. When you truly want to make changes, you're ready for the next step. It's not easy to change habits-but you can do it. Taking the time to really think about what will motivate or inspire you will help you reach your goals. How do you set goals? · Focus on small goals. This will help you reach larger goals over time. With smaller goals, you'll have success more often, which will help you stay with it. For example, your large goal may be to lose 50 pounds. Your small goal could be to lose 5. 
· Write down your goals. This will help you remember, and you'll have a clearer idea of what you want to achieve. Use a journal or notebook to record your goals. Hang up your plan where you will see it often as a reminder of what you're trying to do. · Make your goals specific. Specific goals help you measure your progress. For example, setting a goal to eat 5 helpings of fruits and vegetables 5 days a week is better than a general goal to \"eat more vegetables. \" 
· Focus on one goal at a time. By doing this, you're less likely to feel overwhelmed and then give up. · When you reach a goal, reward yourself. Celebrate your new behavior and success for several days, and then think about setting your next goal. 
How can you prepare for slip-ups? It's perfectly normal to try to change a habit, go along fine for a while, and then have a setback. Lots of people try and try again before they reach their goals. What are the things that might cause a setback for you? If you have tried to change a habit before, think about what helped you and what got in your way. By thinking about these barriers now, you can plan ahead for how to deal with them if they happen. There will be times when you slip up and don't make your goal for the week. When that happens, don't get mad at yourself. Learn from the experience. Ask yourself what got in the way of reaching your goal. Positive thinking goes a long way when you're making lifestyle changes. How can you get support? · Get a partner. It's motivating to know that someone is trying to make the same change that you're making, like being more active or changing your eating habits. You have someone who is counting on you to help him or her succeed. That person can also remind you how far you've come. · Get friends and family involved. They can exercise with you or encourage you by saying how they admire what you are doing. Family members can join you in your healthy eating efforts. Don't be afraid to tell family and friends that their encouragement makes a big difference to you. · Join a class or support group. People in these groups often have some of the same barriers you have. They can give you support when you don't feel like staying with your plan. They can boost your morale when you need a lift. Rosemary Councilman also find a number of online support groups. · Encourage yourself. When you feel like giving up, don't waste energy feeling bad about yourself.  Remember your reason for wanting to change, think about the progress you've made, and give yourself a pep talk and a pat on the back. · Get professional help. A dietitian can help you make your diet healthier while still allowing you to eat foods that you enjoy. A  or physical therapist can help design an exercise program that is fun and easy to stay on. A counselor, a , or your doctor can help you overcome hurdles, reduce stress, or quit smoking. Where can you learn more? Go to http://josee-gutierrez.info/. Enter N895 in the search box to learn more about \"Learning About Changing a Habit by Setting Goals. \" Current as of: December 7, 2017 Content Version: 11.7 © 1745-9361 [employer], [employer]. Care instructions adapted under license by [employer] (which disclaims liability or warranty for this information). If you have questions about a medical condition or this instruction, always ask your healthcare professional. [place] [address] any warranty or liability for your use of this information. Introducing [facility]! Dear Sivan Ortiz: 
Thank you for requesting a Fix[employer] account. Our records indicate that you already have an active Fixber account. You can access your account anytime at [email or web address]/"[email or web address]" Did you know that you can access your hospital and ER discharge instructions at any time in Fixber? You can also review all of your test results from your hospital stay or ER visit. Additional Information If you have questions, please visit the Frequently Asked Questions section of the Fixber website at [email or web address] Remember, Fixber is NOT to be used for urgent needs. For medical emergencies, dial 911. Now available from your iPhone and Android! Please provide this summary of care documentation to your next provider. Your primary care clinician is listed as Ana Manzo.  If you have any questions after today's visit, please call [phone].

## 2018-07-25 NOTE — PROGRESS NOTES
Nam Almonte is a 51 y.o. female 2 years status post lap gastric bypass, down 83 pounds. Weight today is 168.5 pounds. Patient has gained 5 pounds in the last 6 months. Stated she is upset about gain because it related to inactivity and snacking more. Stated she has surgery on right arm, still wearing cast, and has limited activity. Stated she is finding she is snacking more sure to just sitting at home. Stated she feels depressed about it. Denies nausea, no vomiting, no heartburn/reflux. Denies dysphagia. No fever/no chills, no shortness of breath, no chest pain, and no abdominal pain. Tolerating all foods except breads and greasy foods, getting 60+ protein daily. Protein supplementation in use daily with protein shake or bar. Snacking with fruit clusters with granola. Drinking mostly tea with almond milk and artificial sweetener. Tolerating all vitamins and medications. Exercising minimal. Attending physical therapy. No issues with urination. Bowel movements once daily that are formed. HPI    Review of Systems   Constitutional: Negative for chills, fever and malaise/fatigue. Respiratory: Negative for cough, sputum production and shortness of breath. Cardiovascular: Negative for chest pain, palpitations and leg swelling. Gastrointestinal: Negative for abdominal pain, blood in stool, constipation, diarrhea, heartburn, nausea and vomiting. Genitourinary: Negative for dysuria. Musculoskeletal: Positive for joint pain. Neurological: Negative for dizziness. Psychiatric/Behavioral: Positive for depression. Negative for hallucinations, memory loss, substance abuse and suicidal ideas. The patient is not nervous/anxious and does not have insomnia. Physical Exam   Constitutional: She is oriented to person, place, and time. She appears well-developed and well-nourished. No distress. Cardiovascular: Normal rate, regular rhythm and normal heart sounds.     Pulmonary/Chest: Effort normal and breath sounds normal. No respiratory distress. She has no wheezes. She has no rales. Abdominal: Soft. Bowel sounds are normal. She exhibits no distension. There is no tenderness. There is no rebound and no guarding. Lap sites healed. No hernia/masses palpated    Musculoskeletal:   Right arm in cast   Neurological: She is alert and oriented to person, place, and time. Skin: Skin is warm and dry. No rash noted. No erythema. Psychiatric: She has a normal mood and affect. Her behavior is normal.   Blood pressure 130/80, pulse (!) 58, temperature 98.1 °F (36.7 °C), resp. rate 20, height 5' 3\" (1.6 m), weight 168 lb 8 oz (76.4 kg), last menstrual period 01/29/2011, SpO2 97 %. ASSESSMENT and PLAN  Morbid Obesity 2 years status post lap gastric bypass, down 83 pounds. Weight today is 168.5 pounds. Advised patient regard to diet that is high-protein, low-fat, low-sugar, limited carbohydrates. Strive for 60 grams of protein daily. Advised patient to decrease snacking/ calorie intake with limited at activity at this time. Discussed snacking choices. If having a snack, foods that are protein or fiber rich. Still pay attention to behavioral factor and habits. No eating/drinking together, chew foods well, and portion control. Advised patient to also substitute more water for tea to decrease calorie intake. Drink at least 40 ounces of non-carbonated, non-calorie beverages daily. Continue vitamin regiment daily. Exercise at least 3 days a week with cardiovascular and strength training as tolerated. Recommended walking for now. Provided patient with routine lab work slip. Advised anything concerning with labs, will contact patient prior to next visit. Patient to follow up in 6 months. Patient verbalized understanding and questions were answered to the best of my knowledge and ability. Goal setting educational materials were provided. Advised to call office if any questions/concerns.   18 minutes was spent with patient, greater than 50% of time spent counseling.

## 2018-07-25 NOTE — PROGRESS NOTES
1. Have you been to the ER, urgent care clinic since your last visit? Hospitalized since your last visit? No    2. Have you seen or consulted any other health care providers outside of the [address] [place] [address] since your last visit? Include any pap smears or colon screening.  No

## 2018-07-25 NOTE — PATIENT INSTRUCTIONS
Decrease snacking. Incorporate more plain water, at least 20 ounces daily. Learning About Changing a Habit by Setting Goals  How can you change a habit? If you've decided to change a habit-whether it's quitting smoking, lowering your blood pressure, becoming more active, or doing something else to improve your health-congratulations! Making that decision is the first step toward making a change. What happens next? Have a reason. Set goals you can reach. Prepare for slip-ups. And get support. What's your reason? Your reason for wanting to change a habit is really important. Maybe you want to quit smoking so that you can avoid future health problems. Or maybe you want to eat a healthier diet so you can lose weight. If you have high blood pressure, your reason may be clear: to lower your blood pressure. Maybe you smoke and want to save money on cigarettes. You need to feel ready to make a change. If you don't feel ready now, that's okay. You can still be thinking and planning. When you truly want to make changes, you're ready for the next step. It's not easy to change habits-but you can do it. Taking the time to really think about what will motivate or inspire you will help you reach your goals. How do you set goals? · Focus on small goals. This will help you reach larger goals over time. With smaller goals, you'll have success more often, which will help you stay with it. For example, your large goal may be to lose 50 pounds. Your small goal could be to lose 5.  · Write down your goals. This will help you remember, and you'll have a clearer idea of what you want to achieve. Use a journal or notebook to record your goals. Hang up your plan where you will see it often as a reminder of what you're trying to do. · Make your goals specific. Specific goals help you measure your progress.  For example, setting a goal to eat 5 helpings of fruits and vegetables 5 days a week is better than a general goal to \"eat more vegetables. \"  · Focus on one goal at a time. By doing this, you're less likely to feel overwhelmed and then give up. · When you reach a goal, reward yourself. Celebrate your new behavior and success for several days, and then think about setting your next goal.  How can you prepare for slip-ups? It's perfectly normal to try to change a habit, go along fine for a while, and then have a setback. Lots of people try and try again before they reach their goals. What are the things that might cause a setback for you? If you have tried to change a habit before, think about what helped you and what got in your way. By thinking about these barriers now, you can plan ahead for how to deal with them if they happen. There will be times when you slip up and don't make your goal for the week. When that happens, don't get mad at yourself. Learn from the experience. Ask yourself what got in the way of reaching your goal. Positive thinking goes a long way when you're making lifestyle changes. How can you get support? · Get a partner. It's motivating to know that someone is trying to make the same change that you're making, like being more active or changing your eating habits. You have someone who is counting on you to help him or her succeed. That person can also remind you how far you've come. · Get friends and family involved. They can exercise with you or encourage you by saying how they admire what you are doing. Family members can join you in your healthy eating efforts. Don't be afraid to tell family and friends that their encouragement makes a big difference to you. · Join a class or support group. People in these groups often have some of the same barriers you have. They can give you support when you don't feel like staying with your plan. They can boost your morale when you need a lift. Dana Vazquezs also find a number of online support groups. · Encourage yourself.  When you feel like giving up, don't waste energy feeling bad about yourself. Remember your reason for wanting to change, think about the progress you've made, and give yourself a pep talk and a pat on the back. · Get professional help. A dietitian can help you make your diet healthier while still allowing you to eat foods that you enjoy. A  or physical therapist can help design an exercise program that is fun and easy to stay on. A counselor, a , or your doctor can help you overcome hurdles, reduce stress, or quit smoking. Where can you learn more? Go to http://josee-gutierrez.info/. Enter Y542 in the search box to learn more about \"Learning About Changing a Habit by Setting Goals. \"  Current as of: December 7, 2017  Content Version: 11.7  © 2051-5625 [employer]. Care instructions adapted under license by [employer] (which disclaims liability or warranty for this information). If you have questions about a medical condition or this instruction, always ask your healthcare professional. [address] any warranty or liability for your use of this information.

## 2018-07-26 LAB
25(OH)D3+25(OH)D2 SERPL-MCNC: 66.2 NG/ML (ref 30–100)
ALBUMIN SERPL-MCNC: 4.3 G/DL (ref 3.5–5.5)
ALBUMIN/GLOB SERPL: 1.8 {RATIO} (ref 1.2–2.2)
ALP SERPL-CCNC: 94 IU/L (ref 39–117)
ALT SERPL-CCNC: 33 IU/L (ref 0–32)
AST SERPL-CCNC: 27 IU/L (ref 0–40)
BILIRUB SERPL-MCNC: 0.3 MG/DL (ref 0–1.2)
BUN SERPL-MCNC: 12 MG/DL (ref 6–24)
BUN/CREAT SERPL: 20 (ref 9–23)
CALCIUM SERPL-MCNC: 9.3 MG/DL (ref 8.7–10.2)
CHLORIDE SERPL-SCNC: 100 MMOL/L (ref 96–106)
CO2 SERPL-SCNC: 25 MMOL/L (ref 20–29)
CREAT SERPL-MCNC: 0.6 MG/DL (ref 0.57–1)
ERYTHROCYTE [DISTWIDTH] IN BLOOD BY AUTOMATED COUNT: 12.8 % (ref 12.3–15.4)
FOLATE SERPL-MCNC: >20 NG/ML
GLOBULIN SER CALC-MCNC: 2.4 G/DL (ref 1.5–4.5)
GLUCOSE SERPL-MCNC: 92 MG/DL (ref 65–99)
HCT VFR BLD AUTO: 43.3 % (ref 34–46.6)
HGB BLD-MCNC: 14.3 G/DL (ref 11.1–15.9)
IRON SATN MFR SERPL: 46 % (ref 15–55)
IRON SERPL-MCNC: 137 UG/DL (ref 27–159)
MCH RBC QN AUTO: 30.7 PG (ref 26.6–33)
MCHC RBC AUTO-ENTMCNC: 33 G/DL (ref 31.5–35.7)
MCV RBC AUTO: 93 FL (ref 79–97)
PLATELET # BLD AUTO: 243 X10E3/UL (ref 150–379)
POTASSIUM SERPL-SCNC: 4.6 MMOL/L (ref 3.5–5.2)
PROT SERPL-MCNC: 6.7 G/DL (ref 6–8.5)
PTH-INTACT SERPL-MCNC: 37 PG/ML (ref 15–65)
RBC # BLD AUTO: 4.66 X10E6/UL (ref 3.77–5.28)
SODIUM SERPL-SCNC: 140 MMOL/L (ref 134–144)
TIBC SERPL-MCNC: 298 UG/DL (ref 250–450)
UIBC SERPL-MCNC: 161 UG/DL (ref 131–425)
VIT B12 SERPL-MCNC: 781 PG/ML (ref 232–1245)
WBC # BLD AUTO: 4.9 X10E3/UL (ref 3.4–10.8)

## 2018-07-26 NOTE — PROGRESS NOTES
Labs look good and in appropriate range. Continue current bariatric vitamin regime. Follow up in 6 months.

## 2018-09-10 DIAGNOSIS — M79.7 FIBROMYALGIA: ICD-10-CM

## 2018-09-10 DIAGNOSIS — M79.18 MYOFASCIAL MUSCLE PAIN: ICD-10-CM

## 2018-09-10 DIAGNOSIS — G43.909 MIGRAINE WITHOUT STATUS MIGRAINOSUS, NOT INTRACTABLE, UNSPECIFIED MIGRAINE TYPE: ICD-10-CM

## 2018-09-10 RX ORDER — TRAMADOL HYDROCHLORIDE 50 MG/1
TABLET ORAL
Qty: 30 TAB | Refills: 0 | Status: SHIPPED | OUTPATIENT
Start: 2018-09-10 | End: 2018-11-07 | Stop reason: SDUPTHER

## 2018-09-24 DIAGNOSIS — G43.909 MIGRAINE WITHOUT STATUS MIGRAINOSUS, NOT INTRACTABLE, UNSPECIFIED MIGRAINE TYPE: ICD-10-CM

## 2018-09-25 RX ORDER — BUTALBITAL, ACETAMINOPHEN AND CAFFEINE 50; 325; 40 MG/1; MG/1; MG/1
TABLET ORAL
Qty: 40 TAB | Refills: 1 | Status: SHIPPED | OUTPATIENT
Start: 2018-09-25 | End: 2018-12-06 | Stop reason: SDUPTHER

## 2018-10-04 DIAGNOSIS — F41.8 SITUATIONAL ANXIETY: ICD-10-CM

## 2018-10-05 RX ORDER — ALPRAZOLAM 0.5 MG/1
0.5 TABLET ORAL
Qty: 20 TAB | Refills: 0 | OUTPATIENT
Start: 2018-10-05 | End: 2018-11-07 | Stop reason: SDUPTHER

## 2018-10-05 NOTE — TELEPHONE ENCOUNTER
From: Koby Lincoln  To: Bhumika Peralta MD  Sent: 10/4/2018 4:58 PM EDT  Subject: Medication Renewal Request    Original authorizing provider: MD Estrella Spring.  Kizzy Inman would like a refill of the following medications:  ALPRAZolam (XANAX) 0.5 mg tablet Bhumika Peralta MD]    Preferred pharmacy: [id number] - [place], [address]    Comment:

## 2018-10-21 DIAGNOSIS — F41.9 ANXIETY: ICD-10-CM

## 2018-10-22 RX ORDER — ESCITALOPRAM OXALATE 10 MG/1
TABLET ORAL
Qty: 30 TAB | Refills: 5 | Status: SHIPPED | OUTPATIENT
Start: 2018-10-22 | End: 2018-11-07 | Stop reason: DRUGHIGH

## 2018-11-07 ENCOUNTER — OFFICE VISIT (OUTPATIENT)
Dept: INTERNAL MEDICINE CLINIC | Age: 48
End: 2018-11-07

## 2018-11-07 VITALS
HEIGHT: 63 IN | OXYGEN SATURATION: 98 % | HEART RATE: 61 BPM | SYSTOLIC BLOOD PRESSURE: 143 MMHG | WEIGHT: 164.2 LBS | RESPIRATION RATE: 18 BRPM | BODY MASS INDEX: 29.09 KG/M2 | DIASTOLIC BLOOD PRESSURE: 89 MMHG | TEMPERATURE: 98.2 F

## 2018-11-07 DIAGNOSIS — E03.4 HYPOTHYROIDISM DUE TO ACQUIRED ATROPHY OF THYROID: ICD-10-CM

## 2018-11-07 DIAGNOSIS — M79.18 MYOFASCIAL MUSCLE PAIN: ICD-10-CM

## 2018-11-07 DIAGNOSIS — M79.7 FIBROMYALGIA: ICD-10-CM

## 2018-11-07 DIAGNOSIS — F41.9 ANXIETY: Primary | ICD-10-CM

## 2018-11-07 DIAGNOSIS — G43.909 MIGRAINE WITHOUT STATUS MIGRAINOSUS, NOT INTRACTABLE, UNSPECIFIED MIGRAINE TYPE: ICD-10-CM

## 2018-11-07 RX ORDER — ALPRAZOLAM 0.5 MG/1
0.5 TABLET ORAL
Qty: 20 TAB | Refills: 1 | Status: SHIPPED | OUTPATIENT
Start: 2018-11-07 | End: 2019-04-20 | Stop reason: SDUPTHER

## 2018-11-07 RX ORDER — TRAMADOL HYDROCHLORIDE 50 MG/1
TABLET ORAL
Qty: 30 TAB | Refills: 0 | Status: SHIPPED | OUTPATIENT
Start: 2018-11-07 | End: 2018-12-21 | Stop reason: SDUPTHER

## 2018-11-07 RX ORDER — ESCITALOPRAM OXALATE 20 MG/1
20 TABLET ORAL DAILY
Qty: 30 TAB | Refills: 1 | Status: SHIPPED | OUTPATIENT
Start: 2018-11-07 | End: 2018-12-18 | Stop reason: SDUPTHER

## 2018-11-07 NOTE — PROGRESS NOTES
Chief Complaint   Patient presents with    Follow-up     6 mo, anxiety/depression    Migraine     increasing in frequency, had 3 last week one lasting 24 hrs. Health Maintenance Due   Topic Date Due    FOOT EXAM Q1  05/31/1979    MICROALBUMIN Q1  10/13/2016    EYE EXAM RETINAL OR DILATED Q1  02/08/2017    LIPID PANEL Q1  06/27/2017    Influenza Age 5 to Adult  08/01/2018    HEMOGLOBIN A1C Q6M  08/13/2018   flu shot 10/20/18  Coordination of Care Questions    1. Have you been to the ER, urgent care clinic since your last visit? No       Hospitalized since your last visit? No    2. Have you seen or consulted any other health care providers outside of the [address] [place] [address] since your last visit? Include any pap smears or colon screening.  No

## 2018-11-07 NOTE — PROGRESS NOTES
HISTORY OF PRESENT ILLNESS  Micky Partida is a 51 y.o. female. HPI  Thyroid Disease:  Micky Partida is a 51 y.o. female here for follow up of hypothyroidism. Lab Results   Component Value Date/Time    TSH 0.990 11/06/2017 08:44 AM     Residual symptoms denies fatigue, weight changes, heat/cold intolerance, bowel/skin changes or CVS symptoms. She is having more anxiety due to family stressors. she denies denies fatigue, weight changes, heat/cold intolerance, bowel/skin changes or CVS symptoms  Thyroid medication has been unchanged since last medication check and labs. She reports more anxiety due to family stressors. No depression. She is requesting more lexapro. She is using xanax 3-4 times/ week. Patient Active Problem List   Diagnosis Code    Anxiety F41.9    Depression F32.9    Fibromyalgia M79.7    Hypothyroidism due to acquired atrophy of thyroid E03.4    Status following gastric bypass for weight loss Z98.84     Past Medical History:   Diagnosis Date    Arthritis     Autoimmune disease ([facility]     FIBROMYALGIA    BMI 45.0-49.9, adult ([facility]     Chronic pain     fibromyalgia    Depression     and anxiety    Diabetes ([facility]     HX NIDDM (NO MEDS 4-12-17)    Fibromyalgia     GERD (gastroesophageal reflux disease) 2013    H/O seasonal allergies     Headache(784.0)     Hypertension     HX HTN, NO MEDS(4-12-17)    Migraines     Morbid obesity ([facility]     HX: GASTRIC BYPASS    LINO on CPAP     NOT CURRENTLY USING CPAP (SINCE 5/2017)    Thyroid disease     HYPOTHYROID     Allergies   Allergen Reactions    Sulfa (Sulfonamide Antibiotics) Anaphylaxis and Other (comments)     Difficulty breathing/high fever/delusional      Aleve [Naproxen Sodium] Nausea Only    Codeine Nausea Only    Ibuprofen Hives    Lactose Other (comments)     Gas     Penicillin G Hives     Has had cephalexin without issue.      Current Outpatient Medications on File Prior to Visit   Medication Sig Dispense Refill  escitalopram oxalate (LEXAPRO) 10 mg tablet TAKE 1 TABLET BY MOUTH EVERY DAY 30 Tab 5    ALPRAZolam (XANAX) 0.5 mg tablet Take 1 Tab by mouth three (3) times daily as needed for Anxiety. Max Daily Amount: 1.5 mg. 20 Tab 0    butalbital-acetaminophen-caffeine (FIORICET, ESGIC) -40 mg per tablet TAKE 1 TABLET BY MOUTH EVERY 6 HOURS AS NEEDED FOR PAIN 40 Tab 1    buPROPion SR (WELLBUTRIN SR) 100 mg SR tablet TAKE 1 TABLET BY MOUTH EVERY DAY 30 Tab 5    traMADol (ULTRAM) 50 mg tablet TAKE 1 TABLET EVERY 6 HOURS AS NEEDED FOR PAIN 30 Tab 0    hyoscyamine SL (LEVSIN/SL) 0.125 mg SL tablet TAKE 1 TAB BY SUBLINGUAL ROUTE EVERY FOUR (4) HOURS AS NEEDED FOR CRAMPING. 30 Tab 1    ondansetron (ZOFRAN ODT) 4 mg disintegrating tablet TAKE 1 TAB BY MOUTH EVERY SIX (6) HOURS AS NEEDED FOR NAUSEA. 30 Tab 1    vitamin e (E GEMS) 1,000 unit capsule Take 1,000 Units by mouth daily.  b complex vitamins (B COMPLEX 1) tablet Take 1 Tab by mouth daily.  COQ10, UBIQUINOL, PO Take  by mouth.  cinnamon bark 500 mg cap Take 500 mg by mouth.  echinacea purpurea root 80 mg cap Take  by mouth.  cyclobenzaprine (FLEXERIL) 10 mg tablet TAKE 1 TABLET BY MOUTH 3 TIMES A DAY AS NEEDED MUSCLE SPASM 90 Tab 5    gabapentin (NEURONTIN) 300 mg capsule TAKE ONE CAPSULE BY MOUTH IN THE MORNING, 1 CAPSULE AT NOON, AND 2 CAPSULES AT BEDTIME (Patient taking differently: TAKE ONE CAPSULE BY MOUTH IN THE MORNING, 1 CAPSULE AT NOON, 2 capsules as dinner, AND 2 CAPSULES AT BEDTIME) 120 Cap 5    rizatriptan (MAXALT-MLT) 10 mg disintegrating tablet Take 1 Tab by mouth once as needed for Migraine for up to 1 dose. 10 Tab 2    famotidine (PEPCID) 40 mg tablet TAKE 1 TAB BY MOUTH TWO (2) TIMES A DAY.  INDICATIONS: GASTROESOPHAGEAL REFLUX DISEASE  Indications: gastroesophageal reflux disease 180 Tab 3    SYNTHROID 175 mcg tablet TAKE 1 TABLET BY MOUTH EVERY DAY 30 Tab 5    cyanocobalamin 1,000 mcg tablet Take 5,000 mcg by mouth daily.  pediatric multivitamin no.76 (FLINTSTONES COMPLETE) chew Take 1 Tab by mouth two (2) times a day.  polyethylene glycol (MIRALAX) 17 gram packet Take 17 g by mouth daily.  ascorbic acid, vitamin C, (VITAMIN C) 250 mg tablet Take 500 mg by mouth two (2) times a day.  LACTOBACILLUS RHAMNOSUS GG (CULTURELLE PO) Take 1 Tab by mouth daily.  magnesium 250 mg tab Take 500 mg by mouth daily.  calcium citrate-vitamin d3 (CITRACAL+D) 315-200 mg-unit tab Take 2 Tabs by mouth two (2) times daily (with meals). Indications: Pt states, \"medication has been change to take at noon and dinner. \"      cetirizine (ZYRTEC) 10 mg tablet Take  by mouth nightly.  wheat dextrin (BENEFIBER CLEAR) 3 gram/3.5 gram powd Take  by mouth daily. 2 Teaspoons daily AT 7AM AND 5PM      cholecalciferol, VITAMIN D3, (VITAMIN D3) 5,000 unit tab tablet Take 5,000 Units by mouth two (2) times a day. LIQUID SOFT GEL      fluticasone (FLONASE) 50 mcg/actuation nasal spray 2 Sprays by Both Nostrils route nightly as needed for Rhinitis (as needed).  gabapentin (NEURONTIN) 300 mg capsule TAKE 2 CAPSULES BY MOUTH THREE (3) TIMES DAILY. (Patient not taking: Reported on 2018) 180 Cap 4    ondansetron (ZOFRAN ODT) 4 mg disintegrating tablet TAKE 1 TABLET BY MOUTH EVERY 8 HOURS AS NEEDED FOR NAUSEA 30 Tab 0    busPIRone (BUSPAR) 5 mg tablet Take 5 mg by mouth as needed. 0    ketoconazole (NIZORAL) 2 % topical cream Apply  to affected area daily. (Patient not taking: Reported on 2018) 30 g 0    peg 400-propylene glycol (SYSTANE) 0.4-0.3 % drop Administer 1 Drop to both eyes as needed. No current facility-administered medications on file prior to visit.       Social History     Tobacco Use    Smoking status: Former Smoker     Packs/day: 1.00     Years: 20.00     Pack years: 20.00     Types: Cigarettes     Last attempt to quit: 3/4/2005     Years since quittin.6    Smokeless tobacco: Never Used   Substance Use Topics    Alcohol use: No     Alcohol/week: 0.0 oz    Drug use: Yes     Comment: distant HISTORY OF AMPHETAMINES         ROS    Physical Exam   Constitutional: She appears well-developed and well-nourished. No distress. /89 (BP 1 Location: Right arm, BP Patient Position: Sitting)   Pulse 61   Temp 98.2 °F (36.8 °C) (Oral)   Resp 18   Ht 5' 3\" (1.6 m)   Wt 164 lb 3.2 oz (74.5 kg)   LMP 01/29/2011   SpO2 98%   BMI 29.09 kg/m² Body mass index is 29.09 kg/m². HENT:   Mouth/Throat: Oropharynx is clear and moist.   Neck: No JVD present. Carotid bruit is not present. Cardiovascular: Normal rate, regular rhythm, normal heart sounds and intact distal pulses. Pulmonary/Chest: Effort normal and breath sounds normal.   Musculoskeletal: She exhibits no edema. Neurological: She is alert. Skin: Skin is warm and dry. She is not diaphoretic. Psychiatric: She has a normal mood and affect. Her speech is normal and behavior is normal. Judgment and thought content normal. Cognition and memory are normal.   Nursing note and vitals reviewed. ASSESSMENT and PLAN  Diagnoses and all orders for this visit:    1. Anxiety - uncontrolled due to increased stressors. Will increase SSRI now. Xanax prn. Recheck in 4 weeks. -     escitalopram oxalate (LEXAPRO) 20 mg tablet; Take 1 Tab by mouth daily.  -     ALPRAZolam (XANAX) 0.5 mg tablet; Take 1 Tab by mouth three (3) times daily as needed for Anxiety. Max Daily Amount: 1.5 mg.    2. Hypothyroidism due to acquired atrophy of thyroid -recheck now  -     TSH 3RD GENERATION    3. Myofascial muscle pain -she needs refill. Using tramadol only occasionally  -     traMADol (ULTRAM) 50 mg tablet; TAKE 1 TABLET EVERY 6 HOURS AS NEEDED FOR PAIN    4. Fibromyalgia  -     traMADol (ULTRAM) 50 mg tablet; TAKE 1 TABLET EVERY 6 HOURS AS NEEDED FOR PAIN    5.  Migraine without status migrainosus, not intractable, unspecified migraine type  - traMADol (ULTRAM) 50 mg tablet; TAKE 1 TABLET EVERY 6 HOURS AS NEEDED FOR PAIN      Follow-up Disposition:  Return in about 4 weeks (around 12/5/2018) for physical.

## 2018-11-08 LAB — TSH SERPL DL<=0.005 MIU/L-ACNC: 0.12 UIU/ML (ref 0.45–4.5)

## 2018-11-26 ENCOUNTER — TELEPHONE (OUTPATIENT)
Dept: INTERNAL MEDICINE CLINIC | Age: 48
End: 2018-11-26

## 2018-11-26 RX ORDER — LEVOTHYROXINE SODIUM 150 MCG
150 TABLET ORAL
Qty: 30 TAB | Refills: 1 | Status: SHIPPED | OUTPATIENT
Start: 2018-11-26 | End: 2019-01-19 | Stop reason: SDUPTHER

## 2018-11-26 NOTE — TELEPHONE ENCOUNTER
Will reduce LT4 to 150mcg/ day. TSH in 2 months. New medication or Refill was escribed to patient's pharmacy as requested. Please let her know.

## 2018-11-26 NOTE — TELEPHONE ENCOUNTER
Regarding: FW:labs  Contact: [phone]  Lab result medication dose change request.   ----- Message -----  From: Koby Lincoln  Sent: 11/26/2018   9:28 AM  To: [facility]  Subject: RE:labs                                          ----- Message from [address] [place] [address] [address], Generic sent at 11/26/2018  9:28 AM EST -----    Dr. Kizzy Inman  I am requesting that we go ahead and adjust my thyroid medication dose now instead of waiting two months. After researching the symptoms of hyperthyroidism, I believe that much of what I have been experiencing is due to my levels being \"mildly high. \" These symptoms include: hand tremors - I believed them to be from blood sugar issues; irritability and anxiety - I associated with stress; racing heart - I associated this to the anxiety. For these reasons, I believe it would be best to adjust the  dosage now instead of waiting. Sincerely  Arlyss Romberg  ----- Message -----  From: Bhumika Peralta MD  Sent: 11/11/2018 12:43 PM EST  To: Koby Lincoln  Subject: labs  Dear Koby Lincoln,    I've reviewed your test results. Results are normal/stable unless otherwise noted. Thyroid : mildly high    RECOMMENDATIONS:  Recheck this test: thyroid  in  2 months. If still high then, will adjust your medication. Please let me know if you have questions or further concerns. Sincerely,    Lizz Gallagher.  Kizzy Inman MD

## 2018-12-06 DIAGNOSIS — G43.909 MIGRAINE WITHOUT STATUS MIGRAINOSUS, NOT INTRACTABLE, UNSPECIFIED MIGRAINE TYPE: ICD-10-CM

## 2018-12-07 RX ORDER — BUTALBITAL, ACETAMINOPHEN AND CAFFEINE 50; 325; 40 MG/1; MG/1; MG/1
TABLET ORAL
Qty: 40 TAB | Refills: 1 | Status: SHIPPED | OUTPATIENT
Start: 2018-12-07 | End: 2019-02-28 | Stop reason: SDUPTHER

## 2018-12-21 DIAGNOSIS — G43.909 MIGRAINE WITHOUT STATUS MIGRAINOSUS, NOT INTRACTABLE, UNSPECIFIED MIGRAINE TYPE: ICD-10-CM

## 2018-12-21 DIAGNOSIS — M79.7 FIBROMYALGIA: ICD-10-CM

## 2018-12-21 DIAGNOSIS — M79.18 MYOFASCIAL MUSCLE PAIN: ICD-10-CM

## 2018-12-21 RX ORDER — TRAMADOL HYDROCHLORIDE 50 MG/1
TABLET ORAL
Qty: 30 TAB | Refills: 0 | Status: SHIPPED | OUTPATIENT
Start: 2018-12-21 | End: 2019-02-12 | Stop reason: SDUPTHER

## 2018-12-21 NOTE — TELEPHONE ENCOUNTER
Verbal order per  [facility] for calling in medications   Requested Prescriptions     Signed Prescriptions Disp Refills    traMADol (ULTRAM) 50 mg tablet 30 Tab 0     Sig: TAKE 1 TABLET EVERY 6 HOURS AS NEEDED FOR PAIN     Authorizing Provider: Tatyana Cheek         Phone in.

## 2018-12-23 RX ORDER — GABAPENTIN 300 MG/1
CAPSULE ORAL
Qty: 180 CAP | Refills: 4 | Status: SHIPPED | OUTPATIENT
Start: 2018-12-23 | End: 2019-05-14 | Stop reason: SDUPTHER

## 2018-12-23 RX ORDER — LEVOTHYROXINE SODIUM 175 UG/1
TABLET ORAL
Qty: 30 TAB | Refills: 5 | Status: SHIPPED | OUTPATIENT
Start: 2018-12-23 | End: 2019-01-19

## 2019-01-19 RX ORDER — LEVOTHYROXINE SODIUM 150 MCG
TABLET ORAL
Qty: 30 TAB | Refills: 1 | Status: SHIPPED | OUTPATIENT
Start: 2019-01-19

## 2019-01-19 NOTE — TELEPHONE ENCOUNTER
Please call patient to see which dose of levothyroxine she is on. Seems that the wrong dose was refilled in December. Dose was dropped late last year to 150 mcg due to overtreatment. I have sent in 150 mcg.   I see that she has a follow-up with me in February

## 2019-01-22 RX ORDER — FAMOTIDINE 40 MG/1
TABLET, FILM COATED ORAL
Qty: 180 TAB | Refills: 2 | Status: SHIPPED | OUTPATIENT
Start: 2019-01-22 | End: 2019-10-13 | Stop reason: SDUPTHER

## 2019-02-04 ENCOUNTER — OFFICE VISIT (OUTPATIENT)
Dept: SURGERY | Age: 49
End: 2019-02-04

## 2019-02-04 VITALS
SYSTOLIC BLOOD PRESSURE: 120 MMHG | DIASTOLIC BLOOD PRESSURE: 80 MMHG | HEIGHT: 63 IN | OXYGEN SATURATION: 97 % | TEMPERATURE: 98.6 F | HEART RATE: 81 BPM | WEIGHT: 164.5 LBS | BODY MASS INDEX: 29.15 KG/M2 | RESPIRATION RATE: 20 BRPM

## 2019-02-04 DIAGNOSIS — K91.2 POSTOPERATIVE INTESTINAL MALABSORPTION: Primary | ICD-10-CM

## 2019-02-04 NOTE — PROGRESS NOTES
1. Have you been to the ER, urgent care clinic since your last visit? Hospitalized since your last visit? No    2. Have you seen or consulted any other health care providers outside of the [address] [place] [address] since your last visit? Include any pap smears or colon screening.  No

## 2019-02-12 DIAGNOSIS — G43.909 MIGRAINE WITHOUT STATUS MIGRAINOSUS, NOT INTRACTABLE, UNSPECIFIED MIGRAINE TYPE: ICD-10-CM

## 2019-02-12 DIAGNOSIS — M79.7 FIBROMYALGIA: ICD-10-CM

## 2019-02-12 DIAGNOSIS — M79.18 MYOFASCIAL MUSCLE PAIN: ICD-10-CM

## 2019-02-12 RX ORDER — TRAMADOL HYDROCHLORIDE 50 MG/1
TABLET ORAL
Qty: 30 TAB | Refills: 0 | Status: SHIPPED | OUTPATIENT
Start: 2019-02-12 | End: 2019-04-20 | Stop reason: SDUPTHER

## 2019-02-26 NOTE — PROGRESS NOTES
Chief Complaint   Patient presents with    Surgical Follow-up     2 years 6 months s/p lap gastric bypass down 87 pounds, lost 4 pounds       Fanny Francisco is 2.5 years status post Malabsorptive gastric bypass for treatment of morbid obesity . Presents today for obesity management. Patient has lost 87 lbs. Since surgery. Patient is satisfied with progress. Patient is consuming 60 grams of protein daily. Patient is drinking 48 oz of fluids per day. Bowels moving slowly and she is Constipated   Increasing water and natural methods   Nausea  Prn and takes zofran   Regurgitation  no  No fever or chills, chest pain or shortness of breath. Vitamin compliance yes  Activity  Walking and yoga   Sleep   Good   Seeing [facility] for mental health support     Physical Exam  Visit Vitals  /80   Pulse 81   Temp 98.6 °F (37 °C)   Resp 20   Ht 5' 3\" (1.6 m)   Wt 164 lb 8 oz (74.6 kg)   LMP 01/29/2011   SpO2 97%   BMI 29.14 kg/m²     A + O x 3  Chest  CTA, unlabored   COR  RRR  ABD Soft, lap sites  well healed; NT/ND, no masses or hernias   EXT No edema; ambulating independently       ICD-10-CM ICD-9-CM    1. 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 BMI 29.0-29.9,adult P56.67 V85.25        Fanny Francisco is 2.5 years s/p Malabsorptive gastric bypass for treatment of morbid obesity  doing well   Diet bariatric and RD available   Continue vitamins and protein supplements   Activity daily walking 10 minutes every hour   Sleep hygiene reviewed   Follow-up in 6 moths   Support group  Fanny Francisco verbalized understanding and questions were answered to the best of my knowledge and ability. Diet, activity and mindfulness educational materials were provided. 17 minutes spent in face to face with Fannymaddie Rothory > 50% counseling.

## 2019-02-28 ENCOUNTER — OFFICE VISIT (OUTPATIENT)
Dept: INTERNAL MEDICINE CLINIC | Age: 49
End: 2019-02-28

## 2019-02-28 VITALS
RESPIRATION RATE: 16 BRPM | BODY MASS INDEX: 29.41 KG/M2 | HEIGHT: 63 IN | HEART RATE: 72 BPM | SYSTOLIC BLOOD PRESSURE: 118 MMHG | DIASTOLIC BLOOD PRESSURE: 80 MMHG | TEMPERATURE: 98 F | OXYGEN SATURATION: 99 % | WEIGHT: 166 LBS

## 2019-02-28 DIAGNOSIS — Z78.0 POST-MENOPAUSAL: ICD-10-CM

## 2019-02-28 DIAGNOSIS — F41.9 ANXIETY: ICD-10-CM

## 2019-02-28 DIAGNOSIS — Z00.00 WELLNESS EXAMINATION: Primary | ICD-10-CM

## 2019-02-28 DIAGNOSIS — F34.1 DYSTHYMIA: Chronic | ICD-10-CM

## 2019-02-28 DIAGNOSIS — M79.7 FIBROMYALGIA: ICD-10-CM

## 2019-02-28 DIAGNOSIS — E03.4 HYPOTHYROIDISM DUE TO ACQUIRED ATROPHY OF THYROID: ICD-10-CM

## 2019-02-28 DIAGNOSIS — Z98.84 STATUS FOLLOWING GASTRIC BYPASS FOR WEIGHT LOSS: ICD-10-CM

## 2019-02-28 DIAGNOSIS — R11.0 NAUSEA: ICD-10-CM

## 2019-02-28 DIAGNOSIS — G43.909 MIGRAINE WITHOUT STATUS MIGRAINOSUS, NOT INTRACTABLE, UNSPECIFIED MIGRAINE TYPE: ICD-10-CM

## 2019-02-28 PROBLEM — M18.9 OSTEOARTHRITIS OF CARPOMETACARPAL (CMC) JOINT OF THUMB: Status: ACTIVE | Noted: 2017-07-17

## 2019-02-28 RX ORDER — BUTALBITAL, ACETAMINOPHEN AND CAFFEINE 50; 325; 40 MG/1; MG/1; MG/1
TABLET ORAL
Qty: 40 TAB | Refills: 1 | Status: SHIPPED | OUTPATIENT
Start: 2019-02-28 | End: 2019-04-20 | Stop reason: SDUPTHER

## 2019-02-28 RX ORDER — BACLOFEN 10 MG/1
10 TABLET ORAL 3 TIMES DAILY
Qty: 60 TAB | Refills: 1 | Status: SHIPPED | OUTPATIENT
Start: 2019-02-28 | End: 2019-03-21 | Stop reason: SDUPTHER

## 2019-02-28 RX ORDER — LORATADINE 10 MG/1
10 TABLET ORAL
COMMUNITY
End: 2020-05-19

## 2019-02-28 RX ORDER — BUSPIRONE HYDROCHLORIDE 5 MG/1
5 TABLET ORAL AS NEEDED
Qty: 60 TAB | Refills: 1 | Status: SHIPPED | OUTPATIENT
Start: 2019-02-28

## 2019-02-28 RX ORDER — ONDANSETRON 4 MG/1
TABLET, ORALLY DISINTEGRATING ORAL
Qty: 30 TAB | Refills: 1 | Status: SHIPPED | OUTPATIENT
Start: 2019-02-28 | End: 2019-08-05

## 2019-02-28 NOTE — PROGRESS NOTES
Pili Choudhary is a 51 y.o. female who presents today for Complete Physical 
. She has a history of  
Patient Active Problem List  
Diagnosis Code  Anxiety F41.9  Depression F32.9  Fibromyalgia M79.7  Hypothyroidism due to acquired atrophy of thyroid E03.4  Status following gastric bypass for weight loss Z98.84  
 Osteoarthritis of carpometacarpal (CMC) joint of thumb M18.9 Han Saunas Today patient is here for complete physical exam. 
This is a patient of Dr. Isai Duke. Hypothyroidism: Patient did have her levothyroxine decreased in November 2 150 mcg. We will repeat her TSH today. Chronic myofascial pain: Patient taking gabapentin and as needed tramadol.  verified and last filled tramadol was February 13th. Notes that flexeril makes her tired. I discussed we could try baclofen during the day to see if this makes her less tired. Anxiety: Taking SSRI, wellbutrin and Xanax. Was on Buspar in the past and would like to resume this. Seeing Dr. Micki Robertson for psych. S/p Gastric bypass (marilu-n-y) almost 3 yrs ago. No dumping syndrome. Still follows with surgery Health maintenance hx includes: 
Exercise: moderately active. Form of exercise: Walking. 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 At home with . Screening:  
 Colon cancer screening:  Last Colonoscopy:N/A Breast cancer screening: Due 
 Cervical cancer screening: last PAP/Pelvic exam: N/A Osteoporosis screening:  Last BMD: N/A Immunizations: 
  
Immunization History Administered Date(s) Administered  Influenza Vaccine 11/12/2013, 10/20/2018  Influenza Vaccine (Quad) PF 10/13/2015, 11/06/2017  Influenza Vaccine (Whole Virus) 03/01/2013  Influenza Vaccine PF 10/23/2014  Influenza Vaccine Split 11/02/2011  Pneumococcal Polysaccharide (PPSV-23) 03/11/2015  Tdap 11/12/2013 Immunization status: up to date and documented. ROS Review of Systems Constitutional: Negative for chills, fever and weight loss. HENT: Negative for congestion and sore throat. Eyes: Negative for blurred vision, double vision, photophobia and pain. Respiratory: Negative for cough, hemoptysis, sputum production and shortness of breath. Cardiovascular: Negative for chest pain, palpitations, orthopnea, claudication and leg swelling. Gastrointestinal: Negative for abdominal pain, constipation, diarrhea, heartburn, nausea and vomiting. Genitourinary: Negative for dysuria, frequency and urgency. Musculoskeletal: Positive for back pain and myalgias. Negative for joint pain and neck pain. Skin: Negative for rash. Neurological: Negative. Negative for headaches. Endo/Heme/Allergies: Does not bruise/bleed easily. Psychiatric/Behavioral: Negative for memory loss and suicidal ideas. Visit Vitals /80 (BP 1 Location: Left arm, BP Patient Position: Sitting) Pulse 72 Temp 98 °F (36.7 °C) (Oral) Resp 16 Ht 5' 3\" (1.6 m) Wt 166 lb (75.3 kg) SpO2 99% BMI 29.41 kg/m² Physical Exam  
Constitutional: She is oriented to person, place, and time. She appears well-developed and well-nourished. HENT:  
Head: Normocephalic and atraumatic. Right Ear: External ear normal.  
Left Ear: External ear normal.  
Mouth/Throat: No oropharyngeal exudate. Eyes: EOM are normal. Pupils are equal, round, and reactive to light. Cardiovascular: Normal rate and regular rhythm. No murmur heard. Pulmonary/Chest: Effort normal. No respiratory distress. Abdominal: Soft. Bowel sounds are normal. She exhibits no distension and no mass. There is no tenderness. There is no guarding. Neurological: She is alert and oriented to person, place, and time. Skin: Skin is warm and dry. Psychiatric: She has a normal mood and affect.  Her behavior is normal.  
 
 
 
 Current Outpatient Medications Medication Sig  loratadine (CLARITIN) 10 mg tablet Take 10 mg by mouth.  traMADol (ULTRAM) 50 mg tablet TAKE 1 TABLET EVERY 6 HOURS AS NEEDED FOR PAIN  
 famotidine (PEPCID) 40 mg tablet TAKE 1 TABLET BY MOUTH TWICE A DAY  
 SYNTHROID 150 mcg tablet TAKE 1 TAB BY MOUTH DAILY BEFORE BREAKFAST  gabapentin (NEURONTIN) 300 mg capsule TAKE 2 CAPSULES BY MOUTH THREE (3) TIMES DAILY.  escitalopram oxalate (LEXAPRO) 20 mg tablet TAKE 1 TABLET BY MOUTH EVERY DAY  butalbital-acetaminophen-caffeine (FIORICET, ESGIC) -40 mg per tablet TAKE 1 TABLET BY MOUTH EVERY 6 HOURS AS NEEDED FOR PAIN  
 ALPRAZolam (XANAX) 0.5 mg tablet Take 1 Tab by mouth three (3) times daily as needed for Anxiety. Max Daily Amount: 1.5 mg.  
 buPROPion SR (WELLBUTRIN SR) 100 mg SR tablet TAKE 1 TABLET BY MOUTH EVERY DAY  hyoscyamine SL (LEVSIN/SL) 0.125 mg SL tablet TAKE 1 TAB BY SUBLINGUAL ROUTE EVERY FOUR (4) HOURS AS NEEDED FOR CRAMPING.  ondansetron (ZOFRAN ODT) 4 mg disintegrating tablet TAKE 1 TAB BY MOUTH EVERY SIX (6) HOURS AS NEEDED FOR NAUSEA.  vitamin e (E GEMS) 1,000 unit capsule Take 1,000 Units by mouth daily.  b complex vitamins (B COMPLEX 1) tablet Take 1 Tab by mouth daily.  COQ10, UBIQUINOL, PO Take  by mouth.  cinnamon bark 500 mg cap Take 500 mg by mouth.  echinacea purpurea root 80 mg cap Take  by mouth.  cyclobenzaprine (FLEXERIL) 10 mg tablet TAKE 1 TABLET BY MOUTH 3 TIMES A DAY AS NEEDED MUSCLE SPASM  gabapentin (NEURONTIN) 300 mg capsule TAKE ONE CAPSULE BY MOUTH IN THE MORNING, 1 CAPSULE AT NOON, AND 2 CAPSULES AT BEDTIME (Patient taking differently: TAKE ONE CAPSULE BY MOUTH IN THE MORNING, 1 CAPSULE AT NOON, 2 capsules as dinner, AND 2 CAPSULES AT BEDTIME)  rizatriptan (MAXALT-MLT) 10 mg disintegrating tablet Take 1 Tab by mouth once as needed for Migraine for up to 1 dose.  ondansetron (ZOFRAN ODT) 4 mg disintegrating tablet TAKE 1 TABLET BY MOUTH EVERY 8 HOURS AS NEEDED FOR NAUSEA  cyanocobalamin 1,000 mcg tablet Take 5,000 mcg by mouth daily.  pediatric multivitamin no.76 (FLINTSTONES COMPLETE) chew Take 1 Tab by mouth two (2) times a day.  polyethylene glycol (MIRALAX) 17 gram packet Take 17 g by mouth daily.  busPIRone (BUSPAR) 5 mg tablet Take 5 mg by mouth as needed.  ascorbic acid, vitamin C, (VITAMIN C) 250 mg tablet Take 500 mg by mouth two (2) times a day.  LACTOBACILLUS RHAMNOSUS GG (CULTURELLE PO) Take 1 Tab by mouth daily.  magnesium 250 mg tab Take 500 mg by mouth daily.  calcium citrate-vitamin d3 (CITRACAL+D) 315-200 mg-unit tab Take 2 Tabs by mouth two (2) times daily (with meals). Indications: Pt states, \"medication has been change to take at noon and dinner. \"  wheat dextrin (BENEFIBER CLEAR) 3 gram/3.5 gram powd Take  by mouth daily. 2 Teaspoons daily AT 7AM AND 5PM  
 cholecalciferol, VITAMIN D3, (VITAMIN D3) 5,000 unit tab tablet Take 5,000 Units by mouth two (2) times a day. LIQUID SOFT GEL  fluticasone (FLONASE) 50 mcg/actuation nasal spray 2 Sprays by Both Nostrils route nightly as needed for Rhinitis (as needed).  peg 400-propylene glycol (SYSTANE) 0.4-0.3 % drop Administer 1 Drop to both eyes as needed.  cetirizine (ZYRTEC) 10 mg tablet Take  by mouth nightly.  ketoconazole (NIZORAL) 2 % topical cream Apply  to affected area daily. (Patient not taking: Reported on 11/7/2018) No current facility-administered medications for this visit. Past Medical History:  
Diagnosis Date  Arthritis  Autoimmune disease ([facility] FIBROMYALGIA  
 BMI 45.0-49.9, adult ([facility]  Chronic pain   
 fibromyalgia  Depression   
 and anxiety  Diabetes ([facility] HX NIDDM (NO MEDS 4-12-17)  Fibromyalgia  GERD (gastroesophageal reflux disease) 2013  H/O seasonal allergies  Headache(784.0)  Hypertension HX HTN, NO MEDS(17)  Migraines  Morbid obesity ([facility] HX: GASTRIC BYPASS  LINO on CPAP   
 NOT CURRENTLY USING CPAP (SINCE 2017)  Thyroid disease HYPOTHYROID Past Surgical History:  
Procedure Laterality Date  HX GASTRIC BYPASS  2016  HX GYN    
 ECTOPIC PREGNANCE LEFT TUBE REMOVED  HX HERNIA REPAIR N/A 2017  
 lower midline and periumbilical, robotic incisional hernia repairs, Lakesha Barnhart  HX HYSTERECTOMY    HX LAP CHOLECYSTECTOMY  2017  HX LAP GASTRIC BYPASS  16  
 by Dr Lakesha Barnhart  HX OOPHORECTOMY    
 right ovarian tumor  HX ORTHOPAEDIC  2018  
 rt thumb  HX TUBAL LIGATION Social History Tobacco Use  Smoking status: Former Smoker Packs/day: 1.00 Years: 20.00 Pack years: 20.00 Types: Cigarettes Last attempt to quit: 3/4/2005 Years since quittin.9  Smokeless tobacco: Never Used Substance Use Topics  Alcohol use: No  
  Alcohol/week: 0.0 oz Family History Problem Relation Age of Onset  Diabetes Mother  Hypertension Mother  Crohn's Disease Mother  Asthma Mother  Lung Disease Mother COPD  Migraines Mother  Cancer Mother SKIN  
 Depression Mother Smith Other Mother FIBROMYALGIA  
 Gout Mother Smith Arthritis-osteo Mother  Cancer Father   
     oral  
 Diabetes Father   
     steroid induced  Liver Disease Father HEP C  
 Alcohol abuse Sister  Heart Disease Sister  Migraines Sister  Depression Sister BIPOLAR DISORDER  Anesth Problems Daughter N&V  
 Asthma Daughter  Cancer Paternal Grandmother   
     colon Allergies Allergen Reactions  Sulfa (Sulfonamide Antibiotics) Anaphylaxis and Other (comments) Difficulty breathing/high fever/delusional    
 Aleve [Naproxen Sodium] Nausea Only  Codeine Nausea Only  Ibuprofen Hives  Lactose Other (comments) Gas  Penicillin G Hives Has had cephalexin without issue. Assessment/Plan Diagnoses and all orders for this visit: 
 
1. Wellness examination -discussed that she will need colonoscopy later this year. She is due for her mammogram will get this done. Pili Choudhary was counseled on age-appropriate/ guideline-based risk prevention behaviors and screening for a 51y.o. year old   female . We also discussed adjustments in screening based on family history if necessary. Printed instructions for preventative screening guidelines and healthy behaviors given to patient with after visit summary. 
 
-     LIPID PANEL 
-     METABOLIC PANEL, COMPREHENSIVE 
-     TSH 3RD GENERATION 2. Migraine without status migrainosus, not intractable, unspecified migraine type -PRN use -     butalbital-acetaminophen-caffeine (FIORICET, ESGIC) -40 mg per tablet; TAKE 1 TABLET BY MOUTH EVERY 6 HOURS AS NEEDED FOR PAIN 
 
3. Nausea -PRN 
-     ondansetron (ZOFRAN ODT) 4 mg disintegrating tablet; TAKE 1 TABLET BY MOUTH EVERY 8 HOURS AS NEEDED FOR NAUSEA 4. Hypothyroidism due to acquired atrophy of thyroid -due for repeat -     LIPID PANEL 
-     METABOLIC PANEL, COMPREHENSIVE 
-     TSH 3RD GENERATION 5. Dysthymia 6. Fibromyalgia -still chronic pain but Flexeril makes her too tired the day. We discussed trying baclofen. Discussed the physical activity and aerobic exercises are very important for chronic fibromyalgia 
-     baclofen (LIORESAL) 10 mg tablet; Take 1 Tab by mouth three (3) times daily. 7. Status following gastric bypass for weight loss -will check bone density -     DEXA BONE DENSITY STUDY AXIAL; Future 8. Anxiety -benzos are too sedating during the day. Patient was using BuSpar as needed in the past and will resume this 
-     busPIRone (BUSPAR) 5 mg tablet; Take 1 Tab by mouth as needed (anxiety).  
 
9. Post-menopausal 
 -     DEXA BONE DENSITY STUDY AXIAL; Future Follow-up Disposition: Not on File Misty Lara MD 
2/28/2019

## 2019-02-28 NOTE — PATIENT INSTRUCTIONS

## 2019-02-28 NOTE — PROGRESS NOTES
Pt is due for diabetic foot exam. 
 
Pt sees Dr. Alexandra Corona for her eye exams. Pt would like to discuss changing flexeril to Tizanidine. Pt sees Reji Doran for psychology.

## 2019-03-01 LAB
ALBUMIN SERPL-MCNC: 4.1 G/DL (ref 3.5–5.5)
ALBUMIN/GLOB SERPL: 2 {RATIO} (ref 1.2–2.2)
ALP SERPL-CCNC: 86 IU/L (ref 39–117)
ALT SERPL-CCNC: 28 IU/L (ref 0–32)
AST SERPL-CCNC: 26 IU/L (ref 0–40)
BILIRUB SERPL-MCNC: 0.3 MG/DL (ref 0–1.2)
BUN SERPL-MCNC: 11 MG/DL (ref 6–24)
BUN/CREAT SERPL: 17 (ref 9–23)
CALCIUM SERPL-MCNC: 8.8 MG/DL (ref 8.7–10.2)
CHLORIDE SERPL-SCNC: 102 MMOL/L (ref 96–106)
CHOLEST SERPL-MCNC: 160 MG/DL (ref 100–199)
CO2 SERPL-SCNC: 26 MMOL/L (ref 20–29)
CREAT SERPL-MCNC: 0.65 MG/DL (ref 0.57–1)
GLOBULIN SER CALC-MCNC: 2.1 G/DL (ref 1.5–4.5)
GLUCOSE SERPL-MCNC: 83 MG/DL (ref 65–99)
HDLC SERPL-MCNC: 48 MG/DL
INTERPRETATION, 910389: NORMAL
LDLC SERPL CALC-MCNC: 98 MG/DL (ref 0–99)
POTASSIUM SERPL-SCNC: 4.3 MMOL/L (ref 3.5–5.2)
PROT SERPL-MCNC: 6.2 G/DL (ref 6–8.5)
SODIUM SERPL-SCNC: 140 MMOL/L (ref 134–144)
TRIGL SERPL-MCNC: 70 MG/DL (ref 0–149)
TSH SERPL DL<=0.005 MIU/L-ACNC: 0.62 UIU/ML (ref 0.45–4.5)
VLDLC SERPL CALC-MCNC: 14 MG/DL (ref 5–40)

## 2019-03-19 RX ORDER — BUPROPION HYDROCHLORIDE 100 MG/1
TABLET, EXTENDED RELEASE ORAL
Qty: 30 TAB | Refills: 5 | Status: SHIPPED | OUTPATIENT
Start: 2019-03-19

## 2019-03-19 NOTE — TELEPHONE ENCOUNTER
Last visit noted, labs checked and refill deemed appropriate at this time. Verbal refill order authorized by covering physicians at [facility] for Dr. Mariella Jurado during his absence.     Joaquim Estevez, PardeepD, BCPS, CDE

## 2019-03-21 DIAGNOSIS — M79.7 FIBROMYALGIA: ICD-10-CM

## 2019-03-21 RX ORDER — BACLOFEN 10 MG/1
TABLET ORAL
Qty: 60 TAB | Refills: 1 | Status: SHIPPED | OUTPATIENT
Start: 2019-03-21

## 2019-04-11 RX ORDER — CYCLOBENZAPRINE HCL 10 MG
20 TABLET ORAL
Qty: 60 TAB | Refills: 2 | Status: SHIPPED | OUTPATIENT
Start: 2019-04-11 | End: 2020-02-25

## 2019-04-11 NOTE — TELEPHONE ENCOUNTER
Sent in 3 months. Patient to establish with a new provider in the future. Okay to take baclofen during the day and Flexeril only at night.

## 2019-04-11 NOTE — TELEPHONE ENCOUNTER
Called patient to confirm that she needed a refill on flexeril and not baclofen. Patient states that she takes the baclofen during the day and then additionally takes 2 flexeril at night. Advised I would discuss with Dr. Ayanna Dougherty and let her know.     Dulce Lopez, PharmD, BCPS, CDE

## 2019-04-12 NOTE — TELEPHONE ENCOUNTER
Called patient and advised that prescription had been sent in with new directions. Also advised that she needs to establish care with new PCP as Dr. Elijah Mejia is not accepting new patients at this time. Patient requesting a list of offices mailed to her - will have PSR send this out.     Gabi Cruz, PardeepD, BCPS, CDE

## 2019-04-15 NOTE — TELEPHONE ENCOUNTER
Reached out to patient to advise refill had been taken  care of and to verify address to mail out list of practices. Patient began to go into a spiral of how we are throwing her out of the practice. Patient was advised that is not at all true Dr Cesar Wetzel and his partners have thought long and hard regarding the final outcome to send the patient's outside the practice because [facility] is @ capacity  For patient's    and would not want to compromise care. Patient still did not understand she feels as if we have done a wrong to her. Patient was offered again apologies on [facility] [id number]. List of practices has been mailed. - thank you

## 2019-04-20 DIAGNOSIS — G43.909 MIGRAINE WITHOUT STATUS MIGRAINOSUS, NOT INTRACTABLE, UNSPECIFIED MIGRAINE TYPE: ICD-10-CM

## 2019-04-22 RX ORDER — HYOSCYAMINE SULFATE 0.12 MG/1
TABLET SUBLINGUAL
Qty: 30 TAB | Refills: 1 | Status: SHIPPED | OUTPATIENT
Start: 2019-04-22 | End: 2019-08-05

## 2019-04-23 ENCOUNTER — HOSPITAL ENCOUNTER (OUTPATIENT)
Dept: MAMMOGRAPHY | Age: 49
Discharge: HOME | End: 2019-04-23
Attending: INTERNAL MEDICINE
Payer: COMMERCIAL

## 2019-04-23 DIAGNOSIS — Z98.84 STATUS FOLLOWING GASTRIC BYPASS FOR WEIGHT LOSS: ICD-10-CM

## 2019-04-23 DIAGNOSIS — Z12.39 SCREENING BREAST EXAMINATION: ICD-10-CM

## 2019-04-23 DIAGNOSIS — Z78.0 POST-MENOPAUSAL: ICD-10-CM

## 2019-04-23 PROCEDURE — 77067 SCR MAMMO BI INCL CAD: CPT

## 2019-04-23 PROCEDURE — 77080 DXA BONE DENSITY AXIAL: CPT

## 2019-04-23 RX ORDER — BUTALBITAL, ACETAMINOPHEN AND CAFFEINE 50; 325; 40 MG/1; MG/1; MG/1
TABLET ORAL
Qty: 40 TAB | Refills: 1 | Status: SHIPPED | OUTPATIENT
Start: 2019-04-23 | End: 2021-12-07 | Stop reason: ALTCHOICE

## 2019-08-05 ENCOUNTER — OFFICE VISIT (OUTPATIENT)
Dept: SURGERY | Age: 49
End: 2019-08-05

## 2019-08-05 VITALS
SYSTOLIC BLOOD PRESSURE: 111 MMHG | TEMPERATURE: 97.9 F | WEIGHT: 161 LBS | DIASTOLIC BLOOD PRESSURE: 75 MMHG | RESPIRATION RATE: 18 BRPM | HEART RATE: 54 BPM | BODY MASS INDEX: 28.53 KG/M2 | OXYGEN SATURATION: 98 % | HEIGHT: 63 IN

## 2019-08-05 DIAGNOSIS — K91.2 POSTOPERATIVE INTESTINAL MALABSORPTION: Primary | ICD-10-CM

## 2019-08-05 NOTE — PROGRESS NOTES
Chief Complaint   Patient presents with    Surgical Follow-up     3yrs s/p gastric bypass, down 90.5lbs lost 3.5lbs       Rachel Handley is 3 years status post Malabsorptive gastric bypass for treatment of morbid obesity . Presents today for obesity management. Patient has lost 90 lbs. Since surgery. Patient is satisfied with progress. Patient is consuming 55-65 grams of protein daily. Patient is drinking 60 oz of fluids per day. Bowels moving 1-3 times daily. Constipated  no  Using laxatives no  Nausea  Prn (diet related)  Regurgitation  Prn (if eats too much)   Still gets dumping with any sugar   Can't eat fried foods   Relies on protein bars with traveling, crock pot meals work well   Vitamin compliance yes  Activity  Walks all the time   Sleep   Good     Upset at times when she sees others eating whatever they want and if she does she will be sick     Co-Morbid(s)     Was anti coagulation initiated for presumed / confirmed DVT/PE? NO    Was an incisional hernia noted on exam?       NO      COMORBIDITY     SLEEP APNEA                 NO        GERD  (req.meds)           NO  HYPERLIPIDEMIA           NO  HYPERTENSION             NO       IF YES, # OF HTN MEDICATIONS 0  DIABETES                        NO      IF YES, 0 NON-INSULIN   0  INSULIN     Physical Exam  Visit Vitals  /75 (BP 1 Location: Left arm, BP Patient Position: Sitting)   Pulse (!) 54   Temp 97.9 °F (36.6 °C) (Oral)   Resp 18   Ht 5' 3\" (1.6 m)   Wt 161 lb (73 kg)   LMP 01/29/2011   SpO2 98%   BMI 28.52 kg/m²     A + O x 3  Chest  CTA, unlabored   COR  RRR  ABD Soft, NT/ND, no masses or hernias   EXT No edema; ambulating independently       ICD-10-CM ICD-9-CM    1. 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  BMI 28.0-28.9,adult K52.88 V85.24        Rachel Handley is 3 years  s/p Malabsorptive gastric bypass for treatment of morbid obesity   She has lost >90% excess weight   Referred to [facility] and explained was \"normal\" to still experience dumping and adverse effects if makes poor food choice    Diet protein and produce   Avoid added sugars and fatty foods   Continue vitamins and protein supplements   Activity daily walking 10 minutes every hour   Sleep hygiene reviewed  Prefers to get labs with PCP    Follow-up in 6 months   Support group  Mary Sanchez verbalized understanding and questions were answered to the best of my knowledge and ability. Diet, activity and mindfulness educational materials were provided. 21 minutes spent in face to face with Mary Sanchez > 50% counseling.

## 2019-08-05 NOTE — PROGRESS NOTES
1. Have you been to the ER, urgent care clinic since your last visit? Hospitalized since your last visit? No    2. Have you seen or consulted any other health care providers outside of the [address] [place] [address] since your last visit? Include any pap smears or colon screening.  No

## 2019-08-05 NOTE — PATIENT INSTRUCTIONS
Schedule an appointment with the dietician, please call or email   Lena Huffman RD at:    [phone]  [email or web address]      Annual labs after gastric bypass:    CBC  CMP   Iron  B12 and folate   Vit D, 25

## 2019-10-14 RX ORDER — FAMOTIDINE 40 MG/1
TABLET, FILM COATED ORAL
Qty: 180 TAB | Refills: 2 | Status: SHIPPED | OUTPATIENT
Start: 2019-10-14 | End: 2020-12-22

## 2020-02-25 ENCOUNTER — OFFICE VISIT (OUTPATIENT)
Dept: SURGERY | Age: 50
End: 2020-02-25

## 2020-02-25 VITALS
BODY MASS INDEX: 27.57 KG/M2 | TEMPERATURE: 97.8 F | WEIGHT: 155.6 LBS | DIASTOLIC BLOOD PRESSURE: 78 MMHG | SYSTOLIC BLOOD PRESSURE: 128 MMHG | RESPIRATION RATE: 17 BRPM | HEART RATE: 56 BPM | HEIGHT: 63 IN | OXYGEN SATURATION: 97 %

## 2020-02-25 DIAGNOSIS — E53.8 B12 DEFICIENCY: ICD-10-CM

## 2020-02-25 DIAGNOSIS — K91.2 POSTOPERATIVE INTESTINAL MALABSORPTION: Primary | ICD-10-CM

## 2020-02-25 DIAGNOSIS — E55.9 VITAMIN D DEFICIENCY: ICD-10-CM

## 2020-02-25 DIAGNOSIS — R19.4 BOWEL HABIT CHANGES: ICD-10-CM

## 2020-02-25 DIAGNOSIS — E61.1 IRON DEFICIENCY: ICD-10-CM

## 2020-02-25 DIAGNOSIS — K91.2 POSTOPERATIVE INTESTINAL MALABSORPTION: ICD-10-CM

## 2020-02-25 DIAGNOSIS — R11.0 NAUSEA: ICD-10-CM

## 2020-02-25 RX ORDER — HYOSCYAMINE SULFATE 0.12 MG/1
TABLET SUBLINGUAL
COMMUNITY
Start: 2019-12-19 | End: 2020-02-25 | Stop reason: SDUPTHER

## 2020-02-25 RX ORDER — PREGABALIN 150 MG/1
CAPSULE ORAL
COMMUNITY
Start: 2020-02-21

## 2020-02-25 RX ORDER — HYOSCYAMINE SULFATE 0.12 MG/1
0.12 TABLET SUBLINGUAL
Qty: 30 TAB | Refills: 2 | Status: SHIPPED | OUTPATIENT
Start: 2020-02-25

## 2020-02-25 RX ORDER — ONDANSETRON 4 MG/1
4 TABLET, ORALLY DISINTEGRATING ORAL
Qty: 20 TAB | Refills: 2 | Status: SHIPPED | OUTPATIENT
Start: 2020-02-25

## 2020-02-25 RX ORDER — TOPIRAMATE 50 MG/1
TABLET, FILM COATED ORAL
COMMUNITY
Start: 2020-02-02

## 2020-02-25 RX ORDER — TRAMADOL HYDROCHLORIDE 50 MG/1
TABLET ORAL
COMMUNITY
Start: 2020-02-20

## 2020-02-25 NOTE — Clinical Note
Needs EGD and colonoscopy / order is in if you would please send request to [id number]  - Thanks

## 2020-02-25 NOTE — PROGRESS NOTES
1. Have you been to the ER, urgent care clinic since your last visit? Hospitalized since your last visit? No    2. Have you seen or consulted any other health care providers outside of the [address] [place] [address] since your last visit? Include any pap smears or colon screening.  No

## 2020-02-25 NOTE — PATIENT INSTRUCTIONS
The GI office will contact you about the upper endoscopy and colonoscopy     Dr. Leatha Wooten [phone]     Step back and look at your diet:  Add more produce / plants and get back to basics     I'll follow up when I get your labs back

## 2020-02-25 NOTE — PROGRESS NOTES
Chief Complaint   Patient presents with    Weight Loss     Down  95.6lbs. Loss 5.6 lbs. Ally Martinez is 3.5 years status post Malabsorptive gastric bypass for treatment of morbid obesity . Presents today for obesity management. Patient has lost 95+ lbs. Since surgery. She is concerned because the past few months she has continued to lose weight and is not feeling great. She is having more nausea and abdominal cramping. Takes nausea rx (zofran) and levsin for cramping 2-3x/ week   No emesis and no bloating (unless overeats)   Has had change in bowels and having more frequent loose stools for which she takes an Immodium and then has a lot of gas   She has never had a colonoscopy and has a FH of colon cancer   Denies black or bloody stools   Recall: Hot tea 2-3 cups   b- Premiere protein bar  l- premiere protein bar   D- ukrops chicken salad   Cheetos   Tea   No fever or chills, chest pain or shortness of breath. Vitamin compliance 90% of the time   Activity  \"always on the go\"  Sleep   Ok   Denies NSAIDS, tobacco and alcohol   Physical Exam  Visit Vitals  /78 (BP 1 Location: Left arm, BP Patient Position: Sitting)   Pulse (!) 56   Temp 97.8 °F (36.6 °C) (Oral)   Resp 17   Ht 5' 3\" (1.6 m)   Wt 155 lb 9.6 oz (70.6 kg)   LMP 01/29/2011   SpO2 97%   BMI 27.56 kg/m²     A + O x 3, looks well   No hair loss   Chest  CTA, unlabored   COR  RRR  ABD Soft, NT/ND, no masses or hernias   EXT No edema; ambulating independently       ICD-10-CM ICD-9-CM    1.  Postoperative intestinal malabsorption K91.2 579.3 CBC W/O DIFF      VITAMIN B12 & FOLATE      VITAMIN D, 25 HYDROXY      IRON PROFILE      METABOLIC PANEL, COMPREHENSIVE      REFERRAL TO GASTROENTEROLOGY   2. Nausea R11.0 787.02 CBC W/O DIFF      VITAMIN B12 & FOLATE      VITAMIN D, 25 HYDROXY      IRON PROFILE      METABOLIC PANEL, COMPREHENSIVE      REFERRAL TO GASTROENTEROLOGY   3. Bowel habit changes R19.4 787.99 CBC W/O DIFF      VITAMIN B12 & FOLATE      VITAMIN D, 25 HYDROXY      IRON PROFILE      METABOLIC PANEL, COMPREHENSIVE      REFERRAL TO GASTROENTEROLOGY   4. BMI 27.0-27.9,adult Z68.27 V85.23 CBC W/O DIFF      VITAMIN B12 & FOLATE      VITAMIN D, 25 HYDROXY      IRON PROFILE      METABOLIC PANEL, COMPREHENSIVE   5. Iron deficiency E61.1 280.9 CBC W/O DIFF      VITAMIN B12 & FOLATE      VITAMIN D, 25 HYDROXY      IRON PROFILE      METABOLIC PANEL, COMPREHENSIVE   6. B12 deficiency E53.8 266.2 CBC W/O DIFF      VITAMIN B12 & FOLATE      VITAMIN D, 25 HYDROXY      IRON PROFILE      METABOLIC PANEL, COMPREHENSIVE   7. Vitamin D deficiency E55.9 268.9 CBC W/O DIFF      VITAMIN B12 & FOLATE      VITAMIN D, 25 HYDROXY      IRON PROFILE      METABOLIC PANEL, COMPREHENSIVE       Mauricio Maurer is 3.5 years s/p Malabsorptive gastric bypass for treatment of morbid obesity   Recent change in bowel habits, increased nausea and cramping   Suspect most of her issues are related to diet (no fiber, bars with sugar alcohols and snack foods)   She has no gallbladder and symptoms are not consistent with ulcer or internal hernia   Recommend evaluation with GI for screening colonoscopy with age of 47 and FH of Colon CA   Requested EGD at time of colonoscopy to evaluate pouch and gastric bypass anatomy as she has always been more limited/ restricted with food and persistent nausea    Diet re-evaluate and get back to basic bariatric diet / soft and mushy and limit protein bars   Labs today    Continue vitamins and protein supplements   Activity daily walking 10 minutes every hour   Sleep hygiene reviewed   Follow-up in 3 months    Support group  Mauricio Maurer verbalized understanding and questions were answered to the best of my knowledge and ability. Diet, activity and mindfulness educational materials were provided. 28 minutes spent in face to face with Mauricio Maurer > 50% counseling.

## 2020-02-26 LAB
25(OH)D3+25(OH)D2 SERPL-MCNC: 48.5 NG/ML (ref 30–100)
ALBUMIN SERPL-MCNC: 4.2 G/DL (ref 3.8–4.8)
ALBUMIN/GLOB SERPL: 2.1 {RATIO} (ref 1.2–2.2)
ALP SERPL-CCNC: 161 IU/L (ref 39–117)
ALT SERPL-CCNC: 72 IU/L (ref 0–32)
AST SERPL-CCNC: 29 IU/L (ref 0–40)
BILIRUB SERPL-MCNC: 0.3 MG/DL (ref 0–1.2)
BUN SERPL-MCNC: 10 MG/DL (ref 6–24)
BUN/CREAT SERPL: 16 (ref 9–23)
CALCIUM SERPL-MCNC: 9 MG/DL (ref 8.7–10.2)
CHLORIDE SERPL-SCNC: 106 MMOL/L (ref 96–106)
CO2 SERPL-SCNC: 24 MMOL/L (ref 20–29)
CREAT SERPL-MCNC: 0.63 MG/DL (ref 0.57–1)
ERYTHROCYTE [DISTWIDTH] IN BLOOD BY AUTOMATED COUNT: 11.9 % (ref 11.7–15.4)
FOLATE SERPL-MCNC: >20 NG/ML
GLOBULIN SER CALC-MCNC: 2 G/DL (ref 1.5–4.5)
GLUCOSE SERPL-MCNC: 80 MG/DL (ref 65–99)
HCT VFR BLD AUTO: 39 % (ref 34–46.6)
HGB BLD-MCNC: 13.4 G/DL (ref 11.1–15.9)
IRON SATN MFR SERPL: 35 % (ref 15–55)
IRON SERPL-MCNC: 95 UG/DL (ref 27–159)
MCH RBC QN AUTO: 31.1 PG (ref 26.6–33)
MCHC RBC AUTO-ENTMCNC: 34.4 G/DL (ref 31.5–35.7)
MCV RBC AUTO: 91 FL (ref 79–97)
PLATELET # BLD AUTO: 210 X10E3/UL (ref 150–450)
POTASSIUM SERPL-SCNC: 4.3 MMOL/L (ref 3.5–5.2)
PROT SERPL-MCNC: 6.2 G/DL (ref 6–8.5)
RBC # BLD AUTO: 4.31 X10E6/UL (ref 3.77–5.28)
SODIUM SERPL-SCNC: 142 MMOL/L (ref 134–144)
TIBC SERPL-MCNC: 275 UG/DL (ref 250–450)
UIBC SERPL-MCNC: 180 UG/DL (ref 131–425)
VIT B12 SERPL-MCNC: 1756 PG/ML (ref 232–1245)
WBC # BLD AUTO: 4.3 X10E3/UL (ref 3.4–10.8)

## 2020-03-27 ENCOUNTER — TELEPHONE (OUTPATIENT)
Dept: SURGERY | Age: 50
End: 2020-03-27

## 2020-03-27 DIAGNOSIS — K91.2 POSTOPERATIVE INTESTINAL MALABSORPTION: ICD-10-CM

## 2020-03-27 DIAGNOSIS — R79.89 ABNORMAL LFTS: Primary | ICD-10-CM

## 2020-03-27 DIAGNOSIS — R74.8 ELEVATED ALKALINE PHOSPHATASE LEVEL: ICD-10-CM

## 2020-03-27 DIAGNOSIS — R79.89 ABNORMAL LFTS: ICD-10-CM

## 2020-03-27 NOTE — TELEPHONE ENCOUNTER
Reviewed labs   Liver fxn abnl and no tylenol or ETOH   Feels better with diet changes but still with some nausea and loose stools   No acholic stool   Taking Imodium every day prn with relief   No yellowing of skin   Will repeat abnl labs in May and can review at followup   She has not seen GI and had to cancel due to transportation   Now will be rescheduled to summer due to COVID-19

## 2020-05-19 ENCOUNTER — OFFICE VISIT (OUTPATIENT)
Dept: SURGERY | Age: 50
End: 2020-05-19

## 2020-05-19 VITALS — WEIGHT: 151 LBS | HEIGHT: 63 IN | BODY MASS INDEX: 26.75 KG/M2

## 2020-05-19 DIAGNOSIS — K91.2 POSTOPERATIVE INTESTINAL MALABSORPTION: Primary | ICD-10-CM

## 2020-05-19 DIAGNOSIS — E66.01 MORBID OBESITY (HCC): ICD-10-CM

## 2020-05-19 DIAGNOSIS — R14.3 FLATULENCE: ICD-10-CM

## 2020-05-19 RX ORDER — CETIRIZINE HCL 10 MG
TABLET ORAL
COMMUNITY

## 2020-05-19 NOTE — PROGRESS NOTES
1. Have you been to the ER, urgent care clinic since your last visit? Hospitalized since your last visit? No    2. Have you seen or consulted any other health care providers outside of the [address] [place] [address] since your last visit? Include any pap smears or colon screening.  No

## 2020-05-22 NOTE — PROGRESS NOTES
I was in the office while conducting this encounter. Consent:  She and/or her healthcare decision maker is aware that this patient-initiated Telehealth encounter is a billable service, with coverage as determined by her insurance carrier. She is aware that she may receive a bill and has provided verbal consent to proceed: Yes    This virtual visit was conducted via [employer]. Pursuant to the emergency declaration under the [facility], [facility] waiver authority and the [employer] and [employer] Act, this Virtual  Visit was conducted to reduce the patient's risk of exposure to COVID-19 and provide continuity of care for an established patient. Services were provided through a video synchronous discussion virtually to substitute for in-person clinic visit. Due to this being a TeleHealth evaluation, many elements of the physical examination are unable to be assessed. Total Time: minutes: 11-20 minutes. Chief Complaint   Patient presents with    Follow-up     3 mo fu, BCB# [phone]       Sneha Rodríguez is almost 4 years  status post Malabsorptive gastric bypass for treatment of morbid obesity . Presents virtually today for obesity management. Weight has started to stabilize and she is feeling better [place]  Has felt lonely lately with the pandemic   Better since getting outdoors every day and being in nature   Minimal nausea and \"stomach better\"   60-90 grams protein every day   Rare nausea   BM most days and no more diarrhea, only complaint is foul gas odor   Vitamin compliance yes  Activity  Walking every day   Sleep   Good     Physical Exam  Visit Vitals  Ht 5' 3\" (1.6 m)   Wt 151 lb (68.5 kg)   LMP 01/29/2011   BMI 26.75 kg/m²     A + O x 3, looks well   Breathing unlabored and speech clear       ICD-10-CM ICD-9-CM    1. 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  BMI 26.0-26.9,adult Z68.26 V85.22    3. Morbid obesity ([facility] [address] E66.01 278.01    4. Flatulence R14.3 061. [address] Martin Person is 4 years  s/p Malabsorptive gastric bypass for treatment of morbid obesity  doing well   Diet protein and produce   Added fats and sugars can cause foul gas and stools   Protein supplements can also cause foul gas   Can try Devrom odor neutralizer OTC   Continue vitamins and protein supplements   Activity daily walking 10 minutes every hour   Sleep hygiene reviewed   Follow-up in August Dendron Zonia verbalized understanding and questions were answered to the best of my knowledge and ability. Diet, activity and mindfulness educational materials were provided. Reviewed medication list, problem list and allergies. 14 minutes spent virtually with patient in directed conversation with the patient to include medical condition, assessment and plan.

## 2020-05-22 NOTE — PATIENT INSTRUCTIONS
For the gas / stool odor try DEVROM and you can get on [employer] or try [place] Glad you are doing well and taking care of yourself Stay on your vitamins and see me 8/18/20 at 11:20 am

## 2020-08-18 ENCOUNTER — OFFICE VISIT (OUTPATIENT)
Dept: SURGERY | Age: 50
End: 2020-08-18
Payer: COMMERCIAL

## 2020-08-18 VITALS
TEMPERATURE: 97.9 F | OXYGEN SATURATION: 98 % | DIASTOLIC BLOOD PRESSURE: 75 MMHG | BODY MASS INDEX: 27.29 KG/M2 | HEART RATE: 66 BPM | RESPIRATION RATE: 18 BRPM | WEIGHT: 154 LBS | HEIGHT: 63 IN | SYSTOLIC BLOOD PRESSURE: 126 MMHG

## 2020-08-18 DIAGNOSIS — K91.2 POSTOPERATIVE INTESTINAL MALABSORPTION: Primary | ICD-10-CM

## 2020-08-18 PROCEDURE — 99215 OFFICE O/P EST HI 40 MIN: CPT | Performed by: NURSE PRACTITIONER

## 2020-08-18 NOTE — PROGRESS NOTES
Chief Complaint   Patient presents with    Surgical Follow-up     4yrs s/p gastric bypass, down 87.5lbs gained 3lbs       Josias Ortiz is 4 years status post Malabsorptive gastric bypass for treatment of morbid obesity . Presents today for obesity management and weight gain. 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 Since surgery. Weight is up a little, yet she feels like she has regained 20 lbs and will \"get fat again\"   Not doing her usual protein bar since they stopped making it   Nausea  Rare   Regurgitation  No  BM most days   Vitamin compliance yes  Activity  None recently   Sleep   Good     Physical Exam  Visit Vitals  /75 (BP 1 Location: Left arm, BP Patient Position: Sitting)   Pulse 66   Temp 97.9 °F (36.6 °C) (Oral)   Resp 18   Ht 5' 3\" (1.6 m)   Wt 154 lb (69.9 kg)   LMP 01/29/2011   SpO2 98%   BMI 27.28 kg/m²     A + O x 3, looks well   Tearful   Chest  CTA, unlabored   COR  RRR  ABD Soft, NT/ND, no masses or hernias   EXT No edema; ambulating independently       ICD-10-CM ICD-9-CM    1. Postoperative intestinal malabsorption  K91.2 579.3    2.  BMI 27.0-27.9,adult  E94.21 V85.23        Josias Ortiz is 4 years  s/p Malabsorptive gastric bypass for treatment of morbid obesity   Struggling with compulsive eating   She will call her counselor, Kaitlin Renteria and make an appt   Start keeping journal  Clean the junk out of the house   Activity daily walking 10 minutes every hour   Sleep hygiene reviewed   Follow-up in 6 weeks virtual   Support group  Ese Day verbalized understanding and questions were answered to the best of my knowledge and ability. Diet, activity and mindfulness educational materials were provided. 44 minutes spent in face to face with Ese Day > 50% counseling.

## 2020-08-18 NOTE — PATIENT INSTRUCTIONS
Reach out to Marino Cochran and resume your counseling Clean out the pantry. Problem foods will be junk in the trash or junk on you. Start writing in your journal again Walk throughout the day and take the time to \"sit\" with your feelings Make a space for yourself where you can scrap book, move about, whatever without fear of disturbing your . Talk to him about an alternate workspace for him (and get your home back). We have started a Zoom Support Group and it will be the 2nd Thursday of the Month from 6-7 pm  
The next one is 9/10/20 6-7 pm  
You can access the link at [email or web address] Go to the Calendar tab and click on the date and it should go right to the link  
 
[email or web address] has a weekly support group

## 2020-08-18 NOTE — PROGRESS NOTES
1. Have you been to the ER, urgent care clinic since your last visit? Hospitalized since your last visit? No    2. Have you seen or consulted any other health care providers outside of the [address] [place] [address] since your last visit? Include any pap smears or colon screening.  No

## 2020-09-29 ENCOUNTER — VIRTUAL VISIT (OUTPATIENT)
Dept: SURGERY | Age: 50
End: 2020-09-29
Payer: COMMERCIAL

## 2020-09-29 VITALS — HEIGHT: 63 IN | WEIGHT: 152 LBS | BODY MASS INDEX: 26.93 KG/M2

## 2020-09-29 DIAGNOSIS — K91.2 POSTOPERATIVE INTESTINAL MALABSORPTION: Primary | ICD-10-CM

## 2020-09-29 PROCEDURE — 99213 OFFICE O/P EST LOW 20 MIN: CPT | Performed by: NURSE PRACTITIONER

## 2020-09-29 NOTE — PROGRESS NOTES
1. Have you been to the ER, urgent care clinic since your last visit? Hospitalized since your last visit? No    2. Have you seen or consulted any other health care providers outside of the [address] [place] [address] since your last visit? Include any pap smears or colon screening.  No

## 2020-09-29 NOTE — PATIENT INSTRUCTIONS
Great job with practicing some self care and love! Proud of you and your \"persistence\"! Stay on your vitamins Continue with the focus on whole foods Daily walking and yoga practice We have started a Zoom Support Group and it will be the 2nd Thursday of the Month from 6-7 pm  
The next one is 10/8/20 6-7 pm  
You can access the link at [email or web address] Go to the Calendar tab and click on the date and it should go right to the link Virtual follow up with me December 1, 2020 at 9 am

## 2020-09-29 NOTE — PROGRESS NOTES
I was in the office while conducting this encounter. Consent:  She and/or her healthcare decision maker is aware that this patient-initiated Telehealth encounter is a billable service, with coverage as determined by her insurance carrier. She is aware that she may receive a bill and has provided verbal consent to proceed: Yes    This virtual visit was conducted via [employer]. Pursuant to the emergency declaration under the [facility], [facility] waiver authority and the [employer] and [employer] Act, this Virtual  Visit was conducted to reduce the patient's risk of exposure to COVID-19 and provide continuity of care for an established patient. Services were provided through a video synchronous discussion virtually to substitute for in-person clinic visit. Due to this being a TeleHealth evaluation, many elements of the physical examination are unable to be assessed. Total Time: minutes: 11-20 minutes. Chief Complaint   Patient presents with    Morbid Obesity     4 years s/p bypass Down  89.5lbs. Loss 2lbs. [phone]       Markus Castro is 4 years status post Malabsorptive gastric bypass for treatment of morbid obesity . Presents virtually today for obesity management. Patient has lost 89 lbs. Since surgery. Her weight is back down and she is feeling better.   She has been working with Lawernce Divine, [employer] and is meeting every 2 weeks   Walking every day and resumed her yoga practice   Eating more whole foods, less junk   Sleeping better   She has increased magnesium and Fish Oil per Dr. Brittney Garza recommendations   Had EGD and colonoscopy about a month ago and was \"ok\"   Diarrhea is better and now with some constipation since she has not been eating all the junk foods   Nausea  Occasional \"sensitive stomach\"   Regurgitation  no  Vitamin compliance yes    Physical Exam  Visit Vitals  Ht 5' 3\" (1.6 m)   Wt 152 lb (68.9 kg)   LMP 01/29/2011   BMI 26.93 kg/m²     A + O x 3, looks well   Smiling today   Speech clear and breathing unlabored   ambulating independently       ICD-10-CM ICD-9-CM    1. Postoperative intestinal malabsorption  K91.2 579.3    2. BMI 26.0-26.9,adult  Z68.26 V85.22        Alena Gay is 4 years  s/p Malabsorptive gastric bypass for treatment of morbid obesity   She is back on track and practicing self care   Applauded her efforts and she will continue with behavioral health support   Daily walking, yoga   Focus on whole foods   Continue with vitamins and additional supplements (fish oil / magnesium)  Continue vitamins and protein supplements   Sleep hygiene reviewed   Follow-up in 2-3 months    Support group  Alena Gay verbalized understanding and questions were answered to the best of my knowledge and ability. Diet, activity and mindfulness educational materials were provided. Reviewed medication list, problem list and allergies. 16 minutes spent virtually with patient in directed conversation with the patient to include medical condition, assessment and plan.

## 2020-12-01 ENCOUNTER — VIRTUAL VISIT (OUTPATIENT)
Dept: SURGERY | Age: 50
End: 2020-12-01
Payer: COMMERCIAL

## 2020-12-01 VITALS — BODY MASS INDEX: 27.28 KG/M2 | WEIGHT: 154 LBS

## 2020-12-01 DIAGNOSIS — K91.2 POSTOPERATIVE INTESTINAL MALABSORPTION: Primary | ICD-10-CM

## 2020-12-01 PROCEDURE — 99212 OFFICE O/P EST SF 10 MIN: CPT | Performed by: NURSE PRACTITIONER

## 2020-12-01 NOTE — PROGRESS NOTES
I was in the office while conducting this encounter. Consent:  She and/or her healthcare decision maker is aware that this patient-initiated Telehealth encounter is a billable service, with coverage as determined by her insurance carrier. She is aware that she may receive a bill and has provided verbal consent to proceed: Yes    This virtual visit was conducted telephone encounter only. -  I affirm this is a Patient Initiated Episode with an Established Patient who has not had a related appointment within my department in the past 7 days or scheduled within the next 24 hours. Note: this encounter is not billable if this call serves to triage the patient into an appointment for the relevant concern. Total Time: minutes: 5-10 minutes. Chief Complaint   Patient presents with    Surgical Follow-up     4yrs s/p gastric bypass, down 87.5lbs, gained 2lbs       Judy Maddox is 4+ years status post Malabsorptive gastric bypass for treatment of morbid obesity . Presents today for obesity management. Patient has lost 87+ lbs. Since surgery. Patient is satisfied with progress. Weight stable past 6+ months   She continues to work with [employer] on emotional eating and triggers   She was recently diagnosed with Mono and her liver enzymes were elevated   She is better now, but still with fatigue   Thyroid levels were also off and her medication has been adjusted   Recent labs with PCP   Eating clean and trying to leave the junk alone   Bowels moving daily. Constipated  no  Nausea  rare  Regurgitation  Rare   Vitamin compliance yes  Activity  Stretching, yoga and meditation   Sleep   Good   Physical Exam  Visit Vitals  Wt 154 lb (69.9 kg)   LMP 01/29/2011   BMI 27.28 kg/m²           ICD-10-CM ICD-9-CM    1. Postoperative intestinal malabsorption  K91.2 579.3    2.  BMI 27.0-27.9,adult  L79.58 V85.23        Judy Maddox is 4+ years s/p Malabsorptive gastric bypass for treatment of morbid obesity  doing well   95% excess weight loss and weight is stable   Diet protein and produce \"clean eating\"   She will send over recent labs   Continue vitamins and protein supplements   Activity daily walking, yoga and meditation   Sleep hygiene reviewed   Follow-up in 6 months    Support group  Nam Almonte verbalized understanding and questions were answered to the best of my knowledge and ability. Diet, activity and mindfulness educational materials were provided. 10 minutes spent via telemedicine with Nam Almonte > 50% counseling.

## 2020-12-01 NOTE — PATIENT INSTRUCTIONS
Next appt Tuesday 6/1/21 at 9 am = virtual visit with ROMINA Painter Check out [email or web address] for on-line yoga classes and meditation Send me your recent labs. You can screen shot and upload to the portal.  Thanks! Zoom Support Group 2nd Thursday of each Month from 6-7 pm  
The next one is 12/10/20 6-7 pm  
You can access the link at [email or web address] Go to the Calendar tab and click on the date and it should go right to the link Additional Resources: 
Unjury:  [email or web address]  
? Offering online support groups and pre-recorded videos 
o Every Wednesday evening at 7:00 pm  
? Private Facebook page  Rebecca 39, Advice, and Motivation from fellow patients Bariatric Pal: [email or web address] 
BariatricPal has launched a podcast!  Hosted by [place], our podcast will cover topics about obesity, pre-weight loss surgery, post-weight loss surgery, food addiction, emotional eating, myths about weight loss surgery, and more. Each episode will feature an expert in the field of weight loss and bariatric surgery who can provide a deeper insight into these topics. [facility]:  [email or web address] 
? Offering discounted exercise programs (8 Week programs, On-Demand/Virtual) ? See flyer ? Contact Mariela Mcfadden at [email or web address] or [phone] Happy Holidays!

## 2020-12-01 NOTE — PROGRESS NOTES
1. Have you been to the ER, urgent care clinic since your last visit? Hospitalized since your last visit? No     2. Have you seen or consulted any other health care providers outside of the [address] [place] [address] since your last visit? Include any pap smears or colon screening.  no

## 2020-12-22 RX ORDER — FAMOTIDINE 40 MG/1
TABLET, FILM COATED ORAL
Qty: 60 TAB | Refills: 11 | Status: SHIPPED | OUTPATIENT
Start: 2020-12-22

## 2021-06-01 ENCOUNTER — VIRTUAL VISIT (OUTPATIENT)
Dept: SURGERY | Age: 51
End: 2021-06-01
Payer: COMMERCIAL

## 2021-06-01 VITALS — HEIGHT: 63 IN | WEIGHT: 155 LBS | BODY MASS INDEX: 27.46 KG/M2

## 2021-06-01 DIAGNOSIS — K91.2 POSTOPERATIVE INTESTINAL MALABSORPTION: Primary | ICD-10-CM

## 2021-06-01 DIAGNOSIS — R13.19 INTERMITTENT DYSPHAGIA: ICD-10-CM

## 2021-06-01 DIAGNOSIS — L30.4 INTERTRIGO: ICD-10-CM

## 2021-06-01 DIAGNOSIS — L98.7 EXCESS SKIN: ICD-10-CM

## 2021-06-01 PROCEDURE — 99213 OFFICE O/P EST LOW 20 MIN: CPT | Performed by: NURSE PRACTITIONER

## 2021-06-01 RX ORDER — NYSTATIN 100000 U/G
CREAM TOPICAL 2 TIMES DAILY
Qty: 30 G | Refills: 3 | Status: SHIPPED | OUTPATIENT
Start: 2021-06-01

## 2021-06-01 RX ORDER — GALCANEZUMAB 120 MG/ML
INJECTION, SOLUTION SUBCUTANEOUS
COMMUNITY
Start: 2021-05-21

## 2021-06-01 NOTE — PROGRESS NOTES
1. Have you been to the ER, urgent care clinic since your last visit? Hospitalized since your last visit? no    2. Have you seen or consulted any other health care providers outside of the [address] [place] [address] since your last visit? Include any pap smears or colon screening. no    States she had mono last October.

## 2021-06-07 NOTE — PATIENT INSTRUCTIONS
Your next appointment is 12/7/21 at 8 AM this is a virtual visit. If you do need to reschedule these give us a call at [phone]. For your skin folds, rashes and irritation keep clean and dry. You can apply the antifungal (nystatin) cream up to twice a day as needed. Continue your vitamins every day Be very cautious with alcohol as alcohol is metabolized very quickly after gastric bypass. Alcohol is also well sugar and can contribute to weight gain, dumping syndrome and just a general feeling of unwell. Keys to long term weight loss / management -  Sleep is critical and aim for 7 hours / night  
 
-  Drink mostly water and lots of it  
 
-  Eat \"clean\" and avoid processed foods (ie eating out of a box or bag) -  Move every day = walk, swim, bike, yard work, etc  
 
-  Journal what you eat (this is a proven tool to keep us on track)

## 2021-06-07 NOTE — PROGRESS NOTES
I was in the office while conducting this encounter. Consent:  She and/or her healthcare decision maker is aware that this patient-initiated Telehealth encounter is a billable service, with coverage as determined by her insurance carrier. She is aware that she may receive a bill and has provided verbal consent to proceed: Yes    This virtual visit was conducted via [employer]. Pursuant to the emergency declaration under the [facility], [facility] waiver authority and the [employer] Act, this Virtual  Visit was conducted to reduce the patient's risk of exposure to COVID-19 and provide continuity of care for an established patient. Services were provided through a video synchronous discussion virtually to substitute for in-person clinic visit. Due to this being a TeleHealth evaluation, many elements of the physical examination are unable to be assessed. Total Time: minutes: 11-20 minutes. Chief Complaint   Patient presents with    Surgical Follow-up     5 years s/p lap gastric bypass, down 87.5 lbs,gained 1 lb, [phone]    Morbid Obesity    Weight Loss       Eden Barillas 5-year status post laparoscopic gastric bypass for treatment of morbid obesity. She presents today for obesity management. Overall she is doing well. Her weight has been stable for several months. She still has occasional regurgitation/dysphagia and is typically associated with overeating. She says she works hard to keep things \"in check\". If she does have a bad day with regard to eating, referring to regurgitating she will resort back to her protein shakes or a protein bar. No hematemesis  Denies NSAIDs and tobacco  She does drink alcohol usually on the weekends and is typically 1 or 2 drinks sometimes more.   She is had some recent thyroid adjustments  She is complaining of yeast/itching/irritation at the bellybutton and under the abdominal apron.  She has been using some antibacterial cream but it has not been helping. She also reports occasional left-sided upper abdominal pain but it is nothing consistent and it comes and goes just as quickly  She is taking all of her bariatric vitamins and \"is faithful with that\"  She is doing walking and kayaking for activity  She has recently had another [facility]        Co-Morbid(s)     Was anti coagulation initiated for presumed / confirmed DVT/PE? NO    Was an incisional hernia noted on exam?       NO      COMORBIDITY     SLEEP APNEA                 NO        GERD  (req.meds)           NO  HYPERLIPIDEMIA           NO  HYPERTENSION             NO       IF YES, # OF HTN MEDICATIONS 0  DIABETES                        NO      IF YES, 0 NON-INSULIN   0 INSULIN       Visit Vitals  Ht 5' 3\" (1.6 m)   Wt 155 lb (70.3 kg)   LMP 01/29/2011   BMI 27.46 kg/m²     Appears well  Speech is clear breathing is unlabored  Mucous membranes appear moist      ICD-10-CM ICD-9-CM    1. Postoperative intestinal malabsorption  K91.2 579.3    2. BMI 27.0-27.9,adult  Z68.27 V85.23    3. Intertrigo  L30.4 695.89 nystatin (MYCOSTATIN) topical cream   4. Excess skin  L98.7 701.9 nystatin (MYCOSTATIN) topical cream   5. Intermittent dysphagia  R13.19 787.29      5-year status post laparoscopic gastric bypass for treatment of morbid obesity  98% excess weight loss  Cautioned about alcohol use and it should be kept to a minimum and an rare occasion  Avoid NSAIDs, tobacco  Continue with bariatric vitamins daily  Dietitian is available  Reviewed protein options and getting back to tracking  For skin reviewed skin care and hygiene will prescribe nystatin cream to use twice daily as needed in skin folds and umbilicus  For constipation can continue MiraLAX and Bene fiber as needed  Follow-up in 6 months  Betsy Rodríguez verbalized understanding and questions were answered to the best of my knowledge and ability.     Skin care, diet and activity educational materials were provided.

## 2021-10-12 NOTE — TELEPHONE ENCOUNTER
- Begin Propranolol 5mg in morning and 10mg at night for anxiety and nightmares.   - Continue other medications.  - Schedule a follow-up appointment in one month or sooner if you have any questions/concerns.     Spoke with patient & she confirmed that the 150mcg of synthroid is correct for now. Patient states that she has an appointment scheduled with an alternate PCP in February & she will have her thyroid levels rechecked at that time. RN informed patient about PCP being out of the office on medical leave for an undetermined amount of time. RN encouraged patient to reach out to office if there are any follow-up questions or concerns.

## 2021-12-07 ENCOUNTER — VIRTUAL VISIT (OUTPATIENT)
Dept: SURGERY | Age: 51
End: 2021-12-07
Payer: COMMERCIAL

## 2021-12-07 VITALS — WEIGHT: 161.4 LBS | HEIGHT: 63 IN | BODY MASS INDEX: 28.6 KG/M2

## 2021-12-07 DIAGNOSIS — E61.1 IRON DEFICIENCY: ICD-10-CM

## 2021-12-07 DIAGNOSIS — K91.2 POSTOPERATIVE INTESTINAL MALABSORPTION: Primary | ICD-10-CM

## 2021-12-07 DIAGNOSIS — E53.8 B12 DEFICIENCY: ICD-10-CM

## 2021-12-07 DIAGNOSIS — R63.5 WEIGHT GAIN: ICD-10-CM

## 2021-12-07 DIAGNOSIS — E55.9 VITAMIN D DEFICIENCY: ICD-10-CM

## 2021-12-07 PROCEDURE — 99213 OFFICE O/P EST LOW 20 MIN: CPT | Performed by: NURSE PRACTITIONER

## 2021-12-07 RX ORDER — CELECOXIB 200 MG/1
CAPSULE ORAL
COMMUNITY
Start: 2021-09-17

## 2021-12-07 NOTE — PROGRESS NOTES
1. Have you been to the ER, urgent care clinic since your last visit? Hospitalized since your last visit? No    2. Have you seen or consulted any other health care providers outside of the [address] [place] [address] since your last visit? Include any pap smears or colon screening.   Yes, Dr. Titi Alvarenga, PCP

## 2021-12-07 NOTE — PATIENT INSTRUCTIONS
Back to Basics Class with Shelba Sever, RD in January. She will send you the link to join in on the class. Start measuring your portions and keep them < 1 cup per meal     Drink mostly water and lots of it    Use caution with taking the Celebrex. It can still cause some GI upset and ulcer problems. If you develop more reflux, vomiting (more than your norm), upset stomach please stop the Celebrex. I placed a lab order to recheck your labs / vitamins. You can go to any [address] [place], [place] [address]vd do not need a paper order and [employer] can access the request in the system. For locations, visit [email or web address]         Your next appointment is 6/7/2022 at 8:20 AM.  This is a virtual visit. If you do need to reschedule please call [phone]. Move as much as you can     Keep the sugar out of the house    Stay on your vitamins every day     Protein 1st every meal     Happy holidays!

## 2021-12-07 NOTE — PROGRESS NOTES
I was in the office while conducting this encounter. Consent:  She and/or her healthcare decision maker is aware that this patient-initiated Telehealth encounter is a billable service, with coverage as determined by her insurance carrier. She is aware that she may receive a bill and has provided verbal consent to proceed: Yes    This virtual visit was conducted via [employer]. Pursuant to the emergency declaration under the [facility] [place] [address] waiver authority and the [employer] Act, this Virtual  Visit was conducted to reduce the patient's risk of exposure to COVID-19 and provide continuity of care for an established patient. Services were provided through a video synchronous discussion virtually to substitute for in-person clinic visit. Due to this being a TeleHealth evaluation, many elements of the physical examination are unable to be assessed. Total Time: minutes: 11-20 minutes. Chief Complaint   Patient presents with    Morbid Obesity     5.5 yrs s/p bpass. Down 87.5lbs. Gained 6.4lbs. [phone]    Weight Loss       Mello Li more than 5-year status post laparoscopic gastric bypass for treatment of morbid obesity. She is lost about 87 pounds. Her weight has been stable since 2017. She continues to struggle with arthritis problems. She said she has terrible knees and is trying to put off knee replacement surgery. She is got some injections which have been helpful. She is due to have shoulder surgery for tendon repair and a rotator cuff on the right shoulder. She has been started on Celebrex and is asking if that is okay. She said her orthopedist told her it was \"safe\" in light of gastric bypass. She still has intermittent dysphagia and does not do well with starchy food such as breads, pasta and rice.   She said she is not hungry but also has no sense of when she is full and tilts \"too late\". Last night she tried eating small salad and macaroni and cheese and after few bites of the mac & cheese she threw up. This is not unusual.  She has been trying to avoid sugar as she does experience dumping syndrome and has to remind herself \"to just leave alone\". She is not been measuring her meals  She denies hematemesis  Bowels are regular no black or bloody stools  She is compliant with bariatric vitamins and faithful with taking them daily  She is as active as she can be  Labs were done in April 2021 with her primary care provider. She reported that CBC and metabolic panel normal.  She did have mild elevation in her LFTs, but they seem to be improving and trending down. Reports her primary care is been following since she had mono last year. Visit Vitals  Ht 5' 3\" (1.6 m)   Wt 161 lb 6.4 oz (73.2 kg)   LMP 01/29/2011   BMI 28.59 kg/m²     Appears well  Speech is clear breathing is unlabored  Mucous membranes appear moist      ICD-10-CM ICD-9-CM    1. Postoperative intestinal malabsorption  K91.2 579.3    2. Weight gain  R63.5 783.1    3. BMI 28.0-28.9,adult  Z68.28 V85.24      5-1/2 years status post laparoscopic gastric bypass for treatment of morbid obesity  Greater than 90% excess weight loss and weight is stable  Reviewed vitamins and I will mail her a lab order to assess vitamins and nutrition to get done over the next 6 months  Continue with bariatric vitamins daily  Back to basics class with the dietitian  Cautioned about the Celebrex and did explained that it was not benign and still can cause some GI upset/ulcers. Discussed benefit/risk of taking the medication. Continue with acid suppression daily  Plan on follow-up 6/7/2022 at 8:20 AM.  Virtual visit. Bell Cooley verbalized understanding and questions were answered to the best of my knowledge and ability. Diet and activity  educational materials were provided.

## 2022-02-02 ENCOUNTER — TRANSCRIBE ORDER (OUTPATIENT)
Dept: SCHEDULING | Age: 52
End: 2022-02-02

## 2022-02-02 DIAGNOSIS — G57.61 NEUROMA OF SECOND INTERSPACE OF RIGHT FOOT: Primary | ICD-10-CM

## 2022-02-08 ENCOUNTER — HOSPITAL ENCOUNTER (OUTPATIENT)
Dept: MRI IMAGING | Age: 52
Discharge: HOME | End: 2022-02-08
Attending: PODIATRIST
Payer: COMMERCIAL

## 2022-02-08 VITALS — WEIGHT: 161 LBS | BODY MASS INDEX: 28.52 KG/M2

## 2022-02-08 DIAGNOSIS — G57.61 NEUROMA OF SECOND INTERSPACE OF RIGHT FOOT: ICD-10-CM

## 2022-02-08 PROCEDURE — 74011250636 HC RX REV CODE- 250/636: Performed by: RADIOLOGY

## 2022-02-08 PROCEDURE — 73720 MRI LWR EXTREMITY W/O&W/DYE: CPT

## 2022-02-08 PROCEDURE — A9575 INJ GADOTERATE MEGLUMI 0.1ML: HCPCS | Performed by: RADIOLOGY

## 2022-02-08 RX ORDER — GADOTERATE MEGLUMINE 376.9 MG/ML
14 INJECTION INTRAVENOUS
Status: COMPLETED | OUTPATIENT
Start: 2022-02-08 | End: 2022-02-08

## 2022-02-08 RX ADMIN — GADOTERATE MEGLUMINE 14 ML: 376.9 INJECTION INTRAVENOUS at 15:39

## 2022-02-22 ENCOUNTER — OFFICE VISIT (OUTPATIENT)
Dept: SURGERY | Age: 52
End: 2022-02-22

## 2022-02-22 DIAGNOSIS — E66.3 OVERWEIGHT (BMI 25.0-29.9): Primary | ICD-10-CM

## 2022-03-09 NOTE — PROGRESS NOTES
[address] [place] [address] at [facility]  Bariatric Surgery Program         Patient's Name: Nicolasa Giraldo  : 1970    This patient attended a 1 hour virtual Back to Basics nutrition class as part of the [address] [place] [address] Bariatric Surgery program at [facility]. This class is part of the post-op nutrition program for weight loss surgery patients. We reviewed key nutrition guidelines for bariatric surgery including portion sizes, protein, fluids, vitamins, exercise and behavior modification.       Sade Jesus RD

## 2022-03-18 PROBLEM — M18.9 OSTEOARTHRITIS OF CARPOMETACARPAL (CMC) JOINT OF THUMB: Status: ACTIVE | Noted: 2017-07-17

## 2022-03-22 ENCOUNTER — OFFICE VISIT (OUTPATIENT)
Dept: SURGERY | Age: 52
End: 2022-03-22

## 2022-03-22 DIAGNOSIS — E66.3 OVERWEIGHT (BMI 25.0-29.9): Primary | ICD-10-CM

## 2022-03-22 NOTE — PROGRESS NOTES
[facility] at [facility]  Bariatric Surgery Program         Patient's Name: Nicolasa Giraldo  : 1970    This patient attended a 1 hour virtual Back to Basics nutrition class as part of the [facility] Bariatric Surgery program at [facility]. This class is part of the post-op nutrition program for weight loss surgery patients. We reviewed key nutrition guidelines for bariatric surgery including portion sizes, protein, fluids, vitamins, exercise and behavior modification.       Sade Jesus RD

## 2022-06-07 ENCOUNTER — VIRTUAL VISIT (OUTPATIENT)
Dept: SURGERY | Age: 52
End: 2022-06-07
Payer: COMMERCIAL

## 2022-06-07 VITALS — WEIGHT: 167.5 LBS | HEIGHT: 63 IN | BODY MASS INDEX: 29.68 KG/M2

## 2022-06-07 DIAGNOSIS — R63.5 WEIGHT GAIN: ICD-10-CM

## 2022-06-07 DIAGNOSIS — N30.00 ACUTE CYSTITIS WITHOUT HEMATURIA: ICD-10-CM

## 2022-06-07 DIAGNOSIS — K91.2 POSTOPERATIVE INTESTINAL MALABSORPTION: Primary | ICD-10-CM

## 2022-06-07 PROCEDURE — 99213 OFFICE O/P EST LOW 20 MIN: CPT | Performed by: NURSE PRACTITIONER

## 2022-06-07 RX ORDER — RIMEGEPANT SULFATE 75 MG/75MG
TABLET, ORALLY DISINTEGRATING ORAL
COMMUNITY
Start: 2022-06-06

## 2022-06-07 RX ORDER — BUTALBITAL, ACETAMINOPHEN AND CAFFEINE 50; 325; 40 MG/1; MG/1; MG/1
TABLET ORAL
COMMUNITY
Start: 2022-04-25

## 2022-06-07 RX ORDER — NITROFURANTOIN 25; 75 MG/1; MG/1
100 CAPSULE ORAL 2 TIMES DAILY
Qty: 20 CAPSULE | Refills: 0 | Status: SHIPPED | OUTPATIENT
Start: 2022-06-07

## 2022-06-07 RX ORDER — FLUCONAZOLE 150 MG/1
150 TABLET ORAL DAILY
Qty: 1 TABLET | Refills: 0 | Status: SHIPPED | OUTPATIENT
Start: 2022-06-07 | End: 2022-06-08

## 2022-06-07 NOTE — PROGRESS NOTES
1. Have you been to the ER, urgent care clinic since your last visit? Hospitalized since your last visit? No    2. Have you seen or consulted any other health care providers outside of the [address] [place] [address] since your last visit? Include any pap smears or colon screening.  No

## 2022-06-10 NOTE — PROGRESS NOTES
I was in the office while conducting this encounter. Consent:  He and/or his healthcare decision maker is aware that this patient-initiated Telehealth encounter is a billable service, with coverage as determined by his insurance carrier. He is aware that he may receive a bill and has provided verbal consent to proceed: Yes    This virtual visit was conducted via [employer]. Pursuant to the emergency declaration under the [address] [place] [address], [address] [place] [address] authority and the [employer] Act, this Virtual  Visit was conducted to reduce the patient's risk of exposure to COVID-19 and provide continuity of care for an established patient. Services were provided through a video synchronous discussion virtually to substitute for in-person clinic visit. Due to this being a TeleHealth evaluation, many elements of the physical examination are unable to be assessed. Total Time: minutes: 11-20 minutes. Vahe Meredith, was evaluated through a synchronous (real-time) audio-video encounter. The patient (or guardian if applicable) is aware that this is a billable service, which includes applicable co-pays. This Virtual Visit was conducted with patient's (and/or legal guardian's) consent. The visit was conducted pursuant to the emergency declaration under the [address] [place] [address], [address] [place] [address] authority and the [employer] and [employer] General Act. Patient identification was verified, and a caregiver was present when appropriate. The patient was located in a state where the provider was licensed to provide care. Chief Complaint   Patient presents with    Surgical Follow-up     6 years s/p lap gastric bypass down 81.5 pound, gained 6 pounds [email or web address]       Baltyrese Keenejordy 6 years s/p Gastric bypass for treatment of morbid obesity.   Presents for obesity management and weight gain. Pre op weight 254 lbs   Gamaliel   158 lbs   Current   167 lbs     Fearful of gaining weight back and continues to work with counselor on emotional eating   Exercise has been limited with shoulder issues - does some yoga, pool and walking   Taking Celebrex with Pepcid for pain   Taking all vitamins and will send labs   Did back to basics class x 2 with Tapan Sinha urinary urgency and burning since returning from the beach   \"I think I have a UTI, can you give me something? \"  Moved back to home and no longer in the [place] \"too expensive with gas prices and got too small\"     Visit Vitals   5' 3\" (1.6 m)   Wt 167 lb 8 oz (76 kg)   LMP 01/29/2011   BMI 29.67 kg/m²     Looks well   Speech clear and breathing unlabored     ICD-10-CM ICD-9-CM    1. Postoperative intestinal malabsorption  K91.2 579.3    2. Acute cystitis without hematuria  N30.00 595.0 nitrofurantoin, macrocrystal-monohydrate, (Macrobid) 100 mg capsule   3. BMI 29.0-29.9,adult  Z68.29 V85.25    4. Weight gain  R63.5 783.1      6 years s/p Gastric bypass for treatment of morbid obesity   > 90% excess weight loss   Continue vitamins and diet   Continue with behavioral health support   RD available on individual basis to help with accountability   Daily activity   Follow up 12/6/22 at 8 am virtual  Send labs for review   Tio Phillip verbalized understanding and questions were answered to the best of my knowledge and ability. Diet and activity  educational materials were provided.

## 2022-06-10 NOTE — PATIENT INSTRUCTIONS
If you would like to make an individual appointment with one of the bariatric dietitians, please call the bariatric line at [phone]. Appointments are offered in person and virtual at no charge.     Next appointment 12/6/22 at 8 am virtual visit       Keys to long term weight loss / management     -  Sleep is critical and aim for 7 hours / night     -  Drink mostly water and lots of it     -  Eat \"clean\" and avoid processed foods (ie eating out of a box or bag)     -  Move every day = walk, swim, bike, yard work, etc     -  Journal what you eat (this is a proven tool to keep us on track)

## 2022-12-06 ENCOUNTER — VIRTUAL VISIT (OUTPATIENT)
Dept: SURGERY | Age: 52
End: 2022-12-06
Payer: COMMERCIAL

## 2022-12-06 VITALS — HEIGHT: 63 IN | BODY MASS INDEX: 29.41 KG/M2 | WEIGHT: 166 LBS

## 2022-12-06 DIAGNOSIS — K91.2 POSTOPERATIVE INTESTINAL MALABSORPTION: Primary | ICD-10-CM

## 2022-12-06 DIAGNOSIS — L30.4 INTERTRIGO: ICD-10-CM

## 2022-12-06 PROCEDURE — 99213 OFFICE O/P EST LOW 20 MIN: CPT | Performed by: NURSE PRACTITIONER

## 2022-12-06 RX ORDER — LEVOTHYROXINE SODIUM 150 UG/1
150 TABLET ORAL
COMMUNITY

## 2022-12-06 NOTE — PROGRESS NOTES
Identified pt with two pt identifiers (name and ). Reviewed chart in preparation for visit and have obtained necessary documentation. Rod Newell is a 47 y.o. female  Chief Complaint   Patient presents with    Surgical Follow-up     6 years 5 months s/p lap gastric bypass down 82.5 pounds, lost 1 pound #[phone]     Visit Vitals  Ht 5' 2.5\" (1.588 m)   Wt 166 lb (75.3 kg)   LMP 2011   BMI 29.88 kg/m²       1. Have you been to the ER, urgent care clinic since your last visit? Hospitalized since your last visit? No    2. Have you seen or consulted any other health care providers outside of the [address] [place] [address] since your last visit? Include any pap smears or colon screening.  No

## 2022-12-06 NOTE — Clinical Note
Please add appointment 6/6/2023 at 8 AM.  Virtual visit.   Established patient RNY 7 years thanks-LESLIE

## 2022-12-06 NOTE — PATIENT INSTRUCTIONS
Your next appointment is 6/6/2023 at 8 AM.  This is a virtual visit. If you do need to reschedule please call [phone]. Yonatan Martínez!      Keys to long term weight loss / management     -  Sleep is critical and aim for 7 hours / night     -  Drink mostly water and lots of it     -  Eat \"clean\" and avoid processed foods (ie eating out of a box or bag)     -  Move every day = walk, swim, bike, yard work, etc     -  Journal what you eat (this is a proven tool to keep us on track)

## 2022-12-06 NOTE — PROGRESS NOTES
I was in the office while conducting this encounter. Consent:  He and/or his healthcare decision maker is aware that this patient-initiated Telehealth encounter is a billable service, with coverage as determined by his insurance carrier. He is aware that he may receive a bill and has provided verbal consent to proceed: Yes    This virtual visit was conducted via [employer]. Pursuant to the emergency declaration under the [address] [place] [address], [facility] authority and the [employer] Act, this Virtual  Visit was conducted to reduce the patient's risk of exposure to COVID-19 and provide continuity of care for an established patient. Services were provided through a video synchronous discussion virtually to substitute for in-person clinic visit. Due to this being a TeleHealth evaluation, many elements of the physical examination are unable to be assessed. Total Time: minutes: 11-20 minutes. Gissel Mckeon, was evaluated through a synchronous (real-time) audio-video encounter. The patient (or guardian if applicable) is aware that this is a billable service, which includes applicable co-pays. This Virtual Visit was conducted with patient's (and/or legal guardian's) consent. The visit was conducted pursuant to the emergency declaration under the [address] [place] [address], [address] [place] [address] [facility] authority and the [employer] Act. Patient identification was verified, and a caregiver was present when appropriate. The patient was located in a state where the provider was licensed to provide care. Chief Complaint   Patient presents with    Surgical Follow-up     6 years 5 months s/p lap gastric bypass down 82.5 pounds, lost 1 pound #[phone]       Gissel Mckeon 6 and half years status post laparoscopic gastric bypass for treatment of morbid obesity.   She is doing well.  She is down more than 80 pounds. Her weight is stable. She is recently returned from [place] and said they did a lot of walking and she had a great time. Still with some intermittent dysphagia but she is pretty much at her baseline. She is walking frequently for activity. She does use protein bars for additional protein. She continues to work with Dr. Mary Palm for mental health support and her emotional eating  She has no abdominal pain  Bowels are fairly regular with some occasional constipation  She is taking all of her bariatric vitamin supplements and had labs within the last 6 months  She did the back to basics class with Ewelina Horne and found it very helpful  Visit Vitals   5' 2.5\" (1.588 m)   Wt 166 lb (75.3 kg)   LMP 01/29/2011   BMI 29.88 kg/m²     Appears well  Speech is clear breathing is unlabored  Mucous membranes appear moist      ICD-10-CM ICD-9-CM    1. Postoperative intestinal malabsorption  K91.2 579.3       2. BMI 29.0-29.9,adult  Z68.29 V85.25       3. Intertrigo  L30.4 695.89         6 and half year status post laparoscopic gastric bypass for treatment of morbid obesity  More than 90% excess weight loss  Reminded to avoid NSAIDs  Continue with bariatric vitamin supplements  Continue with protein supplementation  Continue with behavioral health support  Dietitian is available  Support group  Follow-up 6/6/2023 at 8 AM virtual visit  Conrado Leroy verbalized understanding and questions were answered to the best of my knowledge and ability. Diet and activity  educational materials were provided.

## 2023-05-23 RX ORDER — TRAMADOL HYDROCHLORIDE 50 MG/1
TABLET ORAL
COMMUNITY
Start: 2020-02-20

## 2023-05-23 RX ORDER — PREGABALIN 150 MG/1
1 CAPSULE ORAL 2 TIMES DAILY
COMMUNITY
Start: 2020-02-21

## 2023-05-23 RX ORDER — WHEAT DEXTRIN 3 G/3.8 G
POWDER (GRAM) ORAL DAILY
COMMUNITY

## 2023-05-23 RX ORDER — ESCITALOPRAM OXALATE 20 MG/1
1 TABLET ORAL DAILY
COMMUNITY
Start: 2018-12-18

## 2023-05-23 RX ORDER — BUTALBITAL, ACETAMINOPHEN AND CAFFEINE 50; 325; 40 MG/1; MG/1; MG/1
TABLET ORAL
COMMUNITY
Start: 2022-04-25

## 2023-05-23 RX ORDER — POLYETHYLENE GLYCOL 3350 17 G/17G
17 POWDER, FOR SOLUTION ORAL DAILY
COMMUNITY

## 2023-05-23 RX ORDER — FLUTICASONE PROPIONATE 50 MCG
2 SPRAY, SUSPENSION (ML) NASAL
COMMUNITY

## 2023-05-23 RX ORDER — ALPRAZOLAM 0.5 MG/1
0.5 TABLET ORAL 3 TIMES DAILY PRN
COMMUNITY
Start: 2019-04-21

## 2023-05-23 RX ORDER — BUSPIRONE HYDROCHLORIDE 5 MG/1
5 TABLET ORAL PRN
COMMUNITY
Start: 2019-02-28

## 2023-05-23 RX ORDER — NYSTATIN 100000 U/G
CREAM TOPICAL 2 TIMES DAILY
COMMUNITY
Start: 2021-06-01

## 2023-05-23 RX ORDER — ONDANSETRON 4 MG/1
4 TABLET, ORALLY DISINTEGRATING ORAL EVERY 8 HOURS PRN
COMMUNITY
Start: 2020-02-25 | End: 2023-06-06 | Stop reason: SDUPTHER

## 2023-05-23 RX ORDER — GALCANEZUMAB 120 MG/ML
INJECTION, SOLUTION SUBCUTANEOUS
COMMUNITY
Start: 2021-05-21

## 2023-05-23 RX ORDER — ASCORBIC ACID 250 MG
500 TABLET ORAL 2 TIMES DAILY
COMMUNITY

## 2023-05-23 RX ORDER — CETIRIZINE HYDROCHLORIDE 10 MG/1
TABLET ORAL DAILY PRN
COMMUNITY

## 2023-05-23 RX ORDER — BACLOFEN 10 MG/1
1 TABLET ORAL 3 TIMES DAILY
COMMUNITY
Start: 2019-03-21

## 2023-05-23 RX ORDER — RIMEGEPANT SULFATE 75 MG/75MG
TABLET, ORALLY DISINTEGRATING ORAL
COMMUNITY
Start: 2022-06-06

## 2023-05-23 RX ORDER — TOPIRAMATE 50 MG/1
1 TABLET, FILM COATED ORAL 2 TIMES DAILY
COMMUNITY
Start: 2020-02-02

## 2023-05-23 RX ORDER — LANOLIN ALCOHOL/MO/W.PET/CERES
2 CREAM (GRAM) TOPICAL 2 TIMES DAILY WITH MEALS
COMMUNITY

## 2023-05-23 RX ORDER — BUPROPION HYDROCHLORIDE 100 MG/1
1 TABLET, EXTENDED RELEASE ORAL DAILY
COMMUNITY
Start: 2019-03-19

## 2023-05-23 RX ORDER — CELECOXIB 200 MG/1
CAPSULE ORAL
COMMUNITY
Start: 2021-09-17

## 2023-05-23 RX ORDER — LEVOTHYROXINE SODIUM 0.15 MG/1
150 TABLET ORAL
COMMUNITY

## 2023-05-23 RX ORDER — FAMOTIDINE 40 MG/1
1 TABLET, FILM COATED ORAL 2 TIMES DAILY
COMMUNITY
Start: 2020-12-22

## 2023-05-23 RX ORDER — HYOSCYAMINE SULFATE 0.12 MG/1
0.12 TABLET SUBLINGUAL EVERY 6 HOURS PRN
COMMUNITY
Start: 2020-02-25

## 2023-05-23 RX ORDER — AMPICILLIN TRIHYDRATE 250 MG
500 CAPSULE ORAL
COMMUNITY

## 2023-06-06 ENCOUNTER — TELEMEDICINE (OUTPATIENT)
Age: 53
End: 2023-06-06
Payer: COMMERCIAL

## 2023-06-06 VITALS — WEIGHT: 166 LBS | HEIGHT: 63 IN | BODY MASS INDEX: 29.41 KG/M2

## 2023-06-06 DIAGNOSIS — R94.6 ABNORMAL THYROID FUNCTION TEST: ICD-10-CM

## 2023-06-06 DIAGNOSIS — R19.5 LOOSE STOOLS: ICD-10-CM

## 2023-06-06 DIAGNOSIS — K91.2 POSTOPERATIVE INTESTINAL MALABSORPTION: Primary | ICD-10-CM

## 2023-06-06 DIAGNOSIS — R11.0 NAUSEA: ICD-10-CM

## 2023-06-06 PROCEDURE — 99213 OFFICE O/P EST LOW 20 MIN: CPT | Performed by: NURSE PRACTITIONER

## 2023-06-06 RX ORDER — LEMBOREXANT 10 MG/1
TABLET, FILM COATED ORAL
COMMUNITY
Start: 2023-03-08

## 2023-06-06 RX ORDER — ONDANSETRON 4 MG/1
4 TABLET, ORALLY DISINTEGRATING ORAL EVERY 8 HOURS PRN
Qty: 30 TABLET | Refills: 1 | Status: SHIPPED | OUTPATIENT
Start: 2023-06-06

## 2023-06-06 ASSESSMENT — PATIENT HEALTH QUESTIONNAIRE - PHQ9
SUM OF ALL RESPONSES TO PHQ QUESTIONS 1-9: 0
SUM OF ALL RESPONSES TO PHQ QUESTIONS 1-9: 0
2. FEELING DOWN, DEPRESSED OR HOPELESS: 0
SUM OF ALL RESPONSES TO PHQ QUESTIONS 1-9: 0
SUM OF ALL RESPONSES TO PHQ9 QUESTIONS 1 & 2: 0
SUM OF ALL RESPONSES TO PHQ QUESTIONS 1-9: 0
1. LITTLE INTEREST OR PLEASURE IN DOING THINGS: 0

## 2023-06-06 ASSESSMENT — PAIN SCALES - GENERAL: PAINLEVEL_OUTOF10: 8

## 2023-06-06 NOTE — PROGRESS NOTES
Identified patient with two patient identifiers (name and ). Reviewed chart in preparation for visit and have obtained necessary documentation. Charles Salazar is a 46 y.o. female  Chief Complaint   Patient presents with    Follow-up     7 years s/p lap gastric bypass     There were no vitals taken for this visit. 1. Have you been to the ER, urgent care clinic since your last visit? Hospitalized since your last visit? yes, had surgery and broke a bone    2. Have you seen or consulted any other health care providers outside of the [address] [place] [address] since your last visit? Include any pap smears or colon screening.  [place]
stools and if not better once euthyroid will re-evaluate and check pancreatic fecal elastase and send to GI (mom with Crohn's dz)   Can add digestive enzymes OTC with meals   Rec switching to [place] MVI 45 and continue calcium   Continue vitamins and protein supplements   Identified strategies to meet hydration and protein goals   Activity daily movement: walking, swimming, biking, etc and aim for 150 minutes weekly   Sleep hygiene reviewed   Follow-up with dietician back to basics class   Follow-up with provider in 12/7/23 @ 8:20am virtual   Cautioned with alcohol   Support group  Adri Breath verbalized understanding and questions were answered to the best of my knowledge and ability. Diet, activity and mindfulness educational materials were provided. 24 minutes spent virtually with Adri Breath > 50% counseling. Adri Breath, was evaluated through a synchronous (real-time) audio-video encounter. The patient (or guardian if applicable) is aware that this is a billable service, which includes applicable co-pays. This Virtual Visit was conducted with patient's (and/or legal guardian's) consent. Patient identification was verified, and a caregiver was present when appropriate. The patient was located at Home: [place] [address]  Provider was located at [facility] (Appt Dept): [facility]        Total time spent on this encounter:  24    --TOÑITO Correia NP on 6/6/2023 at 8:35 AM    An electronic signature was used to authenticate this note.

## 2023-06-09 NOTE — PATIENT INSTRUCTIONS
Please let me know if the diarrhea is not better once your thyroid levels off. Back to Basics Bariatric Nutrition. Virtual class with Ayush Cohen RD. Karan Smith will reach out to you via e-mail with details on date/time and Zoom link. Next class is 6/13/23 3-4 pm     If you would like to make an appointment with one of the bariatric dietitians, please call the bariatric line at [phone]. Appointments are offered in person and virtual at no charge. Keys to long term weight loss / management     -  Regular weight check and self monitoring. Weigh yourself at least weekly and if the scale is creeping up, reach out!    -  Sleep is critical and aim for 7 hours / night     -  Drink mostly water and lots of it     -  Eat whole foods and focus on lean proteins and fiber (like veggies and fruits). Limit/avoid processed foods (ie eating out of a box or bag)     -  Move every day = walk, swim, bike, yard work, etc     -  Journal what you eat (this is a proven tool to keep us on track) - use an shaniqua, like [employer]     Stay in follow up!

## 2023-10-11 ENCOUNTER — HOSPITAL ENCOUNTER (OUTPATIENT)
Facility: HOSPITAL | Age: 53
Discharge: HOME | End: 2023-10-14
Attending: STUDENT IN AN ORGANIZED HEALTH CARE EDUCATION/TRAINING PROGRAM
Payer: COMMERCIAL

## 2023-10-11 DIAGNOSIS — N63.0 MASS OF BREAST, UNSPECIFIED LATERALITY: ICD-10-CM

## 2023-10-11 PROCEDURE — G0279 TOMOSYNTHESIS, MAMMO: HCPCS

## 2023-10-11 PROCEDURE — 77066 DX MAMMO INCL CAD BI: CPT

## 2023-10-11 PROCEDURE — 76642 ULTRASOUND BREAST LIMITED: CPT

## 2023-12-07 ENCOUNTER — TELEMEDICINE (OUTPATIENT)
Age: 53
End: 2023-12-07
Payer: COMMERCIAL

## 2023-12-07 VITALS
WEIGHT: 164.4 LBS | SYSTOLIC BLOOD PRESSURE: 157 MMHG | HEIGHT: 62 IN | HEART RATE: 48 BPM | OXYGEN SATURATION: 98 % | DIASTOLIC BLOOD PRESSURE: 96 MMHG | BODY MASS INDEX: 30.25 KG/M2

## 2023-12-07 DIAGNOSIS — K91.2 POSTOPERATIVE INTESTINAL MALABSORPTION: Primary | ICD-10-CM

## 2023-12-07 DIAGNOSIS — E66.9 OBESITY (BMI 30.0-34.9): ICD-10-CM

## 2023-12-07 DIAGNOSIS — L30.4 INTERTRIGO: ICD-10-CM

## 2023-12-07 DIAGNOSIS — L98.7 EXCESS SKIN OF ABDOMEN: ICD-10-CM

## 2023-12-07 PROCEDURE — 99213 OFFICE O/P EST LOW 20 MIN: CPT | Performed by: NURSE PRACTITIONER

## 2023-12-07 RX ORDER — PANTOPRAZOLE SODIUM 20 MG/1
60 TABLET, DELAYED RELEASE ORAL DAILY
COMMUNITY

## 2023-12-07 RX ORDER — LISINOPRIL 20 MG/1
20 TABLET ORAL DAILY
COMMUNITY

## 2023-12-07 RX ORDER — LEVOTHYROXINE SODIUM 137 UG/1
137 TABLET ORAL DAILY
COMMUNITY

## 2023-12-07 ASSESSMENT — PATIENT HEALTH QUESTIONNAIRE - PHQ9
SUM OF ALL RESPONSES TO PHQ QUESTIONS 1-9: 0
2. FEELING DOWN, DEPRESSED OR HOPELESS: 0
SUM OF ALL RESPONSES TO PHQ QUESTIONS 1-9: 0
1. LITTLE INTEREST OR PLEASURE IN DOING THINGS: 0
SUM OF ALL RESPONSES TO PHQ QUESTIONS 1-9: 0
SUM OF ALL RESPONSES TO PHQ9 QUESTIONS 1 & 2: 0
SUM OF ALL RESPONSES TO PHQ QUESTIONS 1-9: 0

## 2023-12-11 RX ORDER — NYSTATIN 100000 U/G
CREAM TOPICAL
Qty: 30 G | Refills: 3 | Status: SHIPPED | OUTPATIENT
Start: 2023-12-11

## 2023-12-11 NOTE — PATIENT INSTRUCTIONS
Make an appointment with one of the bariatric dietitians, please call the bariatric line at [phone]. Appointments are offered in person and virtual at no charge. OK to add an 81 mg coated aspirin every other day     Stay on your vitamins daily     Final 2023 support group session this Thursday December 14th from 6:00 - 7:00 pm via [employer]. Please share with the information your patients and the 2023 schedule is attached. Topic: Preventing Weight Regain   Presenter: Dr. Pennie Owen  Facilitator: My Zhu     Zoom link to join: [email or web address]   OR  Dial In: [phone] and enter Meeting ID:

## 2023-12-11 NOTE — PROGRESS NOTES
Chief Complaint   Patient presents with    Follow-up     7.5  s/p lap gastric Bypass       Winston Arellano is 7.5 years status post Gastric bypass for treatment of morbid obesity . Presents today for obesity management. Past few months have been eventful. BP has been \"really high\", elevated LFTs and had a TIA    She has been following with her PCP closely about every 3 weeks  Monitoring BP at home   LFTs are improved and she is off all Tylenol and alcohol   Limited caffeine   Has been trying to follow a pescatarian diet   Weight is stable      Patient has lost 80+ lbs. Since surgery. \"I still get upset about my weight and I see the same therapist\"    Pain: migraine headaches     Protein intake: 60 grams of protein daily is her goal   Not much  appetite   Fluid intake: 64+ oz of fluids per day.   Types: Crystal Lite, hot tea with truvia     Bowels are \"all or none\" with intermittent diarrhea and constipation   Denies melena  Denies undigested pills or food in stools   No mucous or blood   No oily stools   No incontinence  Colonoscopy 2020 was ok   Stopped Imodium     Nausea  Prn and uses Zofran with relief   Regurgitation  Prn if overeats     Vitamin compliance _takes separate vitamins __ Bariatric MVI 45 mg iron  _yes__ Calcium Citrate 1200 -1500 mg daily in divided doses        Activity  Walking some   Sleep   ok    Asking if can take ASA (per PCP) and she is on Celebrex     Last Weight Metrics:      12/7/2023     8:00 AM 6/6/2023     7:45 AM 12/6/2022     7:00 AM 6/7/2022     7:00 AM 2/8/2022     3:02 PM 12/7/2021     7:00 AM 6/1/2021     7:00 AM   Weight Loss Metrics   Height 5' 2\" 5' 2.5\" 5' 2.5\" 5' 3\"  5' 3\" 5' 3\"   Weight - Scale 164 lbs 6 oz 166 lbs 166 lbs 167 lbs 8 oz 161 lbs 161 lbs 6 oz 155 lbs   BMI (Calculated) 30.1 kg/m2 29.9 kg/m2 29.9 kg/m2 29.7 kg/m2 0 kg/m2 28.7 kg/m2 27.5 kg/m2   Last Surgical Weight Loss:      12/7/2023     8:14 AM   Surgical Weight Loss Tracker   Consult Date 10/27/2015

## 2024-07-11 ENCOUNTER — TELEMEDICINE (OUTPATIENT)
Age: 54
End: 2024-07-11
Payer: COMMERCIAL

## 2024-07-11 VITALS — BODY MASS INDEX: 27.64 KG/M2 | WEIGHT: 150.2 LBS | HEIGHT: 62 IN

## 2024-07-11 DIAGNOSIS — R11.0 NAUSEA: ICD-10-CM

## 2024-07-11 DIAGNOSIS — K91.2 POSTOPERATIVE INTESTINAL MALABSORPTION: Primary | ICD-10-CM

## 2024-07-11 PROCEDURE — 99213 OFFICE O/P EST LOW 20 MIN: CPT | Performed by: NURSE PRACTITIONER

## 2024-07-11 RX ORDER — SEMAGLUTIDE 2.4 MG/.75ML
INJECTION, SOLUTION SUBCUTANEOUS
COMMUNITY
Start: 2024-06-25

## 2024-07-11 RX ORDER — ONDANSETRON 4 MG/1
4 TABLET, ORALLY DISINTEGRATING ORAL EVERY 8 HOURS PRN
Qty: 30 TABLET | Refills: 1 | Status: SHIPPED | OUTPATIENT
Start: 2024-07-11

## 2024-07-11 ASSESSMENT — PATIENT HEALTH QUESTIONNAIRE - PHQ9
SUM OF ALL RESPONSES TO PHQ QUESTIONS 1-9: 0
SUM OF ALL RESPONSES TO PHQ QUESTIONS 1-9: 0
2. FEELING DOWN, DEPRESSED OR HOPELESS: NOT AT ALL
1. LITTLE INTEREST OR PLEASURE IN DOING THINGS: NOT AT ALL
SUM OF ALL RESPONSES TO PHQ QUESTIONS 1-9: 0
SUM OF ALL RESPONSES TO PHQ QUESTIONS 1-9: 0
SUM OF ALL RESPONSES TO PHQ9 QUESTIONS 1 & 2: 0

## 2024-07-11 NOTE — PROGRESS NOTES
Identified patient with two patient identifiers (name and ). Reviewed chart in preparation for visit and have obtained necessary documentation.    Lina Mercedes is a 54 y.o. female  Chief Complaint   Patient presents with    Follow-up     8 YEARS S/P ROBOTIC GASTRIC BYPASS / EGD    Weight Management     OBESITY     Ht 1.575 m (5' 2\")   Wt 68.1 kg (150 lb 3.2 oz)   BMI 27.47 kg/m²     1. Have you been to the ER, urgent care clinic since your last visit?  Hospitalized since your last visit?no    2. Have you seen or consulted any other health care providers outside of the [facility] since your last visit?  Include any pap smears or colon screening. Yes Orthopedics

## 2024-07-12 NOTE — PATIENT INSTRUCTIONS
Non-Negotiable's:     HYDRATION: aim for 64+ oz of water or other sugar-free, non-carbonated drink     2.   PROTEIN: 60+ grams per day from \"real food\" (lean meats, low-fat dairy, eggs, legumes, fish/seafood, nuts and seeds (limited amounts). 20 grams at meals and 10-15 grams at a snack. The bulk of protein intake should come from food and not supplements (shakes, bars, protein \"chips\", etc).     3.   VITAMINS: take your Bariatric approved vitamins every day     4.   ACTIVITY: moving your body is whiteside in supporting a healthier lifestyle. Walk, bike, swim, dance, strength train, etc...move your body daily throughout the day    5.   SLEEP: sleep is critical for your health and wellbeing. Aim for 7 hours per night. Get into a healthy sleep routine and put the screens away (phone, TV, ipad, computer, etc). Avoid screen time a few hours before bed to help fall asleep. Try reading, listening to a book, podcast, or music, meditation, prayer, and or a hot bath before bed. Your room should be calming, quiet, dark, and cool to promote a good night's sleep.        Avoid eating in your bedroom or at your desk. All food should be plated, eaten at the table, and pay attention = mindful eating.

## 2024-07-12 NOTE — PROGRESS NOTES
Chief Complaint   Patient presents with    Follow-up     8 YEARS S/P ROBOTIC GASTRIC BYPASS / EGD    Weight Management     OBESITY       HPI: Lina Mercedes presents today for obesity management.  She doing well.  She has been on Wegovy, prescribed by her primary care provider for several months with excellent response.  She says she is tolerating very well and is currently on the 2.4 mg dose.  \"I still have issues with my stomach but nothing new\".  Has some occasional nausea and she is requesting a refill on Zofran.  Her goal weight is 135 pounds \"be out of the obese and overweight category\".    She had recent labs with her primary care provider and all her \"numbers were good\"  She has an appointment with liver specialist 7/25/2024    8 years status post: [x] Gastric bypass for treatment of morbid obesity   [] Sleeve gastrectomy for treatment of morbid obesity   [] Conversion sleeve to gastric bypass   [] Conversion lap band to gastric bypass   [] SAD-I  [] DS  [] Revision      Presents today for [x] Obesity management   [] Weight regain   [] Abdominal pain   [] Medication management   []     Pain:  [x] Yes, Location generalized aches and pains   [] No    Yes Meeting protein goals (60 grams minimum per day)   [] Shakes [] Bars [x] Other good food tolerance and focusing on proteins at meals and has a protein bar  Yes Meeting hydration goals (64 oz minimum daily) [x] Water/coffee [] Juices [x] Sugar-free add-ins [] Electrolyte drink/mix   No Tracking intake      Bowels moving  [] Most days   [] Few times per week   [x] intermittent diarrhea and constipation (this is not new)  Nausea   Yes PRN relief with Zofran  Regurgitation   Yes rare    Vitamin compliance  Yes      [] Bariatric MVI 45 mg iron  [x] Other vitamin regimen   [x] Calcium Citrate 1200 -1500 mg daily in divided doses   [] B12 injections 1000 mcg/ml   [] Prescription D2 50,000 iu  [] Weekly       [] Twice weekly       [] Three times weekly       [] Every

## 2024-07-25 ENCOUNTER — OFFICE VISIT (OUTPATIENT)
Age: 54
End: 2024-07-25
Payer: COMMERCIAL

## 2024-07-25 VITALS
DIASTOLIC BLOOD PRESSURE: 68 MMHG | WEIGHT: 149.6 LBS | OXYGEN SATURATION: 95 % | HEART RATE: 71 BPM | SYSTOLIC BLOOD PRESSURE: 104 MMHG | BODY MASS INDEX: 27.53 KG/M2 | TEMPERATURE: 97.3 F | HEIGHT: 62 IN

## 2024-07-25 DIAGNOSIS — R74.8 ELEVATED LIVER ENZYMES: Primary | ICD-10-CM

## 2024-07-25 PROBLEM — Z86.69 HISTORY OF MIGRAINE HEADACHES: Status: ACTIVE | Noted: 2024-07-25

## 2024-07-25 PROCEDURE — 91200 LIVER ELASTOGRAPHY: CPT | Performed by: NURSE PRACTITIONER

## 2024-07-25 PROCEDURE — 99204 OFFICE O/P NEW MOD 45 MIN: CPT | Performed by: NURSE PRACTITIONER

## 2024-07-25 RX ORDER — LEVOTHYROXINE SODIUM 137 UG/1
137 TABLET ORAL DAILY
COMMUNITY

## 2024-07-25 RX ORDER — LISINOPRIL 40 MG/1
40 TABLET ORAL DAILY
COMMUNITY

## 2024-07-25 ASSESSMENT — ANXIETY QUESTIONNAIRES

## 2024-07-25 ASSESSMENT — PATIENT HEALTH QUESTIONNAIRE - PHQ9
DEPRESSION UNABLE TO ASSESS: FUNCTIONAL CAPACITY MOTIVATION LIMITS ACCURACY
5. POOR APPETITE OR OVEREATING: NOT AT ALL
SUM OF ALL RESPONSES TO PHQ QUESTIONS 1-9: 0
2. FEELING DOWN, DEPRESSED OR HOPELESS: NOT AT ALL
4. FEELING TIRED OR HAVING LITTLE ENERGY: NOT AT ALL
9. THOUGHTS THAT YOU WOULD BE BETTER OFF DEAD, OR OF HURTING YOURSELF: NOT AT ALL
SUM OF ALL RESPONSES TO PHQ QUESTIONS 1-9: 0
SUM OF ALL RESPONSES TO PHQ9 QUESTIONS 1 & 2: 0
1. LITTLE INTEREST OR PLEASURE IN DOING THINGS: NOT AT ALL
SUM OF ALL RESPONSES TO PHQ QUESTIONS 1-9: 0
6. FEELING BAD ABOUT YOURSELF - OR THAT YOU ARE A FAILURE OR HAVE LET YOURSELF OR YOUR FAMILY DOWN: NOT AT ALL
10. IF YOU CHECKED OFF ANY PROBLEMS, HOW DIFFICULT HAVE THESE PROBLEMS MADE IT FOR YOU TO DO YOUR WORK, TAKE CARE OF THINGS AT HOME, OR GET ALONG WITH OTHER PEOPLE: NOT DIFFICULT AT ALL
SUM OF ALL RESPONSES TO PHQ QUESTIONS 1-9: 0
8. MOVING OR SPEAKING SO SLOWLY THAT OTHER PEOPLE COULD HAVE NOTICED. OR THE OPPOSITE, BEING SO FIGETY OR RESTLESS THAT YOU HAVE BEEN MOVING AROUND A LOT MORE THAN USUAL: NOT AT ALL
3. TROUBLE FALLING OR STAYING ASLEEP: NOT AT ALL
7. TROUBLE CONCENTRATING ON THINGS, SUCH AS READING THE NEWSPAPER OR WATCHING TELEVISION: NOT AT ALL

## 2024-07-25 NOTE — PROGRESS NOTES
[facility]      Willis Beard MD, FACP, FACG, FAASLD      LUCIA Thomason-RODNEY Villeda, [facility]   Leilani Daybilly, [facility]   Loreto Alexys, [facility]  Tyrone Benavides, [facility]   Jocelyn Scott, [facility]   Fay Aston, [facility]   [address], [address]   [place]  [address] [phone]   FAX: [phone]  [facility]   [address], [address]   [place]  [address] [phone]   FAX: [phone]     Patient Care Team:  nOeil Magdaleno DO as PCP - General  Oneil Magdaleno DO as PCP - Empaneled Provider  Katherine Winter APRN - NP as Nurse Practitioner  Katherine Winter APRN - NP (General Surgery)    Patient Active Problem List   Diagnosis    Osteoarthritis of carpometacarpal (CMC) joint of thumb    Anxiety    Fibromyalgia    Depression    Status following gastric bypass for weight loss    Hypothyroidism due to acquired atrophy of thyroid    History of migraine headaches     The clinicians listed above have asked me to see Lina Mercedes in consultation regarding elevated liver enzymes and its management.  All medical records sent by the referring physicians were reviewed.    The patient is a 54 y.o. female who was found to have elevated liver enzymes in 2023. She was diagnosed with Mono around this time but the liver enzymes did not normalize.     Serologic evaluation for markers of chronic liver disease were negative.     Imaging of the liver was either not performed or the results are not available to me.      The patient had not started any new medications within 3 months preceding the elevation in liver chemistries. History of GBP with a total weight loss of 120 lbs. Of note, she was started on Wegovy 6 months ago when weight increased to 172 and is now down 13 lbs.

## 2024-07-25 NOTE — PROGRESS NOTES
Chief Complaint   Patient presents with    New Patient     Vitals:    07/25/24 1301   BP: 104/68   Site: Right Upper Arm   Position: Sitting   Pulse: 71   Temp: 97.3 °F (36.3 °C)   TempSrc: Temporal   SpO2: 95%   Weight: 67.9 kg (149 lb 9.6 oz)   Height: 1.575 m (5' 2\")     .  \"Have you been to the ER, urgent care clinic since your last visit?  Hospitalized since your last visit?\"    NO    “Have you seen or consulted any other health care providers outside of [facility] since your last visit?”    NO            Click Here for Release of Records Request

## 2024-07-26 LAB
ALBUMIN SERPL-MCNC: 3.8 G/DL (ref 3.5–5)
ALBUMIN/GLOB SERPL: 1.4 (ref 1.1–2.2)
ALP SERPL-CCNC: 132 U/L (ref 45–117)
ALT SERPL-CCNC: 48 U/L (ref 12–78)
ANION GAP SERPL CALC-SCNC: 4 MMOL/L (ref 5–15)
AST SERPL-CCNC: 24 U/L (ref 15–37)
BASOPHILS # BLD: 0 K/UL (ref 0–0.1)
BASOPHILS NFR BLD: 1 % (ref 0–1)
BILIRUB DIRECT SERPL-MCNC: 0.1 MG/DL (ref 0–0.2)
BILIRUB SERPL-MCNC: 0.4 MG/DL (ref 0.2–1)
BUN SERPL-MCNC: 16 MG/DL (ref 6–20)
BUN/CREAT SERPL: 24 (ref 12–20)
CALCIUM SERPL-MCNC: 9.5 MG/DL (ref 8.5–10.1)
CHLORIDE SERPL-SCNC: 106 MMOL/L (ref 97–108)
CO2 SERPL-SCNC: 30 MMOL/L (ref 21–32)
CREAT SERPL-MCNC: 0.66 MG/DL (ref 0.55–1.02)
DIFFERENTIAL METHOD BLD: NORMAL
EOSINOPHIL # BLD: 0.2 K/UL (ref 0–0.4)
EOSINOPHIL NFR BLD: 4 % (ref 0–7)
ERYTHROCYTE [DISTWIDTH] IN BLOOD BY AUTOMATED COUNT: 11.9 % (ref 11.5–14.5)
FERRITIN SERPL-MCNC: 100 NG/ML (ref 26–388)
GGT SERPL-CCNC: 159 U/L (ref 5–55)
GLOBULIN SER CALC-MCNC: 2.8 G/DL (ref 2–4)
GLUCOSE SERPL-MCNC: 80 MG/DL (ref 65–100)
HCT VFR BLD AUTO: 40.6 % (ref 35–47)
HGB BLD-MCNC: 14 G/DL (ref 11.5–16)
IMM GRANULOCYTES # BLD AUTO: 0 K/UL (ref 0–0.04)
IMM GRANULOCYTES NFR BLD AUTO: 0 % (ref 0–0.5)
IRON SATN MFR SERPL: 18 % (ref 20–50)
IRON SERPL-MCNC: 58 UG/DL (ref 35–150)
LYMPHOCYTES # BLD: 1.3 K/UL (ref 0.8–3.5)
LYMPHOCYTES NFR BLD: 28 % (ref 12–49)
MCH RBC QN AUTO: 31.6 PG (ref 26–34)
MCHC RBC AUTO-ENTMCNC: 34.5 G/DL (ref 30–36.5)
MCV RBC AUTO: 91.6 FL (ref 80–99)
MONOCYTES # BLD: 0.3 K/UL (ref 0–1)
MONOCYTES NFR BLD: 7 % (ref 5–13)
NEUTS SEG # BLD: 2.9 K/UL (ref 1.8–8)
NEUTS SEG NFR BLD: 60 % (ref 32–75)
NRBC # BLD: 0 K/UL (ref 0–0.01)
NRBC BLD-RTO: 0 PER 100 WBC
PLATELET # BLD AUTO: 184 K/UL (ref 150–400)
PMV BLD AUTO: 12.3 FL (ref 8.9–12.9)
POTASSIUM SERPL-SCNC: 4.4 MMOL/L (ref 3.5–5.1)
PROT SERPL-MCNC: 6.6 G/DL (ref 6.4–8.2)
RBC # BLD AUTO: 4.43 M/UL (ref 3.8–5.2)
SODIUM SERPL-SCNC: 140 MMOL/L (ref 136–145)
TIBC SERPL-MCNC: 323 UG/DL (ref 250–450)
WBC # BLD AUTO: 4.7 K/UL (ref 3.6–11)

## 2024-07-29 LAB
C-ANCA TITR SER IF: NORMAL TITER
P-ANCA ATYPICAL TITR SER IF: NORMAL TITER
P-ANCA TITR SER IF: NORMAL TITER

## 2024-11-20 ENCOUNTER — HOSPITAL ENCOUNTER (OUTPATIENT)
Facility: HOSPITAL | Age: 54
Discharge: HOME | End: 2024-11-23
Payer: COMMERCIAL

## 2024-11-20 DIAGNOSIS — R74.8 ELEVATED LIVER ENZYMES: ICD-10-CM

## 2024-11-20 PROCEDURE — 76700 US EXAM ABDOM COMPLETE: CPT

## 2024-11-25 ENCOUNTER — CLINICAL DOCUMENTATION (OUTPATIENT)
Age: 54
End: 2024-11-25

## 2024-11-25 DIAGNOSIS — R16.0 LIVER MASS: Primary | ICD-10-CM

## 2024-12-13 ENCOUNTER — HOSPITAL ENCOUNTER (OUTPATIENT)
Facility: HOSPITAL | Age: 54
Discharge: HOME | End: 2024-12-16
Payer: COMMERCIAL

## 2024-12-13 DIAGNOSIS — R16.0 LIVER MASS: ICD-10-CM

## 2024-12-13 PROCEDURE — 74170 CT ABD WO CNTRST FLWD CNTRST: CPT

## 2024-12-13 PROCEDURE — 6360000004 HC RX CONTRAST MEDICATION: Performed by: NURSE PRACTITIONER

## 2024-12-13 RX ORDER — IOPAMIDOL 755 MG/ML
100 INJECTION, SOLUTION INTRAVASCULAR
Status: COMPLETED | OUTPATIENT
Start: 2024-12-13 | End: 2024-12-13

## 2024-12-13 RX ADMIN — IOPAMIDOL 100 ML: 755 INJECTION, SOLUTION INTRAVENOUS at 07:47

## 2024-12-20 DIAGNOSIS — K76.9 LIVER LESION: Primary | ICD-10-CM

## 2025-01-23 ENCOUNTER — TELEMEDICINE (OUTPATIENT)
Age: 55
End: 2025-01-23
Payer: COMMERCIAL

## 2025-01-23 VITALS — WEIGHT: 134 LBS | BODY MASS INDEX: 24.66 KG/M2 | HEIGHT: 62 IN

## 2025-01-23 DIAGNOSIS — K91.2 POSTOPERATIVE INTESTINAL MALABSORPTION: Primary | ICD-10-CM

## 2025-01-23 DIAGNOSIS — K21.9 CHRONIC GERD: ICD-10-CM

## 2025-01-23 PROCEDURE — 99213 OFFICE O/P EST LOW 20 MIN: CPT | Performed by: NURSE PRACTITIONER

## 2025-01-23 ASSESSMENT — PATIENT HEALTH QUESTIONNAIRE - PHQ9
SUM OF ALL RESPONSES TO PHQ QUESTIONS 1-9: 0
1. LITTLE INTEREST OR PLEASURE IN DOING THINGS: NOT AT ALL
SUM OF ALL RESPONSES TO PHQ QUESTIONS 1-9: 0
2. FEELING DOWN, DEPRESSED OR HOPELESS: NOT AT ALL
SUM OF ALL RESPONSES TO PHQ9 QUESTIONS 1 & 2: 0
SUM OF ALL RESPONSES TO PHQ QUESTIONS 1-9: 0
SUM OF ALL RESPONSES TO PHQ QUESTIONS 1-9: 0

## 2025-01-23 ASSESSMENT — PAIN DESCRIPTION - LOCATION: LOCATION: HEAD

## 2025-01-23 ASSESSMENT — PAIN SCALES - GENERAL: PAINLEVEL_OUTOF10: 6

## 2025-01-23 NOTE — PROGRESS NOTES
Identified patient with two patient identifiers (name and ). Reviewed chart in preparation for visit and have obtained necessary documentation.    Lina Mercedes is a 54 y.o. female  Chief Complaint   Patient presents with    Follow-up     8.5 YEARS S/P  ROBOTIC GASTRIC BYPASS     Ht 1.575 m (5' 2\")   Wt 60.8 kg (134 lb)   BMI 24.51 kg/m²     1. Have you been to the ER, urgent care clinic since your last visit?  Hospitalized since your last visit?no    2. Have you seen or consulted any other health care providers outside of the [facility] since your last visit?  Include any pap smears or colon screening. no

## 2025-01-28 NOTE — PATIENT INSTRUCTIONS
Talk with your primary care provider about decreasing your dose to 1.7 mg of the Wegovy.  This will help minimize side effects and stabilize your weight.    If you would like to make an appointment with one of the bariatric dietitians, please call the bariatric line at [phone].  Appointments are offered in person and virtual at no charge.

## 2025-01-28 NOTE — PROGRESS NOTES
Chief Complaint   Patient presents with    Follow-up     8.5 YEARS S/P  ROBOTIC GASTRIC BYPASS       HPI: Lina Mercedes presents today for obesity management surgical follow-up care.  Other than chronic migraine she said she is doing well.  She is currently on Wegovy 2.4 mg with a good response.  Her last hemoglobin A1c was 4.7%.  Her thyroid is a bit off and she is getting follow-up with her primary care provider.  \"Otherwise him at baseline\".    8.5 years status post: [x] Gastric bypass for treatment of morbid obesity   [] Sleeve gastrectomy for treatment of morbid obesity   [] Conversion sleeve to gastric bypass   [] Conversion lap band to gastric bypass   [] SAD-I  [] DS  [] Revision      Presents today for [x] Obesity management   [] Weight regain   [] Abdominal pain   [] Medication management       Pain:  [x] Yes, Location migraine   [] No      Yes Meeting protein goals (60 grams minimum per day)   [x] Shakes [] Bars [x] Other decent food tolerance she has limits but she said she is at her baseline.  No worsening GI symptoms on Wegovy.  Yes Meeting hydration goals (64 oz minimum daily) [x] Water [] Juices [] Sugar-free add-ins [] Electrolyte drink/mix   No Tracking intake      Bowels moving  [x] Most days   [] Few times per week        Constipated   No    Using laxatives  No      Nausea   Yes she has antiemetics as needed  Regurgitation   Yes better with PPI and Pepcid twice daily    Vitamin compliance  Yes      [] Bariatric MVI 45 mg iron  [x] Other vitamin regimen take separate vitamin regimen and recent labs were good managed by her primary care provider  [] Calcium Citrate 1200 -1500 mg daily in divided doses   [] B12 injections 1000 mcg/ml   [] Prescription D2 50,000 iu  [] Weekly       [] Twice weekly       [] Three times weekly       [] Every 14 days       [] Monthly      Activity   Yes    The patient's reported level of exercise: The process of moving so she is been doing a lot of packing.    Skin

## 2025-02-17 DIAGNOSIS — R11.0 NAUSEA: ICD-10-CM

## 2025-02-17 RX ORDER — ONDANSETRON 4 MG/1
4 TABLET, ORALLY DISINTEGRATING ORAL EVERY 8 HOURS PRN
Qty: 30 TABLET | Refills: 1 | Status: SHIPPED | OUTPATIENT
Start: 2025-02-17

## 2025-03-17 ENCOUNTER — HOSPITAL ENCOUNTER (OUTPATIENT)
Facility: HOSPITAL | Age: 55
Discharge: HOME | End: 2025-03-20
Payer: COMMERCIAL

## 2025-03-17 VITALS — WEIGHT: 134 LBS | BODY MASS INDEX: 24.51 KG/M2

## 2025-03-17 DIAGNOSIS — K76.9 LIVER LESION: ICD-10-CM

## 2025-03-17 PROCEDURE — 74183 MRI ABD W/O CNTR FLWD CNTR: CPT

## 2025-03-17 PROCEDURE — A9579 GAD-BASE MR CONTRAST NOS,1ML: HCPCS | Performed by: RADIOLOGY

## 2025-03-17 PROCEDURE — 6360000004 HC RX CONTRAST MEDICATION: Performed by: RADIOLOGY

## 2025-03-17 RX ADMIN — GADOTERIDOL 12 ML: 279.3 INJECTION, SOLUTION INTRAVENOUS at 14:07

## 2025-03-21 ENCOUNTER — RESULTS FOLLOW-UP (OUTPATIENT)
Facility: HOSPITAL | Age: 55
End: 2025-03-21

## 2025-03-28 ENCOUNTER — TELEMEDICINE (OUTPATIENT)
Age: 55
End: 2025-03-28
Payer: COMMERCIAL

## 2025-03-28 DIAGNOSIS — R74.8 ELEVATED LIVER ENZYMES: Primary | ICD-10-CM

## 2025-03-28 PROCEDURE — 99213 OFFICE O/P EST LOW 20 MIN: CPT | Performed by: NURSE PRACTITIONER

## 2025-03-28 ASSESSMENT — ANXIETY QUESTIONNAIRES
3. WORRYING TOO MUCH ABOUT DIFFERENT THINGS: NOT AT ALL
2. NOT BEING ABLE TO STOP OR CONTROL WORRYING: NOT AT ALL
4. TROUBLE RELAXING: NOT AT ALL
6. BECOMING EASILY ANNOYED OR IRRITABLE: NOT AT ALL
1. FEELING NERVOUS, ANXIOUS, OR ON EDGE: NOT AT ALL
IF YOU CHECKED OFF ANY PROBLEMS ON THIS QUESTIONNAIRE, HOW DIFFICULT HAVE THESE PROBLEMS MADE IT FOR YOU TO DO YOUR WORK, TAKE CARE OF THINGS AT HOME, OR GET ALONG WITH OTHER PEOPLE: NOT DIFFICULT AT ALL
5. BEING SO RESTLESS THAT IT IS HARD TO SIT STILL: NOT AT ALL
GAD7 TOTAL SCORE: 0
7. FEELING AFRAID AS IF SOMETHING AWFUL MIGHT HAPPEN: NOT AT ALL

## 2025-03-28 ASSESSMENT — PATIENT HEALTH QUESTIONNAIRE - PHQ9
SUM OF ALL RESPONSES TO PHQ QUESTIONS 1-9: 0
2. FEELING DOWN, DEPRESSED OR HOPELESS: NOT AT ALL
5. POOR APPETITE OR OVEREATING: NOT AT ALL
6. FEELING BAD ABOUT YOURSELF - OR THAT YOU ARE A FAILURE OR HAVE LET YOURSELF OR YOUR FAMILY DOWN: NOT AT ALL
8. MOVING OR SPEAKING SO SLOWLY THAT OTHER PEOPLE COULD HAVE NOTICED. OR THE OPPOSITE, BEING SO FIGETY OR RESTLESS THAT YOU HAVE BEEN MOVING AROUND A LOT MORE THAN USUAL: NOT AT ALL
9. THOUGHTS THAT YOU WOULD BE BETTER OFF DEAD, OR OF HURTING YOURSELF: NOT AT ALL
DEPRESSION UNABLE TO ASSESS: FUNCTIONAL CAPACITY MOTIVATION LIMITS ACCURACY
10. IF YOU CHECKED OFF ANY PROBLEMS, HOW DIFFICULT HAVE THESE PROBLEMS MADE IT FOR YOU TO DO YOUR WORK, TAKE CARE OF THINGS AT HOME, OR GET ALONG WITH OTHER PEOPLE: NOT DIFFICULT AT ALL
3. TROUBLE FALLING OR STAYING ASLEEP: NOT AT ALL
SUM OF ALL RESPONSES TO PHQ QUESTIONS 1-9: 0
1. LITTLE INTEREST OR PLEASURE IN DOING THINGS: NOT AT ALL
SUM OF ALL RESPONSES TO PHQ QUESTIONS 1-9: 0
SUM OF ALL RESPONSES TO PHQ QUESTIONS 1-9: 0
7. TROUBLE CONCENTRATING ON THINGS, SUCH AS READING THE NEWSPAPER OR WATCHING TELEVISION: NOT AT ALL
4. FEELING TIRED OR HAVING LITTLE ENERGY: NOT AT ALL

## 2025-03-28 NOTE — PROGRESS NOTES
[facility]      Willis Beard MD, FACP, FACG, FAASLD      JUAN ThomasonC    Breana Villeda, [facility]   Leilani Dayarielmonserrat, [facility]   Loreto Reardon, [facility]  Tyrone Benavides, [facility]-   Jocelyn Scott, [facility]   Fay Rico, [facility]   [address], [address]   [place]  [address] [phone]   FAX: [phone]  [facility]   [address], [address]   [place]  [address] [phone]   FAX: [phone]     Patient Care Team:  Oneil Magdaleno DO as PCP - General  Oneil Magdaleno DO as PCP - Empaneled Provider  Katherine Winter APRN - NP as Nurse Practitioner  Katherine Winter APRN - NP (General Surgery)    Patient Active Problem List   Diagnosis    Osteoarthritis of carpometacarpal (CMC) joint of thumb    Anxiety    Fibromyalgia    Depression    Status following gastric bypass for weight loss    Hypothyroidism due to acquired atrophy of thyroid    History of migraine headaches     Lina Mercedes, was evaluated through a synchronous (real-time) audio-video encounter. The patient (or guardian if applicable) is aware that this is a billable service, which includes applicable co-pays. This Virtual Visit was conducted with patient's (and/or legal guardian's) consent. Patient identification was verified, and a caregiver was present when appropriate.   The patient was located at Home: [id number] [place] [id number]  Provider was located at Facility (Appt Dept): [address] [facility]  [place] [address] [place] [id number]  Confirm you are appropriately licensed, registered, or certified to deliver care in the state where the patient is located as indicated above. If you are not or unsure, please re-schedule the visit: Yes, I confirm.      --TOÑITO Melendez NP on 3/28/2025 at 9:50

## (undated) DEVICE — DEVON™ KNEE AND BODY STRAP 60" X 3" (1.5 M X 7.6 CM): Brand: DEVON

## (undated) DEVICE — (D)PREP SKN CHLRAPRP APPL 26ML -- CONVERT TO ITEM 371833

## (undated) DEVICE — DRAPE XR C ARM 41X74IN LF --

## (undated) DEVICE — SUTURE PERMAHAND SZ 2-0 L30IN NONABSORBABLE BLK SH L26MM C016D

## (undated) DEVICE — VISUALIZATION SYSTEM: Brand: CLEARIFY

## (undated) DEVICE — BLADELESS OPTICAL TROCAR WITH FIXATION CANNULA: Brand: VERSAPORT

## (undated) DEVICE — SUTURE MCRYL SZ 4-0 L27IN ABSRB UD L19MM PS-2 1/2 CIR PRIM Y426H

## (undated) DEVICE — DRAPE,REIN 53X77,STERILE: Brand: MEDLINE

## (undated) DEVICE — TUBING INSUF HEAT STRL 10 FT --

## (undated) DEVICE — DERMABOND SKIN ADH 0.7ML -- DERMABOND ADVANCED 12/BX

## (undated) DEVICE — SURGICAL PROCEDURE KIT GEN LAPAROSCOPY LF

## (undated) DEVICE — TUBING INSUFLTN 10FT LUER -- CONVERT TO ITEM 368568

## (undated) DEVICE — TROCAR SITE CLOSURE DEVICE: Brand: ENDO CLOSE

## (undated) DEVICE — Device

## (undated) DEVICE — SUTURE SZ 0 27IN 5/8 CIR UR-6  TAPER PT VIOLET ABSRB VICRYL J603H

## (undated) DEVICE — CLICKLINE SCISSORS INSERT: Brand: CLICKLINE

## (undated) DEVICE — NEEDLE HYPO 22GA L1.5IN BLK S STL HUB POLYPR SHLD REG BVL

## (undated) DEVICE — ELECTRODE ES 36CM LAP FLAT L HK COAT DISP CLEANCOAT

## (undated) DEVICE — INFECTION CONTROL KIT SYS

## (undated) DEVICE — 3000CC GUARDIAN II: Brand: GUARDIAN

## (undated) DEVICE — SEAL

## (undated) DEVICE — KENDALL SCD EXPRESS SLEEVES, KNEE LENGTH, MEDIUM: Brand: KENDALL SCD

## (undated) DEVICE — STAPLER INT DIA5MM 25 ABSRB STRP FIX DISP FOR HERN MESH

## (undated) DEVICE — STERILE POLYISOPRENE POWDER-FREE SURGICAL GLOVES: Brand: PROTEXIS

## (undated) DEVICE — REM POLYHESIVE ADULT PATIENT RETURN ELECTRODE: Brand: VALLEYLAB

## (undated) DEVICE — SOLUTION IV 1000ML 0.9% SOD CHL

## (undated) DEVICE — SUTURE V-LOC 180 SZ 2-0 L12IN ABSRB GRN V-20 L26MM 1/2 CIR VLOCL0615

## (undated) DEVICE — BASIC PACK: Brand: CONVERTORS

## (undated) DEVICE — CLIP APPLIER WITH CLIP LOGIC TECHNOLOGY: Brand: ENDO CLIP III

## (undated) DEVICE — E-Z CLEAN, PTFE COATED, ELECTROSURGICAL LAPAROSCOPIC ELECTRODE, L-HOOK, 33 CM., SINGLE-USE, FOR USE WITH HAND CONTROL PENCIL: Brand: MEGADYNE

## (undated) DEVICE — BLADELESS OPTICAL TROCAR WITH FIXATION CANNULA: Brand: VERSAONE

## (undated) DEVICE — SPECIMEN RETRIEVAL POUCH: Brand: ENDO CATCH GOLD

## (undated) DEVICE — REDUCER CANN ENDOWRIST 12-8MM -- DA VINCI XI - SNGL USE

## (undated) DEVICE — TRAY CATH 16F DRN BG LTX -- CONVERT TO ITEM 363158